# Patient Record
Sex: FEMALE | Race: BLACK OR AFRICAN AMERICAN | NOT HISPANIC OR LATINO | Employment: OTHER | URBAN - METROPOLITAN AREA
[De-identification: names, ages, dates, MRNs, and addresses within clinical notes are randomized per-mention and may not be internally consistent; named-entity substitution may affect disease eponyms.]

---

## 2023-01-07 ENCOUNTER — HOSPITAL ENCOUNTER (INPATIENT)
Facility: HOSPITAL | Age: 63
LOS: 4 days | Discharge: HOME | DRG: 177 | End: 2023-01-11
Attending: EMERGENCY MEDICINE | Admitting: INTERNAL MEDICINE
Payer: MEDICARE

## 2023-01-07 DIAGNOSIS — R06.00 DYSPNEA: ICD-10-CM

## 2023-01-07 DIAGNOSIS — U07.1 COVID-19: ICD-10-CM

## 2023-01-07 DIAGNOSIS — U07.1 PNEUMONIA DUE TO COVID-19 VIRUS: ICD-10-CM

## 2023-01-07 DIAGNOSIS — R07.9 CHEST PAIN: ICD-10-CM

## 2023-01-07 DIAGNOSIS — J12.82 PNEUMONIA DUE TO COVID-19 VIRUS: ICD-10-CM

## 2023-01-07 DIAGNOSIS — R09.02 HYPOXIA: Primary | ICD-10-CM

## 2023-01-07 LAB
ALBUMIN SERPL BCP-MCNC: 3.1 G/DL (ref 3.5–5.2)
ALP SERPL-CCNC: 43 U/L (ref 55–135)
ALT SERPL W/O P-5'-P-CCNC: 13 U/L (ref 10–44)
ANION GAP SERPL CALC-SCNC: 13 MMOL/L (ref 8–16)
AST SERPL-CCNC: 20 U/L (ref 10–40)
BASOPHILS # BLD AUTO: 0.01 K/UL (ref 0–0.2)
BASOPHILS NFR BLD: 0.2 % (ref 0–1.9)
BILIRUB SERPL-MCNC: 0.4 MG/DL (ref 0.1–1)
BNP SERPL-MCNC: 46 PG/ML (ref 0–99)
BUN SERPL-MCNC: 18 MG/DL (ref 8–23)
CALCIUM SERPL-MCNC: 8.1 MG/DL (ref 8.7–10.5)
CHLORIDE SERPL-SCNC: 93 MMOL/L (ref 95–110)
CO2 SERPL-SCNC: 32 MMOL/L (ref 23–29)
CREAT SERPL-MCNC: 6.4 MG/DL (ref 0.5–1.4)
DIFFERENTIAL METHOD: ABNORMAL
EOSINOPHIL # BLD AUTO: 0 K/UL (ref 0–0.5)
EOSINOPHIL NFR BLD: 0.4 % (ref 0–8)
ERYTHROCYTE [DISTWIDTH] IN BLOOD BY AUTOMATED COUNT: 15 % (ref 11.5–14.5)
EST. GFR  (NO RACE VARIABLE): 6.8 ML/MIN/1.73 M^2
GLUCOSE SERPL-MCNC: 83 MG/DL (ref 70–110)
HCT VFR BLD AUTO: 32.9 % (ref 37–48.5)
HGB BLD-MCNC: 10.5 G/DL (ref 12–16)
IMM GRANULOCYTES # BLD AUTO: 0.03 K/UL (ref 0–0.04)
IMM GRANULOCYTES NFR BLD AUTO: 0.7 % (ref 0–0.5)
LACTATE SERPL-SCNC: 1 MMOL/L (ref 0.5–1.9)
LYMPHOCYTES # BLD AUTO: 1 K/UL (ref 1–4.8)
LYMPHOCYTES NFR BLD: 22 % (ref 18–48)
MAGNESIUM SERPL-MCNC: 1.5 MG/DL (ref 1.6–2.6)
MCH RBC QN AUTO: 28.9 PG (ref 27–31)
MCHC RBC AUTO-ENTMCNC: 31.9 G/DL (ref 32–36)
MCV RBC AUTO: 91 FL (ref 82–98)
MONOCYTES # BLD AUTO: 0.3 K/UL (ref 0.3–1)
MONOCYTES NFR BLD: 7.6 % (ref 4–15)
NEUTROPHILS # BLD AUTO: 3.1 K/UL (ref 1.8–7.7)
NEUTROPHILS NFR BLD: 69.1 % (ref 38–73)
NRBC BLD-RTO: 0 /100 WBC
PLATELET # BLD AUTO: 172 K/UL (ref 150–450)
PMV BLD AUTO: 11.8 FL (ref 9.2–12.9)
POTASSIUM SERPL-SCNC: 3.3 MMOL/L (ref 3.5–5.1)
PROCALCITONIN SERPL IA-MCNC: 0.71 NG/ML (ref 0–0.5)
PROT SERPL-MCNC: 7.1 G/DL (ref 6–8.4)
RBC # BLD AUTO: 3.63 M/UL (ref 4–5.4)
SODIUM SERPL-SCNC: 138 MMOL/L (ref 136–145)
TROPONIN I SERPL HS-MCNC: 26.2 PG/ML (ref 0–14.9)
TROPONIN I SERPL HS-MCNC: 26.6 PG/ML (ref 0–14.9)
TROPONIN I SERPL HS-MCNC: 26.9 PG/ML (ref 0–14.9)
WBC # BLD AUTO: 4.49 K/UL (ref 3.9–12.7)

## 2023-01-07 PROCEDURE — 83880 ASSAY OF NATRIURETIC PEPTIDE: CPT | Performed by: NURSE PRACTITIONER

## 2023-01-07 PROCEDURE — 63600175 PHARM REV CODE 636 W HCPCS: Performed by: NURSE PRACTITIONER

## 2023-01-07 PROCEDURE — 87040 BLOOD CULTURE FOR BACTERIA: CPT | Performed by: NURSE PRACTITIONER

## 2023-01-07 PROCEDURE — 84145 PROCALCITONIN (PCT): CPT | Performed by: EMERGENCY MEDICINE

## 2023-01-07 PROCEDURE — 80053 COMPREHEN METABOLIC PANEL: CPT | Performed by: NURSE PRACTITIONER

## 2023-01-07 PROCEDURE — 36415 COLL VENOUS BLD VENIPUNCTURE: CPT | Performed by: NURSE PRACTITIONER

## 2023-01-07 PROCEDURE — 93005 ELECTROCARDIOGRAM TRACING: CPT | Performed by: INTERNAL MEDICINE

## 2023-01-07 PROCEDURE — 83735 ASSAY OF MAGNESIUM: CPT | Performed by: NURSE PRACTITIONER

## 2023-01-07 PROCEDURE — 84484 ASSAY OF TROPONIN QUANT: CPT | Mod: 91 | Performed by: NURSE PRACTITIONER

## 2023-01-07 PROCEDURE — 93010 EKG 12-LEAD: ICD-10-PCS | Mod: ,,, | Performed by: INTERNAL MEDICINE

## 2023-01-07 PROCEDURE — 12000002 HC ACUTE/MED SURGE SEMI-PRIVATE ROOM

## 2023-01-07 PROCEDURE — 25500020 PHARM REV CODE 255: Performed by: EMERGENCY MEDICINE

## 2023-01-07 PROCEDURE — 83605 ASSAY OF LACTIC ACID: CPT | Performed by: NURSE PRACTITIONER

## 2023-01-07 PROCEDURE — 96374 THER/PROPH/DIAG INJ IV PUSH: CPT

## 2023-01-07 PROCEDURE — 93010 ELECTROCARDIOGRAM REPORT: CPT | Mod: ,,, | Performed by: INTERNAL MEDICINE

## 2023-01-07 PROCEDURE — 25000003 PHARM REV CODE 250: Performed by: NURSE PRACTITIONER

## 2023-01-07 PROCEDURE — 99285 EMERGENCY DEPT VISIT HI MDM: CPT | Mod: 25

## 2023-01-07 PROCEDURE — 85025 COMPLETE CBC W/AUTO DIFF WBC: CPT | Performed by: NURSE PRACTITIONER

## 2023-01-07 RX ORDER — CETIRIZINE HYDROCHLORIDE 10 MG/1
10 TABLET ORAL DAILY
Status: ON HOLD | COMMUNITY
End: 2023-10-12

## 2023-01-07 RX ORDER — CARVEDILOL 12.5 MG/1
12.5 TABLET ORAL 2 TIMES DAILY WITH MEALS
COMMUNITY
End: 2024-03-05 | Stop reason: SDUPTHER

## 2023-01-07 RX ORDER — CALCITRIOL 0.25 UG/1
0.25 CAPSULE ORAL DAILY
COMMUNITY

## 2023-01-07 RX ORDER — ONDANSETRON 2 MG/ML
4 INJECTION INTRAMUSCULAR; INTRAVENOUS
Status: COMPLETED | OUTPATIENT
Start: 2023-01-07 | End: 2023-01-07

## 2023-01-07 RX ORDER — AMLODIPINE BESYLATE 5 MG/1
5 TABLET ORAL
Status: COMPLETED | OUTPATIENT
Start: 2023-01-07 | End: 2023-01-07

## 2023-01-07 RX ORDER — ROSUVASTATIN CALCIUM 10 MG/1
10 TABLET, COATED ORAL DAILY
COMMUNITY
End: 2023-07-18 | Stop reason: SDUPTHER

## 2023-01-07 RX ORDER — POTASSIUM CHLORIDE 20 MEQ/1
20 TABLET, EXTENDED RELEASE ORAL ONCE
Status: COMPLETED | OUTPATIENT
Start: 2023-01-07 | End: 2023-01-07

## 2023-01-07 RX ORDER — CARVEDILOL 12.5 MG/1
12.5 TABLET ORAL
Status: COMPLETED | OUTPATIENT
Start: 2023-01-07 | End: 2023-01-07

## 2023-01-07 RX ORDER — SAXAGLIPTIN 2.5 MG/1
2.5 TABLET, FILM COATED ORAL DAILY
COMMUNITY
End: 2023-06-22

## 2023-01-07 RX ORDER — AMLODIPINE BESYLATE 5 MG/1
5 TABLET ORAL DAILY
COMMUNITY
End: 2024-03-05 | Stop reason: SDUPTHER

## 2023-01-07 RX ORDER — OMEPRAZOLE 40 MG/1
40 CAPSULE, DELAYED RELEASE ORAL DAILY
COMMUNITY
End: 2023-08-10 | Stop reason: SDUPTHER

## 2023-01-07 RX ORDER — ASPIRIN 81 MG/1
81 TABLET ORAL DAILY
COMMUNITY

## 2023-01-07 RX ADMIN — AMLODIPINE BESYLATE 5 MG: 5 TABLET ORAL at 09:01

## 2023-01-07 RX ADMIN — POTASSIUM CHLORIDE 20 MEQ: 1500 TABLET, EXTENDED RELEASE ORAL at 09:01

## 2023-01-07 RX ADMIN — IOHEXOL 100 ML: 350 INJECTION, SOLUTION INTRAVENOUS at 07:01

## 2023-01-07 RX ADMIN — ONDANSETRON 4 MG: 2 INJECTION INTRAMUSCULAR; INTRAVENOUS at 05:01

## 2023-01-07 RX ADMIN — CARVEDILOL 12.5 MG: 12.5 TABLET, FILM COATED ORAL at 09:01

## 2023-01-08 ENCOUNTER — CLINICAL SUPPORT (OUTPATIENT)
Dept: CARDIOLOGY | Facility: HOSPITAL | Age: 63
DRG: 177 | End: 2023-01-08
Attending: INTERNAL MEDICINE
Payer: MEDICARE

## 2023-01-08 VITALS — WEIGHT: 293 LBS | BODY MASS INDEX: 47.09 KG/M2 | HEIGHT: 66 IN

## 2023-01-08 PROBLEM — U07.1 PNEUMONIA DUE TO COVID-19 VIRUS: Status: ACTIVE | Noted: 2023-01-08

## 2023-01-08 PROBLEM — N18.6 ESRD (END STAGE RENAL DISEASE): Status: ACTIVE | Noted: 2023-01-08

## 2023-01-08 PROBLEM — J12.82 PNEUMONIA DUE TO COVID-19 VIRUS: Status: ACTIVE | Noted: 2023-01-08

## 2023-01-08 PROBLEM — E11.9 TYPE 2 DIABETES MELLITUS WITHOUT COMPLICATION, WITHOUT LONG-TERM CURRENT USE OF INSULIN: Status: ACTIVE | Noted: 2023-01-08

## 2023-01-08 PROBLEM — I10 PRIMARY HYPERTENSION: Status: ACTIVE | Noted: 2023-01-08

## 2023-01-08 LAB
25(OH)D3+25(OH)D2 SERPL-MCNC: 40 NG/ML (ref 30–96)
ALBUMIN SERPL BCP-MCNC: 3.1 G/DL (ref 3.5–5.2)
ALBUMIN SERPL BCP-MCNC: 3.1 G/DL (ref 3.5–5.2)
ALP SERPL-CCNC: 41 U/L (ref 55–135)
ALT SERPL W/O P-5'-P-CCNC: 11 U/L (ref 10–44)
ANION GAP SERPL CALC-SCNC: 13 MMOL/L (ref 8–16)
ANION GAP SERPL CALC-SCNC: 13 MMOL/L (ref 8–16)
AORTIC ROOT ANNULUS: 2.7 CM
AORTIC VALVE CUSP SEPERATION: 1.87 CM
APTT BLDCRRT: 28.5 SEC (ref 21–32)
AST SERPL-CCNC: 18 U/L (ref 10–40)
AV INDEX (PROSTH): 0.75
AV MEAN GRADIENT: 5 MMHG
AV PEAK GRADIENT: 9 MMHG
AV VALVE AREA: 2.37 CM2
AV VELOCITY RATIO: 0.67
BILIRUB SERPL-MCNC: 0.6 MG/DL (ref 0.1–1)
BSA FOR ECHO PROCEDURE: 2.51 M2
BUN SERPL-MCNC: 21 MG/DL (ref 8–23)
BUN SERPL-MCNC: 21 MG/DL (ref 8–23)
CALCIUM SERPL-MCNC: 7.6 MG/DL (ref 8.7–10.5)
CALCIUM SERPL-MCNC: 7.6 MG/DL (ref 8.7–10.5)
CHLORIDE SERPL-SCNC: 95 MMOL/L (ref 95–110)
CHLORIDE SERPL-SCNC: 95 MMOL/L (ref 95–110)
CK SERPL-CCNC: 209 U/L (ref 20–180)
CO2 SERPL-SCNC: 30 MMOL/L (ref 23–29)
CO2 SERPL-SCNC: 30 MMOL/L (ref 23–29)
CREAT SERPL-MCNC: 7.3 MG/DL (ref 0.5–1.4)
CREAT SERPL-MCNC: 7.3 MG/DL (ref 0.5–1.4)
CRP SERPL-MCNC: 3.8 MG/DL
CV ECHO LV RWT: 0.72 CM
D DIMER PPP IA.FEU-MCNC: 4.05 MG/L FEU
DOP CALC AO PEAK VEL: 1.48 M/S
DOP CALC AO VTI: 29.3 CM
DOP CALC LVOT AREA: 3.2 CM2
DOP CALC LVOT DIAMETER: 2.01 CM
DOP CALC LVOT PEAK VEL: 0.99 M/S
DOP CALC LVOT STROKE VOLUME: 69.46 CM3
DOP CALCLVOT PEAK VEL VTI: 21.9 CM
E WAVE DECELERATION TIME: 207.19 MSEC
E/A RATIO: 0.94
E/E' RATIO: 9.71 M/S
ECHO LV POSTERIOR WALL: 1.53 CM (ref 0.6–1.1)
EJECTION FRACTION: 65 %
ERYTHROCYTE [SEDIMENTATION RATE] IN BLOOD BY WESTERGREN METHOD: 58 MM/HR (ref 0–20)
EST. GFR  (NO RACE VARIABLE): 5.8 ML/MIN/1.73 M^2
EST. GFR  (NO RACE VARIABLE): 5.8 ML/MIN/1.73 M^2
FERRITIN SERPL-MCNC: 1300 NG/ML (ref 20–300)
FRACTIONAL SHORTENING: 31 % (ref 28–44)
GLUCOSE SERPL-MCNC: 122 MG/DL (ref 70–110)
GLUCOSE SERPL-MCNC: 128 MG/DL (ref 70–110)
GLUCOSE SERPL-MCNC: 128 MG/DL (ref 70–110)
GLUCOSE SERPL-MCNC: 142 MG/DL (ref 70–110)
GLUCOSE SERPL-MCNC: 189 MG/DL (ref 70–110)
INR PPP: 1 (ref 0.8–1.2)
INTERVENTRICULAR SEPTUM: 1.54 CM (ref 0.6–1.1)
IVRT: 70.45 MSEC
LDH SERPL L TO P-CCNC: 173 U/L (ref 110–260)
LEFT ATRIUM SIZE: 3.78 CM
LEFT ATRIUM VOLUME INDEX MOD: 22.8 ML/M2
LEFT ATRIUM VOLUME MOD: 54 CM3
LEFT INTERNAL DIMENSION IN SYSTOLE: 2.96 CM (ref 2.1–4)
LEFT VENTRICLE DIASTOLIC VOLUME INDEX: 34.32 ML/M2
LEFT VENTRICLE DIASTOLIC VOLUME: 81.35 ML
LEFT VENTRICLE MASS INDEX: 111 G/M2
LEFT VENTRICLE SYSTOLIC VOLUME INDEX: 14.3 ML/M2
LEFT VENTRICLE SYSTOLIC VOLUME: 33.96 ML
LEFT VENTRICULAR INTERNAL DIMENSION IN DIASTOLE: 4.26 CM (ref 3.5–6)
LEFT VENTRICULAR MASS: 263.95 G
LV LATERAL E/E' RATIO: 11.33 M/S
LV SEPTAL E/E' RATIO: 8.5 M/S
LVOT MG: 1.87 MMHG
LVOT MV: 0.62 CM/S
MAGNESIUM SERPL-MCNC: 1.5 MG/DL (ref 1.6–2.6)
MV PEAK A VEL: 1.08 M/S
MV PEAK E VEL: 1.02 M/S
MV PEAK GRADIENT: 5 MMHG
MV STENOSIS PRESSURE HALF TIME: 60.09 MS
MV VALVE AREA P 1/2 METHOD: 3.66 CM2
PHOSPHATE SERPL-MCNC: 3.6 MG/DL (ref 2.7–4.5)
PHOSPHATE SERPL-MCNC: 3.6 MG/DL (ref 2.7–4.5)
POTASSIUM SERPL-SCNC: 3.5 MMOL/L (ref 3.5–5.1)
POTASSIUM SERPL-SCNC: 3.5 MMOL/L (ref 3.5–5.1)
PROT SERPL-MCNC: 6.5 G/DL (ref 6–8.4)
PROTHROMBIN TIME: 10.8 SEC (ref 9–12.5)
PV MV: 0.77 M/S
PV PEAK VELOCITY: 1.3 CM/S
RA PRESSURE: 3 MMHG
RV TISSUE DOPPLER FREE WALL SYSTOLIC VELOCITY 1 (APICAL 4 CHAMBER VIEW): 0.01 CM/S
SODIUM SERPL-SCNC: 138 MMOL/L (ref 136–145)
SODIUM SERPL-SCNC: 138 MMOL/L (ref 136–145)
TDI LATERAL: 0.09 M/S
TDI SEPTAL: 0.12 M/S
TDI: 0.11 M/S
TRICUSPID ANNULAR PLANE SYSTOLIC EXCURSION: 1.92 CM
TROPONIN I SERPL HS-MCNC: 29.2 PG/ML (ref 0–14.9)

## 2023-01-08 PROCEDURE — 83735 ASSAY OF MAGNESIUM: CPT | Performed by: INTERNAL MEDICINE

## 2023-01-08 PROCEDURE — 83615 LACTATE (LD) (LDH) ENZYME: CPT | Performed by: INTERNAL MEDICINE

## 2023-01-08 PROCEDURE — 82728 ASSAY OF FERRITIN: CPT | Performed by: INTERNAL MEDICINE

## 2023-01-08 PROCEDURE — 93306 TTE W/DOPPLER COMPLETE: CPT

## 2023-01-08 PROCEDURE — 80053 COMPREHEN METABOLIC PANEL: CPT | Performed by: INTERNAL MEDICINE

## 2023-01-08 PROCEDURE — 85730 THROMBOPLASTIN TIME PARTIAL: CPT | Performed by: INTERNAL MEDICINE

## 2023-01-08 PROCEDURE — 25000003 PHARM REV CODE 250: Performed by: INTERNAL MEDICINE

## 2023-01-08 PROCEDURE — 94640 AIRWAY INHALATION TREATMENT: CPT

## 2023-01-08 PROCEDURE — 99900035 HC TECH TIME PER 15 MIN (STAT)

## 2023-01-08 PROCEDURE — 85379 FIBRIN DEGRADATION QUANT: CPT | Performed by: INTERNAL MEDICINE

## 2023-01-08 PROCEDURE — 82306 VITAMIN D 25 HYDROXY: CPT | Performed by: INTERNAL MEDICINE

## 2023-01-08 PROCEDURE — 94799 UNLISTED PULMONARY SVC/PX: CPT

## 2023-01-08 PROCEDURE — 86140 C-REACTIVE PROTEIN: CPT | Performed by: INTERNAL MEDICINE

## 2023-01-08 PROCEDURE — 93306 TTE W/DOPPLER COMPLETE: CPT | Mod: 26,,, | Performed by: GENERAL PRACTICE

## 2023-01-08 PROCEDURE — 36415 COLL VENOUS BLD VENIPUNCTURE: CPT | Performed by: INTERNAL MEDICINE

## 2023-01-08 PROCEDURE — 12000002 HC ACUTE/MED SURGE SEMI-PRIVATE ROOM

## 2023-01-08 PROCEDURE — 85610 PROTHROMBIN TIME: CPT | Performed by: INTERNAL MEDICINE

## 2023-01-08 PROCEDURE — 84484 ASSAY OF TROPONIN QUANT: CPT | Performed by: INTERNAL MEDICINE

## 2023-01-08 PROCEDURE — 93306 ECHO (CUPID ONLY): ICD-10-PCS | Mod: 26,,, | Performed by: GENERAL PRACTICE

## 2023-01-08 PROCEDURE — 85651 RBC SED RATE NONAUTOMATED: CPT | Performed by: INTERNAL MEDICINE

## 2023-01-08 PROCEDURE — 99900031 HC PATIENT EDUCATION (STAT)

## 2023-01-08 PROCEDURE — 94761 N-INVAS EAR/PLS OXIMETRY MLT: CPT

## 2023-01-08 PROCEDURE — 27000221 HC OXYGEN, UP TO 24 HOURS

## 2023-01-08 PROCEDURE — 84100 ASSAY OF PHOSPHORUS: CPT | Performed by: INTERNAL MEDICINE

## 2023-01-08 PROCEDURE — 63600175 PHARM REV CODE 636 W HCPCS: Mod: TB | Performed by: INTERNAL MEDICINE

## 2023-01-08 PROCEDURE — 25000242 PHARM REV CODE 250 ALT 637 W/ HCPCS: Performed by: INTERNAL MEDICINE

## 2023-01-08 PROCEDURE — 82550 ASSAY OF CK (CPK): CPT | Performed by: INTERNAL MEDICINE

## 2023-01-08 RX ORDER — TALC
6 POWDER (GRAM) TOPICAL NIGHTLY PRN
Status: DISCONTINUED | OUTPATIENT
Start: 2023-01-08 | End: 2023-01-11 | Stop reason: HOSPADM

## 2023-01-08 RX ORDER — SODIUM CHLORIDE 9 MG/ML
INJECTION, SOLUTION INTRAVENOUS
Status: CANCELLED | OUTPATIENT
Start: 2023-01-08

## 2023-01-08 RX ORDER — IBUPROFEN 200 MG
24 TABLET ORAL
Status: DISCONTINUED | OUTPATIENT
Start: 2023-01-08 | End: 2023-01-11 | Stop reason: HOSPADM

## 2023-01-08 RX ORDER — ENOXAPARIN SODIUM 100 MG/ML
100 INJECTION SUBCUTANEOUS EVERY 24 HOURS
Status: DISCONTINUED | OUTPATIENT
Start: 2023-01-09 | End: 2023-01-11 | Stop reason: HOSPADM

## 2023-01-08 RX ORDER — HYDROCODONE BITARTRATE AND ACETAMINOPHEN 5; 325 MG/1; MG/1
1 TABLET ORAL EVERY 4 HOURS PRN
Status: DISCONTINUED | OUTPATIENT
Start: 2023-01-08 | End: 2023-01-11 | Stop reason: HOSPADM

## 2023-01-08 RX ORDER — ENOXAPARIN SODIUM 100 MG/ML
40 INJECTION SUBCUTANEOUS
Status: DISCONTINUED | OUTPATIENT
Start: 2023-01-09 | End: 2023-01-11 | Stop reason: HOSPADM

## 2023-01-08 RX ORDER — ACETAMINOPHEN 325 MG/1
650 TABLET ORAL EVERY 8 HOURS PRN
Status: DISCONTINUED | OUTPATIENT
Start: 2023-01-08 | End: 2023-01-11 | Stop reason: HOSPADM

## 2023-01-08 RX ORDER — LANOLIN ALCOHOL/MO/W.PET/CERES
400 CREAM (GRAM) TOPICAL 2 TIMES DAILY
Status: DISPENSED | OUTPATIENT
Start: 2023-01-08 | End: 2023-01-10

## 2023-01-08 RX ORDER — MUPIROCIN 20 MG/G
OINTMENT TOPICAL 2 TIMES DAILY
Status: DISCONTINUED | OUTPATIENT
Start: 2023-01-08 | End: 2023-01-11 | Stop reason: HOSPADM

## 2023-01-08 RX ORDER — ASCORBIC ACID 500 MG
500 TABLET ORAL 2 TIMES DAILY
Status: DISCONTINUED | OUTPATIENT
Start: 2023-01-08 | End: 2023-01-11 | Stop reason: HOSPADM

## 2023-01-08 RX ORDER — ALBUTEROL SULFATE 90 UG/1
2 AEROSOL, METERED RESPIRATORY (INHALATION) EVERY 8 HOURS
Status: DISCONTINUED | OUTPATIENT
Start: 2023-01-08 | End: 2023-01-08

## 2023-01-08 RX ORDER — ENOXAPARIN SODIUM 100 MG/ML
1 INJECTION SUBCUTANEOUS
Status: DISCONTINUED | OUTPATIENT
Start: 2023-01-08 | End: 2023-01-08

## 2023-01-08 RX ORDER — ASPIRIN 81 MG/1
81 TABLET ORAL DAILY
Status: DISCONTINUED | OUTPATIENT
Start: 2023-01-08 | End: 2023-01-11 | Stop reason: HOSPADM

## 2023-01-08 RX ORDER — ONDANSETRON 2 MG/ML
4 INJECTION INTRAMUSCULAR; INTRAVENOUS EVERY 8 HOURS PRN
Status: DISCONTINUED | OUTPATIENT
Start: 2023-01-08 | End: 2023-01-11 | Stop reason: HOSPADM

## 2023-01-08 RX ORDER — HEPARIN SODIUM 5000 [USP'U]/ML
5000 INJECTION, SOLUTION INTRAVENOUS; SUBCUTANEOUS
Status: CANCELLED | OUTPATIENT
Start: 2023-01-08

## 2023-01-08 RX ORDER — GUAIFENESIN/DEXTROMETHORPHAN 100-10MG/5
10 SYRUP ORAL EVERY 4 HOURS PRN
Status: DISCONTINUED | OUTPATIENT
Start: 2023-01-08 | End: 2023-01-11 | Stop reason: HOSPADM

## 2023-01-08 RX ORDER — CALCITRIOL 0.25 UG/1
0.25 CAPSULE ORAL DAILY
Status: DISCONTINUED | OUTPATIENT
Start: 2023-01-08 | End: 2023-01-11 | Stop reason: HOSPADM

## 2023-01-08 RX ORDER — GLUCAGON 1 MG
1 KIT INJECTION
Status: DISCONTINUED | OUTPATIENT
Start: 2023-01-08 | End: 2023-01-11 | Stop reason: HOSPADM

## 2023-01-08 RX ORDER — SODIUM CHLORIDE 0.9 % (FLUSH) 0.9 %
10 SYRINGE (ML) INJECTION
Status: DISCONTINUED | OUTPATIENT
Start: 2023-01-08 | End: 2023-01-11 | Stop reason: HOSPADM

## 2023-01-08 RX ORDER — DEXAMETHASONE 2 MG/1
6 TABLET ORAL DAILY
Status: DISCONTINUED | OUTPATIENT
Start: 2023-01-08 | End: 2023-01-11 | Stop reason: HOSPADM

## 2023-01-08 RX ORDER — SODIUM CHLORIDE 9 MG/ML
INJECTION, SOLUTION INTRAVENOUS ONCE
Status: CANCELLED | OUTPATIENT
Start: 2023-01-08 | End: 2023-01-08

## 2023-01-08 RX ORDER — ALBUTEROL SULFATE 0.83 MG/ML
2.5 SOLUTION RESPIRATORY (INHALATION) EVERY 8 HOURS
Status: DISCONTINUED | OUTPATIENT
Start: 2023-01-08 | End: 2023-01-11 | Stop reason: HOSPADM

## 2023-01-08 RX ORDER — CETIRIZINE HYDROCHLORIDE 10 MG/1
10 TABLET ORAL DAILY
Status: DISCONTINUED | OUTPATIENT
Start: 2023-01-08 | End: 2023-01-11 | Stop reason: HOSPADM

## 2023-01-08 RX ORDER — CARVEDILOL 12.5 MG/1
12.5 TABLET ORAL 2 TIMES DAILY WITH MEALS
Status: DISCONTINUED | OUTPATIENT
Start: 2023-01-08 | End: 2023-01-11 | Stop reason: HOSPADM

## 2023-01-08 RX ORDER — PANTOPRAZOLE SODIUM 40 MG/1
40 TABLET, DELAYED RELEASE ORAL
Status: DISCONTINUED | OUTPATIENT
Start: 2023-01-08 | End: 2023-01-11 | Stop reason: HOSPADM

## 2023-01-08 RX ORDER — ALBUTEROL SULFATE 90 UG/1
2 AEROSOL, METERED RESPIRATORY (INHALATION) EVERY 6 HOURS
Status: DISCONTINUED | OUTPATIENT
Start: 2023-01-08 | End: 2023-01-08

## 2023-01-08 RX ORDER — AMLODIPINE BESYLATE 5 MG/1
5 TABLET ORAL 2 TIMES DAILY
Status: DISCONTINUED | OUTPATIENT
Start: 2023-01-08 | End: 2023-01-11 | Stop reason: HOSPADM

## 2023-01-08 RX ORDER — CINACALCET 30 MG/1
30 TABLET, FILM COATED ORAL DAILY
Status: DISCONTINUED | OUTPATIENT
Start: 2023-01-08 | End: 2023-01-11 | Stop reason: HOSPADM

## 2023-01-08 RX ORDER — IBUPROFEN 200 MG
16 TABLET ORAL
Status: DISCONTINUED | OUTPATIENT
Start: 2023-01-08 | End: 2023-01-11 | Stop reason: HOSPADM

## 2023-01-08 RX ORDER — ATORVASTATIN CALCIUM 40 MG/1
40 TABLET, FILM COATED ORAL DAILY
Status: DISCONTINUED | OUTPATIENT
Start: 2023-01-08 | End: 2023-01-11 | Stop reason: HOSPADM

## 2023-01-08 RX ADMIN — OXYCODONE HYDROCHLORIDE AND ACETAMINOPHEN 500 MG: 500 TABLET ORAL at 09:01

## 2023-01-08 RX ADMIN — THERA TABS 1 TABLET: TAB at 08:01

## 2023-01-08 RX ADMIN — GUAIFENESIN AND DEXTROMETHORPHAN 10 ML: 100; 10 SYRUP ORAL at 01:01

## 2023-01-08 RX ADMIN — ALBUTEROL SULFATE 2.5 MG: 2.5 SOLUTION RESPIRATORY (INHALATION) at 08:01

## 2023-01-08 RX ADMIN — ASPIRIN 81 MG: 81 TABLET, COATED ORAL at 08:01

## 2023-01-08 RX ADMIN — ENOXAPARIN SODIUM 140 MG: 30 INJECTION SUBCUTANEOUS at 08:01

## 2023-01-08 RX ADMIN — ALBUTEROL SULFATE 2.5 MG: 2.5 SOLUTION RESPIRATORY (INHALATION) at 02:01

## 2023-01-08 RX ADMIN — CINACALCET 30 MG: 30 TABLET, FILM COATED ORAL at 08:01

## 2023-01-08 RX ADMIN — MUPIROCIN 1 G: 20 OINTMENT TOPICAL at 09:01

## 2023-01-08 RX ADMIN — CARVEDILOL 12.5 MG: 12.5 TABLET, FILM COATED ORAL at 04:01

## 2023-01-08 RX ADMIN — AMLODIPINE BESYLATE 5 MG: 5 TABLET ORAL at 08:01

## 2023-01-08 RX ADMIN — PANTOPRAZOLE SODIUM 40 MG: 40 TABLET, DELAYED RELEASE ORAL at 05:01

## 2023-01-08 RX ADMIN — CALCITRIOL CAPSULES 0.25 MCG 0.25 MCG: 0.25 CAPSULE ORAL at 08:01

## 2023-01-08 RX ADMIN — Medication 400 MG: at 09:01

## 2023-01-08 RX ADMIN — CEFTRIAXONE 1 G: 1 INJECTION, SOLUTION INTRAVENOUS at 01:01

## 2023-01-08 RX ADMIN — DEXAMETHASONE 6 MG: 2 TABLET ORAL at 08:01

## 2023-01-08 RX ADMIN — ALBUTEROL SULFATE 2.5 MG: 2.5 SOLUTION RESPIRATORY (INHALATION) at 11:01

## 2023-01-08 RX ADMIN — REMDESIVIR 200 MG: 100 INJECTION, POWDER, LYOPHILIZED, FOR SOLUTION INTRAVENOUS at 08:01

## 2023-01-08 RX ADMIN — OXYCODONE HYDROCHLORIDE AND ACETAMINOPHEN 500 MG: 500 TABLET ORAL at 08:01

## 2023-01-08 RX ADMIN — CARVEDILOL 12.5 MG: 12.5 TABLET, FILM COATED ORAL at 08:01

## 2023-01-08 RX ADMIN — CETIRIZINE HYDROCHLORIDE 10 MG: 10 TABLET, FILM COATED ORAL at 08:01

## 2023-01-08 RX ADMIN — ATORVASTATIN CALCIUM 40 MG: 40 TABLET, FILM COATED ORAL at 08:01

## 2023-01-08 NOTE — ASSESSMENT & PLAN NOTE
Patient is identified as Severe COVID-19 based on hypoxemia with O2 saturations <94% on room air or on ambulation   Initiate standard COVID protocols; COVID-19 testing Collection Date: 3/5/2022 Collection Time:  10:38 AM ,Infection Control notification  and isolation- respiratory, contact and droplet per protocol    Diagnostics: (leukopenia, hyponatremia, hyperferritinemia, elevated troponin, elevated d-dimer, age, and comorbidities are significant predictors of poor clinical outcome)  CBC, CMP, Ferritin, CRP and Portable CXR    Management: Inhaled bronchodilators as needed for shortness of breath.     Inpatient admission for COVID Pneumonia, Hypoxic Respiratory Failure; patient is 5 days out from Dx: will initiate COVID protocol; but also include ceftriaxone for possible bacterial pneumonia; continue home regimen for chronic maladies except holding po hypoglycemics with low dose insulin sliding scale; Nephrology consultation for RRT in AM (s/p CTA contrast); COVID isolation; AM labs for review; ECHO; supplemental oxygen per protocol

## 2023-01-08 NOTE — ED PROVIDER NOTES
Encounter Date: 1/7/2023       History     Chief Complaint   Patient presents with    POSITIVE COVID     TESTED POSITIVE MONDAY    Shortness of Breath    Fatigue     Marian Dumont is a 62 year old female with pmh DM, CKD on dialysis, HTN, hyperlipidemia presenting to the ED with c/o shortness of breath. She was diagnosed with COVID five days ago but began having symptoms 8 days ago. She initially began with vomiting and diarrhea but began having shortness of breath and chest heaviness a few days ago. The shortness of breath and chest heaviness has increased and is exacerbated with lying flat. She went to dialysis today and was able to complete full dialysis.     Review of patient's allergies indicates:   Allergen Reactions    Ace inhibitors      Past Medical History:   Diagnosis Date    COVID-19     Diabetes mellitus     Dialysis patient     Hypertension     Mixed hyperlipidemia     Obese body habitus      History reviewed. No pertinent surgical history.  History reviewed. No pertinent family history.     Review of Systems   Constitutional:  Positive for fatigue and fever.   HENT:  Negative for sore throat.    Respiratory:  Positive for cough and shortness of breath.    Cardiovascular:  Positive for chest pain.   Gastrointestinal:  Positive for nausea and vomiting. Negative for diarrhea.   Genitourinary:  Negative for dysuria.   Musculoskeletal:  Negative for back pain.   Skin:  Negative for rash.   Neurological:  Negative for weakness.   Hematological:  Does not bruise/bleed easily.     Physical Exam     Initial Vitals [01/07/23 1619]   BP Pulse Resp Temp SpO2   139/63 85 20 100 °F (37.8 °C) 97 %      MAP       --         Physical Exam    Nursing note and vitals reviewed.  Constitutional: She appears well-developed and well-nourished. She is not diaphoretic. No distress.   HENT:   Head: Normocephalic and atraumatic.   Mouth/Throat: Oropharynx is clear and moist.   Eyes: Conjunctivae are normal.   Neck: Neck  supple.   Cardiovascular:  Normal rate, regular rhythm, normal heart sounds and intact distal pulses.     Exam reveals no gallop and no friction rub.       No murmur heard.  Pulmonary/Chest: No respiratory distress. She has no wheezes. She has rhonchi. She has no rales.   Abdominal: Abdomen is soft. She exhibits no distension. There is no abdominal tenderness.   Musculoskeletal:         General: Normal range of motion.      Cervical back: Neck supple.     Neurological: She is alert and oriented to person, place, and time.   Skin: No rash noted. No erythema.   Dialysis access to left upper extremity   Psychiatric: Her speech is normal.       ED Course   Procedures  Labs Reviewed   CBC W/ AUTO DIFFERENTIAL - Abnormal; Notable for the following components:       Result Value    RBC 3.63 (*)     Hemoglobin 10.5 (*)     Hematocrit 32.9 (*)     MCHC 31.9 (*)     RDW 15.0 (*)     Immature Granulocytes 0.7 (*)     All other components within normal limits   COMPREHENSIVE METABOLIC PANEL - Abnormal; Notable for the following components:    Potassium 3.3 (*)     Chloride 93 (*)     CO2 32 (*)     Creatinine 6.4 (*)     Calcium 8.1 (*)     Albumin 3.1 (*)     Alkaline Phosphatase 43 (*)     eGFR 6.8 (*)     All other components within normal limits   TROPONIN I HIGH SENSITIVITY - Abnormal; Notable for the following components:    Troponin I High Sensitivity 26.6 (*)     All other components within normal limits   TROPONIN I HIGH SENSITIVITY - Abnormal; Notable for the following components:    Troponin I High Sensitivity 26.9 (*)     All other components within normal limits   TROPONIN I HIGH SENSITIVITY - Abnormal; Notable for the following components:    Troponin I High Sensitivity 26.2 (*)     All other components within normal limits   MAGNESIUM - Abnormal; Notable for the following components:    Magnesium 1.5 (*)     All other components within normal limits   CULTURE, BLOOD   CULTURE, BLOOD   B-TYPE NATRIURETIC PEPTIDE    PROCALCITONIN   MAGNESIUM   LACTIC ACID, PLASMA   PROCALCITONIN          Imaging Results              CTA Chest Non-Coronary (PE Studies) (Final result)  Result time 01/07/23 20:05:32      Final result by Lico Yuan Jr., MD (01/07/23 20:05:32)                   Narrative:    CMS MANDATED QUALITY DATA-CT RADIATION-436    HISTORY: Pulmonary embolism suspected. Positive d-dimer..    TECHNIQUE: Thin axial imaging through the chest was performed with 100 cc of Omnipaque 350 IV contrast, with sagittal and coronal images, MIPS, and or 3D reconstructions performed. All CT exams at this facility use dose modulation, iterative reconstruction, and or weight based dosing when appropriate to reduce radiation dose to as low as reasonably achievable.    FINDINGS:    Examination is of diagnostic quality as there is normal opacification of the pulmonary arterial tree out to the tertiary order branch vessels without evidence of pruning, truncation or webbing of the pulmonary arterial system. The main pulmonary trunk appears patent without evidence of saddle embolus manifest the branch from the main pulmonary trunk. No indication of right ventricular strain. Mild concentric thickening about the left ventricular myocardium due to systolic dysfunction. No evidence of backflow contrast into the suprahepatic inferior vena cava. Remainder of the cardiac chambers are of normal size.    The aorta is normal in caliber markedly ectatic becoming midline at diaphragmatic hiatus. No evidence of intimal dissection or aneurysmal expansion.    Parenchymal lung infiltrates settings reveal patchy widespread multilobar areas of ground glass opacity changes likely infectious or inflammatory in nature due to multilobar pneumonia. No evidence of consolidation.    Trace effusions are noted bilaterally.    Abnormal liver with evidence of a fairly diffuse replacement process of the left hepatic lobe, curvilinear masslike focus with calcified outline  occupying the left hepatic lobe measured parameters of 4.6 x 6.5 cm suspect for primary hepatic lesion or metastatic disease to the liver. No adrenal enlargement. The spleen appears normal in size. There is nodular appearance of the left adrenal gland.    IMPRESSION:      1. No evidence of acute pulmonary thromboembolism.  2. Multi focal nodular opacities that are established throughout both lungs likely infectious or postinflammatory be attributed to multilobar pneumonia.  3. Systolic dysfunction with prominent thickening of the ventricular myocardium.  4. Large zone of low density replacement of the left hepatic lobe some areas. Masslike in appearance the focal area measuring 6.4 x 4.7 cm perhaps metastatic or secondary to primary hepatic lesion. Recommend dedicated evaluation of the abdomen with tailored liver mass protocol evaluation..    Electronically signed by:  Lico Yuan MD  1/7/2023 8:05 PM CST Workstation: [id number]                                     X-Ray Chest AP Portable (Final result)  Result time 01/07/23 17:22:22      Final result by Lico Yuan Jr., MD (01/07/23 17:22:22)                   Narrative:    CHEST X-RAY SINGLE VIEW    HISTORY: Shortness of breath. Fatigue.    FINDINGS:   A single view of the chest was performed without the benefit of previous comparison.  The heart size and pulmonary vascularity are within the range of normal.  There is no significant hilar nor mediastinal process.  The aerated lungs are well expanded and clear.  The right and left CP angles are rather sharp.  The osseous structures show nothing unusual.    IMPRESSION:   Negative chest.    Electronically signed by:  Lico Yuan MD  1/7/2023 5:22 PM CST Workstation: [id number]                                  X-Rays:   Independently Interpreted Readings:   Chest X-Ray: Normal heart size.  No infiltrates.  No acute abnormalities.   Medications   ondansetron injection 4 mg (4 mg Intravenous Given 1/7/23  1752)   iohexoL (OMNIPAQUE 350) injection 100 mL (100 mLs Intravenous Given 1/7/23 1949)   amLODIPine tablet 5 mg (5 mg Oral Given 1/7/23 2155)   carvediloL tablet 12.5 mg (12.5 mg Oral Given 1/7/23 2155)   potassium chloride SA CR tablet 20 mEq (20 mEq Oral Given 1/7/23 2155)     Medical Decision Making:   Independently Interpreted Test(s):   I have ordered and independently interpreted X-rays - see prior notes.  I have ordered and independently interpreted EKG Reading(s) - see prior notes  Clinical Tests:   Lab Tests: Ordered and Reviewed       <> Summary of Lab: Troponin #1 26.6  Troponin #2. 26.2  BNP 46   Magnesium 1.5  Potassium 3.3 chloride 93 bicarb 32 creatinine 6.4 calcium 8.1 GFR 6.8  White count 4.49 H&H 10.5 and 32.9    Radiological Study: Ordered and Reviewed  Medical Tests: Ordered and Reviewed  ED Management:  Attending Note:  I provided a face to face evaluation of this patient.  I discussed the patient's care with the Resident.  I reviewed their note and agree with the history, physical, assessment, diagnosis, treatment, all procedures performed, xray and EKG interpretations and  plan provided by the Resident. My overall impression is COVID with bilateral pneumonia with hypoxia.  When patient ambulated sats decreased to 89%.  Patient be admitted to Hospital Medicine for further treatment of COVID pneumonia.  She also will be seen by Nephrology  since she is a hemodialysis patient will need dialyzed on Monday.  She also received a dye load with her CTA of the chest and will be assessed by then for dialysis.  Patient's troponins have been unchanged in her stable at 26.2.  We do not suspect ACS.  BNP was normal.  No significant electrolyte abnormalities.  Anemia is at baseline.  We will speak to Hospital Medicine for admission.  Macey Gardner M.D. 1/7/2023 10:13 PM         APC / Resident Notes:   This is an urgent evaluation of a 62 year old dialysis patient with c/o SOB and chest  heaviness. She is COVID positive per patient 5 days ago with 8 days of symptoms. I spoke with Dr. Gómez prior to CTA and he approved use of IV contrast for PE study. CTA reviewed and shows no evidence of PE but there is concern for multilobar pneumonia. She has a normal lactic acid and no leukocytosis. She was found to be hypoxic with ambulation with an oxygen sat of 89%. Troponins unchanged with no clinically significant change between the two results (26.2). No suspicion for ACS or CHF with a normal BNP and no pulmonary edema. She will be admitted to Hospital medicine for admission due to hypoxia with COVID pneumonia. Dr. Gil evaluated the patient in the ED prior to transfer to the floor.                    Clinical Impression:   Final diagnoses:  [R07.9] Chest pain  [U07.1] COVID-19  [R09.02] Hypoxia (Primary)  [U07.1, J12.82] Pneumonia due to COVID-19 virus        ED Disposition Condition    Admit Stable                Lila Paz NP  01/07/23 5579

## 2023-01-08 NOTE — PLAN OF CARE
01/08/23 1213   ELIZONDO Message   Medicare Outpatient and Observation Notification regarding financial responsibility Explained to patient/caregiver  (Explained ELIZONDO to patient via telephone.  Pt verbalized understanding.)   Date ELIZONDO was signed 01/08/23   Time ELIZONDO was signed 1145

## 2023-01-08 NOTE — PROGRESS NOTES
Automatic Inhaler to Nebulizer Interchange    albuterol (Ventolin, ProAir, or Proventil)  mcg given multiple times per day changed to albuterol 2.5 mg Q8H  per [facility] Automatic Therapeutic Substitutions Protocol.    Please contact pharmacy at extension [phone] with any questions.     Thank you,   Mehran Lim

## 2023-01-08 NOTE — PLAN OF CARE
[facility]  Initial Discharge Assessment       Primary Care Provider: Jonathan Tao MD    Admission Diagnosis: Pneumonia due to COVID-19 virus [U07.1, J12.82]    Admission Date: 1/7/2023  Expected Discharge Date:     Discharge Barriers Identified: (P) None    Payor: MEDICARE / Plan: MEDICARE PART A & B / Product Type: Government /     Extended Emergency Contact Information  Primary Emergency Contact: More Lopes  Mobile Phone: [phone]  Relation: Daughter  Preferred language: English   needed? No  Secondary Emergency Contact: SudhirJack  Mobile Phone: [phone]  Relation: Daughter  Preferred language: English   needed? No    Discharge Plan A: (P) Home with family  Discharge Plan B: (P) Home with family      "[id number] - [place] - [address] [facility] [address]  [address]  [place] [address]  Phone: [phone] Fax: [phone]      Initial Assessment (most recent)       Adult Discharge Assessment - 01/08/23 1203          Discharge Assessment    Assessment Type Discharge Planning Assessment     Confirmed/corrected address, phone number and insurance Yes (P)      Confirmed Demographics Correct on Facesheet (P)      Source of Information patient (P)      When was your last doctors appointment? 11/22/22 (P)      Communicated JULIA with patient/caregiver Date not available/Unable to determine (P)      Reason For Admission COVID Pneumonia (P)      People in Home child(francisco), adult (P)      Do you expect to return to your current living situation? Yes (P)      Do you have help at home or someone to help you manage your care at home? Yes (P)      Who are your caregiver(s) and their phone number(s)? Ama (daughter) (P)      Prior to hospitilization cognitive status: Alert/Oriented (P)      Current cognitive status: Alert/Oriented (P)      Home Layout Able to live on 1st floor (P)      Equipment Currently Used at  Home none (P)      Readmission within 30 days? No (P)      Patient currently being followed by outpatient case management? No (P)      Do you currently have service(s) that help you manage your care at home? No (P)      Do you take prescription medications? Yes (P)      Do you have prescription coverage? Yes (P)      Coverage Medicare (P)      Do you have any problems affording any of your prescribed medications? No (P)      Is the patient taking medications as prescribed? yes (P)      Who is going to help you get home at discharge? Ama (daughter lives with patient) (P)      How do you get to doctors appointments? car, drives self (P)      Are you on dialysis? No (P)      Do you take coumadin? No (P)      Discharge Plan A Home with family (P)      Discharge Plan B Home with family (P)      DME Needed Upon Discharge  none (P)      Discharge Plan discussed with: Patient (P)      Discharge Barriers Identified None (P)

## 2023-01-08 NOTE — H&P
"[facility] - Emergency Dept  Hospital Medicine  History & Physical    Patient Name: Marian Dumont  MRN: [id number]  Patient Class: IP- Inpatient  Admission Date: 1/7/2023  Attending Physician: Zachary Gil MD  Primary Care Provider: Jonathan Tao MD         Patient information was obtained from patient, relative(s), past medical records, ER records and ED MD.     Subjective:     Principal Problem:Pneumonia due to COVID-19 virus    Chief Complaint:   Chief Complaint   Patient presents with    POSITIVE COVID     TESTED POSITIVE MONDAY    Shortness of Breath    Fatigue        HPI: 62 year of female with history of Polycystic Kidney with ESRD (HD M,W,F), HTN, DM 2, HLD, Pericardial window (Pericarditis), COVID positive 5 days ago presented to ED complaining of worsening SOBE, and dry cough. She cannnot walk to bathroom without stopping to catch her breath. In ED her oxygen saturation was low 90's at rest and fell to mid to low 80's with ambulation. She feels "Miserable". Denied any pain. No orthopnea, or PND. Had RRT today (usually M,W,F--but was changed to T,T,S when tested positive for COVID because her RRT center was changed to accept her with COVID infection). She went to [facility] because of COVID Dx and SOB (increased risk of PE). No PE, but mass like lesion was seen in her liver. Discussed with patient and she stated that she had been told that before, and it was "From my cystic kidney disease". Masses are not described as such. Outpatient follow-up of liver lesion was recommended to patient and should be re-emphasized at discharge. The patient has moved from [place] to here, some records are available through [facility], but no detailed information is currently available.    In ED Labs reviewed: no leukocytosis with expected minimal normocytic anemia; mild hypokalemia and hypomagnesemia with end stage renal dysfunction (she will receive RRT in AM and electrolytes will be addressed " "then); BNP is normal with minimal (expected) elevation of troponin (ESRD); procalcitonin is minimally elevated. CXR reviewed: NAPD. EKG reviewed: sinus with no acute segments    [facility] Chest: IMPRESSION:  "1. No evidence of acute pulmonary thromboembolism.  2. Multi focal nodular opacities that are established throughout both lungs likely infectious or postinflammatory be attributed to multilobar pneumonia.  3. Systolic dysfunction with prominent thickening of the ventricular myocardium.  4. Large zone of low density replacement of the left hepatic lobe some areas. Masslike in appearance the focal area measuring 6.4 x 4.7 cm perhaps metastatic or secondary to primary hepatic lesion. Recommend dedicated evaluation of the abdomen with tailored liver mass protocol evaluation."    Discussed with ED MD; Inpatient admission for COVID Pneumonia, Hypoxic Respiratory Failure; patient is 5 days out from Dx: will initiate COVID protocol; but also include ceftriaxone for possible bacterial pneumonia; continue home regimen for chronic maladies except holding po hypoglycemics with low dose insulin sliding scale; Nephrology consultation for RRT in AM (s/p CTA contrast); COVID isolation; AM labs for review; ECHO; supplemental oxygen per protocol.      Past Medical History:   Diagnosis Date    COVID-19     Diabetes mellitus     Dialysis patient     Hypertension     Mixed hyperlipidemia     Obese body habitus        History reviewed. No pertinent surgical history.    Review of patient's allergies indicates:   Allergen Reactions    Ace inhibitors        No current facility-administered medications on file prior to encounter.     Current Outpatient Medications on File Prior to Encounter   Medication Sig    amLODIPine (NORVASC) 5 MG tablet Take 5 mg by mouth 2 (two) times daily.    aspirin (ECOTRIN) 81 MG EC tablet Take 81 mg by mouth once daily.    calcitRIOL (ROCALTROL) 0.25 MCG Cap Take 0.25 mcg by mouth once daily.    " carvediloL (COREG) 12.5 MG tablet Take 12.5 mg by mouth 2 (two) times daily with meals.    cetirizine (ZYRTEC) 10 MG tablet Take 10 mg by mouth once daily.    cinacalcet HCl (CINACALCET ORAL) Take 30 mg by mouth once daily.    omeprazole (PRILOSEC) 40 MG capsule Take 40 mg by mouth once daily.    rosuvastatin (CRESTOR) 10 MG tablet Take 10 mg by mouth once daily.    SAXagliptin (ONGLYZA) 2.5 mg Tab tablet Take 2.5 mg by mouth once daily.     Family History    None       Tobacco Use    Smoking status: Not on file    Smokeless tobacco: Not on file   Substance and Sexual Activity    Alcohol use: Not on file    Drug use: Not on file    Sexual activity: Not on file     Review of Systems   Constitutional:  Positive for fatigue.   HENT: Negative.     Eyes: Negative.    Respiratory:  Positive for cough and shortness of breath.    Cardiovascular: Negative.    Gastrointestinal: Negative.    Endocrine: Negative.    Genitourinary: Negative.    Musculoskeletal: Negative.    Skin: Negative.    Allergic/Immunologic: Negative.    Neurological: Negative.    Hematological: Negative.    All other systems reviewed and are negative.  Objective:     Vital Signs (Most Recent):  Temp: 100 °F (37.8 °C) (01/07/23 1619)  Pulse: 85 (01/07/23 2304)  Resp: 20 (01/07/23 1619)  BP: (!) 157/64 (01/07/23 2259)  SpO2: 95 % (01/07/23 2304)   Vital Signs (24h Range):  Temp:  [100 °F (37.8 °C)] 100 °F (37.8 °C)  Pulse:  [83-92] 85  Resp:  [20] 20  SpO2:  [95 %-98 %] 95 %  BP: (139-183)/(63-76) 157/64     Weight: 136.1 kg (300 lb)  Body mass index is 48.42 kg/m².    Physical Exam  Vitals and nursing note reviewed.   Constitutional:       Appearance: She is well-developed. She is obese.   HENT:      Head: Normocephalic and atraumatic.      Right Ear: External ear normal.      Left Ear: External ear normal.      Nose: Nose normal.   Eyes:      Conjunctiva/sclera: Conjunctivae normal.      Pupils: Pupils are equal, round, and reactive to light.    Cardiovascular:      Rate and Rhythm: Normal rate and regular rhythm.      Heart sounds: Normal heart sounds.   Pulmonary:      Effort: Pulmonary effort is normal.      Breath sounds: Normal breath sounds.      Comments: Very decreased entry bases without adventitious sounds currently  Abdominal:      General: Bowel sounds are normal.      Palpations: Abdomen is soft.   Musculoskeletal:         General: Normal range of motion.      Cervical back: Normal range of motion and neck supple.   Skin:     General: Skin is warm and dry.      Capillary Refill: Capillary refill takes less than 2 seconds.   Neurological:      Mental Status: She is alert and oriented to person, place, and time.   Psychiatric:         Behavior: Behavior normal.         Thought Content: Thought content normal.         Judgment: Judgment normal.         CRANIAL NERVES     CN III, IV, VI   Pupils are equal, round, and reactive to light.     Significant Labs: All pertinent labs within the past 24 hours have been reviewed.  CBC:   Recent Labs   Lab 01/07/23  1701   WBC 4.49   HGB 10.5*   HCT 32.9*        CMP:   Recent Labs   Lab 01/07/23  1701      K 3.3*   CL 93*   CO2 32*   GLU 83   BUN 18   CREATININE 6.4*   CALCIUM 8.1*   PROT 7.1   ALBUMIN 3.1*   BILITOT 0.4   ALKPHOS 43*   AST 20   ALT 13   ANIONGAP 13     Cardiac Markers:   Recent Labs   Lab 01/07/23  1701   BNP 46     Troponin:   Recent Labs   Lab 01/07/23  1701 01/07/23 2006   TROPONINIHS 26.9*  26.6* 26.2*       Significant Imaging: I have reviewed all pertinent imaging results/findings within the past 24 hours.    Assessment/Plan:     * Pneumonia due to COVID-19 virus  Patient is identified as Severe COVID-19 based on hypoxemia with O2 saturations <94% on room air or on ambulation   Initiate standard COVID protocols; COVID-19 testing Collection Date: 3/5/2022 Collection Time:  10:38 AM ,Infection Control notification  and isolation- respiratory, contact and droplet per  protocol    Diagnostics: (leukopenia, hyponatremia, hyperferritinemia, elevated troponin, elevated d-dimer, age, and comorbidities are significant predictors of poor clinical outcome)  CBC, CMP, Ferritin, CRP and Portable CXR    Management: Inhaled bronchodilators as needed for shortness of breath.     Inpatient admission for COVID Pneumonia, Hypoxic Respiratory Failure; patient is 5 days out from Dx: will initiate COVID protocol; but also include ceftriaxone for possible bacterial pneumonia; continue home regimen for chronic maladies except holding po hypoglycemics with low dose insulin sliding scale; Nephrology consultation for RRT in AM (s/p CTA contrast); COVID isolation; AM labs for review; ECHO; supplemental oxygen per protocol    Type 2 diabetes mellitus without complication, without long-term current use of insulin  Inpatient admission for COVID Pneumonia, Hypoxic Respiratory Failure; patient is 5 days out from Dx: will initiate COVID protocol; but also include ceftriaxone for possible bacterial pneumonia; continue home regimen for chronic maladies except holding po hypoglycemics with low dose insulin sliding scale; Nephrology consultation for RRT in AM (s/p CTA contrast); COVID isolation; AM labs for review; ECHO; supplemental oxygen per protocol    Primary hypertension  Inpatient admission for COVID Pneumonia, Hypoxic Respiratory Failure; patient is 5 days out from Dx: will initiate COVID protocol; but also include ceftriaxone for possible bacterial pneumonia; continue home regimen for chronic maladies except holding po hypoglycemics with low dose insulin sliding scale; Nephrology consultation for RRT in AM (s/p CTA contrast); COVID isolation; AM labs for review; ECHO; supplemental oxygen per protocol      ESRD (end stage renal disease)  Inpatient admission for COVID Pneumonia, Hypoxic Respiratory Failure; patient is 5 days out from Dx: will initiate COVID protocol; but also include ceftriaxone for  possible bacterial pneumonia; continue home regimen for chronic maladies except holding po hypoglycemics with low dose insulin sliding scale; Nephrology consultation for RRT in AM (s/p CTA contrast); COVID isolation; AM labs for review; ECHO; supplemental oxygen per protocol        VTE Risk Mitigation (From admission, onward)    None             Zachary Gil MD  Department of Hospital Medicine   [facility] - Emergency Dept

## 2023-01-08 NOTE — ASSESSMENT & PLAN NOTE
Inpatient admission for COVID Pneumonia, Hypoxic Respiratory Failure; patient is 5 days out from Dx: will initiate COVID protocol; but also include ceftriaxone for possible bacterial pneumonia; continue home regimen for chronic maladies except holding po hypoglycemics with low dose insulin sliding scale; Nephrology consultation for RRT in AM (s/p CTA contrast); COVID isolation; AM labs for review; ECHO; supplemental oxygen per protocol

## 2023-01-08 NOTE — HPI
"62 year of female with history of Polycystic Kidney with ESRD (HD M,W,F), HTN, DM 2, HLD, Pericardial window (Pericarditis), COVID positive 5 days ago presented to ED complaining of worsening SOBE, and dry cough. She cannnot walk to bathroom without stopping to catch her breath. In ED her oxygen saturation was low 90's at rest and fell to mid to low 80's with ambulation. She feels "Miserable". Denied any pain. No orthopnea, or PND. Had RRT today (usually M,W,F--but was changed to T,T,S when tested positive for COVID because her RRT center was changed to accept her with COVID infection). She went to [facility] because of COVID Dx and SOB (increased risk of PE). No PE, but mass like lesion was seen in her liver. Discussed with patient and she stated that she had been told that before, and it was "From my cystic kidney disease". Masses are not described as such. Outpatient follow-up of liver lesion was recommended to patient and should be re-emphasized at discharge. The patient has moved from [place] to here, some records are available through [facility], but no detailed information is currently available.    In ED Labs reviewed: no leukocytosis with expected minimal normocytic anemia; mild hypokalemia and hypomagnesemia with end stage renal dysfunction (she will receive RRT in AM and electrolytes will be addressed then); BNP is normal with minimal (expected) elevation of troponin (ESRD); procalcitonin is minimally elevated. CXR reviewed: NAPD. EKG reviewed: sinus with no acute segments    [facility] Chest: IMPRESSION:  "1. No evidence of acute pulmonary thromboembolism.  2. Multi focal nodular opacities that are established throughout both lungs likely infectious or postinflammatory be attributed to multilobar pneumonia.  3. Systolic dysfunction with prominent thickening of the ventricular myocardium.  4. Large zone of low density replacement of the left hepatic lobe some areas. Masslike in appearance the focal area " "measuring 6.4 x 4.7 cm perhaps metastatic or secondary to primary hepatic lesion. Recommend dedicated evaluation of the abdomen with tailored liver mass protocol evaluation."    Discussed with ED MD; Inpatient admission for COVID Pneumonia, Hypoxic Respiratory Failure; patient is 5 days out from Dx: will initiate COVID protocol; but also include ceftriaxone for possible bacterial pneumonia; continue home regimen for chronic maladies except holding po hypoglycemics with low dose insulin sliding scale; Nephrology consultation for RRT in AM (s/p CTA contrast); COVID isolation; AM labs for review; ECHO; supplemental oxygen per protocol.  "

## 2023-01-08 NOTE — PROGRESS NOTES
Automatic Inhaler to Nebulizer Interchange    albuterol (Ventolin, ProAir, or Proventil)  mcg given multiple times per day changed to albuterol 2.5 mg Q6H  per [facility] Automatic Therapeutic Substitutions Protocol.    Please contact pharmacy at extension [phone] with any questions.     Thank you,   Mehran Lim

## 2023-01-08 NOTE — CONSULTS
" INPATIENT NEPHROLOGY CONSULT   [facility] NEPHROLOGY    Marian Dumont  01/08/2023    Reason for consultation:    esrd    Chief Complaint:   Chief Complaint   Patient presents with    POSITIVE COVID     TESTED POSITIVE MONDAY    Shortness of Breath    Fatigue          History of Present Illness:     Per H and P    62 year of female with history of Polycystic Kidney with ESRD (HD M,W,F), HTN, DM 2, HLD, Pericardial window (Pericarditis), COVID positive 5 days ago presented to ED complaining of worsening SOBE, and dry cough. She cannnot walk to bathroom without stopping to catch her breath. In ED her oxygen saturation was low 90's at rest and fell to mid to low 80's with ambulation. She feels "Miserable". Denied any pain. No orthopnea, or PND. Had RRT today (usually M,W,F--but was changed to T,T,S when tested positive for COVID because her RRT center was changed to accept her with COVID infection). She went to [facility] because of COVID Dx and SOB (increased risk of PE). No PE, but mass like lesion was seen in her liver. Discussed with patient and she stated that she had been told that before, and it was "From my cystic kidney disease". Masses are not described as such. Outpatient follow-up of liver lesion was recommended to patient and should be re-emphasized at discharge.     1/8  No nausea, chest pain, sob, fever, urinary or bowel complaint, new neurologic symptoms, new joint pain      Plan of Care:       Assessment:    Esrd--dialysis via AVG  --continue dialysis per routine  --fluid restrict  --renal dose medication per routine  --continue outpt medication  --continue binders with meals    Anemia  --erythropoiesis stimulating agent with renal replacement therapy    H/o Hyperphosphatemia  --renal diet  --continue binders    Hypertension  --uf with hd  --fluid restrict  --low salt diet  --continue home medication     Hypomagnesemia   --replete    Hypertension  --continue amlodipine and coreg  --low salt diet  --uf " as tolerated with hd    Secondary hyperparathyroidism  --continue calcitriol and sensipar  --low phosphorus diet    Thank you for allowing us to participate in this patient's care. We will continue to follow.    Vital Signs:  Temp Readings from Last 3 Encounters:   01/08/23 98.4 °F (36.9 °C) (Oral)       Pulse Readings from Last 3 Encounters:   01/08/23 76       BP Readings from Last 3 Encounters:   01/08/23 (!) 157/65       Weight:  Wt Readings from Last 3 Encounters:   01/08/23 135.4 kg (298 lb 9.6 oz)   01/08/23 135.4 kg (298 lb 8.1 oz)       Past Medical & Surgical History:  Past Medical History:   Diagnosis Date    COVID-19     Diabetes mellitus     Dialysis patient     Hypertension     Mixed hyperlipidemia     Obese body habitus        History reviewed. No pertinent surgical history.    Past Social History:  Social History     Socioeconomic History    Marital status:        Medications:  No current facility-administered medications on file prior to encounter.     Current Outpatient Medications on File Prior to Encounter   Medication Sig Dispense Refill    amLODIPine (NORVASC) 5 MG tablet Take 5 mg by mouth 2 (two) times daily.      aspirin (ECOTRIN) 81 MG EC tablet Take 81 mg by mouth once daily.      calcitRIOL (ROCALTROL) 0.25 MCG Cap Take 0.25 mcg by mouth once daily.      carvediloL (COREG) 12.5 MG tablet Take 12.5 mg by mouth 2 (two) times daily with meals.      cetirizine (ZYRTEC) 10 MG tablet Take 10 mg by mouth once daily.      cinacalcet HCl (CINACALCET ORAL) Take 30 mg by mouth once daily.      omeprazole (PRILOSEC) 40 MG capsule Take 40 mg by mouth once daily.      rosuvastatin (CRESTOR) 10 MG tablet Take 10 mg by mouth once daily.      SAXagliptin (ONGLYZA) 2.5 mg Tab tablet Take 2.5 mg by mouth once daily.       Scheduled Meds:   albuterol sulfate  2.5 mg Nebulization Q8H    amLODIPine  5 mg Oral BID    ascorbic acid (vitamin C)  500 mg Oral BID    aspirin  81 mg Oral Daily    atorvastatin  " 40 mg Oral Daily    calcitRIOL  0.25 mcg Oral Daily    carvediloL  12.5 mg Oral BID WM    cefTRIAXone (ROCEPHIN) IVPB  1 g Intravenous Q24H    cetirizine  10 mg Oral Daily    cinacalcet  30 mg Oral Daily    dexAMETHasone  6 mg Oral Daily    enoxaparin  1 mg/kg Subcutaneous Q24H    multivitamin  1 tablet Oral Daily    pantoprazole  40 mg Oral Before breakfast    [START ON 1/9/2023] remdesivir infusion  100 mg Intravenous Daily     Continuous Infusions:  PRN Meds:.acetaminophen, dextromethorphan-guaiFENesin  mg/5 ml, dextrose 10%, dextrose 10%, glucagon (human recombinant), glucose, glucose, HYDROcodone-acetaminophen, melatonin, ondansetron, sodium chloride 0.9%    Allergies:  Ace inhibitors    Past Family History:  Reviewed; refer to Hospitalist Admission Note    Review of Systems:  Review of Systems - All 14 systems reviewed and negative, except as noted in HPI    Physical Exam:    BP (!) 157/65   Pulse 76   Temp 98.4 °F (36.9 °C) (Oral)   Resp 18   Ht 5' 6" (1.676 m)   Wt 135.4 kg (298 lb 9.6 oz)   SpO2 96%   BMI 48.20 kg/m²     General Appearance:    Alert, cooperative, no distress, appears stated age   Head:    Normocephalic, without obvious abnormality, atraumatic   Eyes:    PER, conjunctiva/corneas clear, EOM's intact in both eyes        Throat:   Lips, mucosa, and tongue normal; teeth and gums normal   Back:     Symmetric, no curvature, ROM normal, no CVA tenderness   Lungs:     Clear to auscultation bilaterally, respirations unlabored   Chest wall:    No tenderness or deformity   Heart:    Regular rate and rhythm, S1 and S2 normal, no murmur, rub   or gallop   Abdomen:     Soft, non-tender, bowel sounds active all four quadrants,     no masses, no organomegaly   Extremities:   Extremities normal, atraumatic, no cyanosis or edema   Pulses:   2+ and symmetric all extremities   MSK:   No joint or muscle swelling, tenderness or deformity   Skin:   Skin color, texture, turgor normal, no rashes or " lesions   Neurologic:   CNII-XII intact, normal strength and sensation       Throughout.  No flap     Results:  Lab Results   Component Value Date     01/08/2023     01/08/2023    K 3.5 01/08/2023    K 3.5 01/08/2023    CL 95 01/08/2023    CL 95 01/08/2023    CO2 30 (H) 01/08/2023    CO2 30 (H) 01/08/2023    BUN 21 01/08/2023    BUN 21 01/08/2023    CREATININE 7.3 (H) 01/08/2023    CREATININE 7.3 (H) 01/08/2023    CALCIUM 7.6 (L) 01/08/2023    CALCIUM 7.6 (L) 01/08/2023    ANIONGAP 13 01/08/2023    ANIONGAP 13 01/08/2023       Lab Results   Component Value Date    CALCIUM 7.6 (L) 01/08/2023    CALCIUM 7.6 (L) 01/08/2023    PHOS 3.6 01/08/2023    PHOS 3.6 01/08/2023       Recent Labs   Lab 01/07/23  1701   WBC 4.49   RBC 3.63*   HGB 10.5*   HCT 32.9*      MCV 91   MCH 28.9   MCHC 31.9*     Patient care was time spent personally by me on the following activities:   Obtaining a history  Examination of patient.  Providing medical care at the patients bedside.  Developing a treatment plan with patient or surrogate and bedside caregivers  Ordering and reviewing laboratory studies, radiographic studies, pulse oximetry.  Ordering and performing treatments and interventions.  Evaluation of patient's response to treatment.  Discussions with consultants while on the unit and immediately available to the patient.  Re-evaluation of the patient's condition.  Documentation in the medical record.        I have personally reviewed pertinent radiological imaging and reports.    Lorne Gómez MD  Nephrology  [facility] [place]  [phone]

## 2023-01-08 NOTE — PROGRESS NOTES
Patient seen and examined today.  She says she is feeling much better since being admitted.  She is currently on 4 L of oxygen and has no current complaints.  Will continue current therapy as ordered including remdesivir, steroids and IV antibiotics.

## 2023-01-08 NOTE — SUBJECTIVE & OBJECTIVE
Past Medical History:   Diagnosis Date    COVID-19     Diabetes mellitus     Dialysis patient     Hypertension     Mixed hyperlipidemia     Obese body habitus        History reviewed. No pertinent surgical history.    Review of patient's allergies indicates:   Allergen Reactions    Ace inhibitors        No current facility-administered medications on file prior to encounter.     Current Outpatient Medications on File Prior to Encounter   Medication Sig    amLODIPine (NORVASC) 5 MG tablet Take 5 mg by mouth 2 (two) times daily.    aspirin (ECOTRIN) 81 MG EC tablet Take 81 mg by mouth once daily.    calcitRIOL (ROCALTROL) 0.25 MCG Cap Take 0.25 mcg by mouth once daily.    carvediloL (COREG) 12.5 MG tablet Take 12.5 mg by mouth 2 (two) times daily with meals.    cetirizine (ZYRTEC) 10 MG tablet Take 10 mg by mouth once daily.    cinacalcet HCl (CINACALCET ORAL) Take 30 mg by mouth once daily.    omeprazole (PRILOSEC) 40 MG capsule Take 40 mg by mouth once daily.    rosuvastatin (CRESTOR) 10 MG tablet Take 10 mg by mouth once daily.    SAXagliptin (ONGLYZA) 2.5 mg Tab tablet Take 2.5 mg by mouth once daily.     Family History    None       Tobacco Use    Smoking status: Not on file    Smokeless tobacco: Not on file   Substance and Sexual Activity    Alcohol use: Not on file    Drug use: Not on file    Sexual activity: Not on file     Review of Systems   Constitutional:  Positive for fatigue.   HENT: Negative.     Eyes: Negative.    Respiratory:  Positive for cough and shortness of breath.    Cardiovascular: Negative.    Gastrointestinal: Negative.    Endocrine: Negative.    Genitourinary: Negative.    Musculoskeletal: Negative.    Skin: Negative.    Allergic/Immunologic: Negative.    Neurological: Negative.    Hematological: Negative.    All other systems reviewed and are negative.  Objective:     Vital Signs (Most Recent):  Temp: 100 °F (37.8 °C) (01/07/23 1619)  Pulse: 85 (01/07/23 2304)  Resp: 20 (01/07/23 1619)  BP:  (!) 157/64 (01/07/23 2259)  SpO2: 95 % (01/07/23 2304)   Vital Signs (24h Range):  Temp:  [100 °F (37.8 °C)] 100 °F (37.8 °C)  Pulse:  [83-92] 85  Resp:  [20] 20  SpO2:  [95 %-98 %] 95 %  BP: (139-183)/(63-76) 157/64     Weight: 136.1 kg (300 lb)  Body mass index is 48.42 kg/m².    Physical Exam  Vitals and nursing note reviewed.   Constitutional:       Appearance: She is well-developed. She is obese.   HENT:      Head: Normocephalic and atraumatic.      Right Ear: External ear normal.      Left Ear: External ear normal.      Nose: Nose normal.   Eyes:      Conjunctiva/sclera: Conjunctivae normal.      Pupils: Pupils are equal, round, and reactive to light.   Cardiovascular:      Rate and Rhythm: Normal rate and regular rhythm.      Heart sounds: Normal heart sounds.   Pulmonary:      Effort: Pulmonary effort is normal.      Breath sounds: Normal breath sounds.      Comments: Very decreased entry bases without adventitious sounds currently  Abdominal:      General: Bowel sounds are normal.      Palpations: Abdomen is soft.   Musculoskeletal:         General: Normal range of motion.      Cervical back: Normal range of motion and neck supple.   Skin:     General: Skin is warm and dry.      Capillary Refill: Capillary refill takes less than 2 seconds.   Neurological:      Mental Status: She is alert and oriented to person, place, and time.   Psychiatric:         Behavior: Behavior normal.         Thought Content: Thought content normal.         Judgment: Judgment normal.         CRANIAL NERVES     CN III, IV, VI   Pupils are equal, round, and reactive to light.     Significant Labs: All pertinent labs within the past 24 hours have been reviewed.  CBC:   Recent Labs   Lab 01/07/23  1701   WBC 4.49   HGB 10.5*   HCT 32.9*        CMP:   Recent Labs   Lab 01/07/23  1701      K 3.3*   CL 93*   CO2 32*   GLU 83   BUN 18   CREATININE 6.4*   CALCIUM 8.1*   PROT 7.1   ALBUMIN 3.1*   BILITOT 0.4   ALKPHOS 43*   AST  20   ALT 13   ANIONGAP 13     Cardiac Markers:   Recent Labs   Lab 01/07/23  1701   BNP 46     Troponin:   Recent Labs   Lab 01/07/23  1701 01/07/23  2006   TROPONINIHS 26.9*  26.6* 26.2*       Significant Imaging: I have reviewed all pertinent imaging results/findings within the past 24 hours.

## 2023-01-08 NOTE — CARE UPDATE
01/08/23 0829   Patient Assessment/Suction   Level of Consciousness (AVPU) alert   Respiratory Effort Normal;Unlabored   Expansion/Accessory Muscles/Retractions expansion symmetric   All Lung Fields Breath Sounds diminished   Rhythm/Pattern, Respiratory unlabored   Cough Frequency frequent   Cough Type dry   PRE-TX-O2   Device (Oxygen Therapy) nasal cannula   $ Is the patient on Low Flow Oxygen? Yes   Flow (L/min) 4  (WEANED TO 2L)   SpO2 98 %   Pulse Oximetry Type Intermittent   $ Pulse Oximetry - Multiple Charge Pulse Oximetry - Multiple   Pulse 79   Resp (!) 22   Aerosol Therapy   $ Aerosol Therapy Charges Aerosol Treatment   Daily Review of Necessity (SVN) completed   Respiratory Treatment Status (SVN) given   Treatment Route (SVN) mouthpiece   Patient Position (SVN) semi-Huertas's   Post Treatment Assessment (SVN) increased aeration   Signs of Intolerance (SVN) none   Breath Sounds Post-Respiratory Treatment   Throughout All Fields Post-Treatment All Fields   Throughout All Fields Post-Treatment aeration increased   Post-treatment Heart Rate (beats/min) 79   Post-treatment Resp Rate (breaths/min) 22   Education   $ Education Bronchodilator;DME Oxygen;15 min   Respiratory Evaluation   $ Care Plan Tech Time 15 min

## 2023-01-09 PROBLEM — K76.9 LIVER LESION: Status: ACTIVE | Noted: 2023-01-09

## 2023-01-09 LAB
ALBUMIN SERPL BCP-MCNC: 3.2 G/DL (ref 3.5–5.2)
ALBUMIN SERPL BCP-MCNC: 3.2 G/DL (ref 3.5–5.2)
ALP SERPL-CCNC: 50 U/L (ref 55–135)
ALT SERPL W/O P-5'-P-CCNC: 12 U/L (ref 10–44)
ANION GAP SERPL CALC-SCNC: 15 MMOL/L (ref 8–16)
ANION GAP SERPL CALC-SCNC: 15 MMOL/L (ref 8–16)
AST SERPL-CCNC: 18 U/L (ref 10–40)
BILIRUB SERPL-MCNC: 0.2 MG/DL (ref 0.1–1)
BUN SERPL-MCNC: 35 MG/DL (ref 8–23)
BUN SERPL-MCNC: 35 MG/DL (ref 8–23)
CALCIUM SERPL-MCNC: 7.8 MG/DL (ref 8.7–10.5)
CALCIUM SERPL-MCNC: 7.8 MG/DL (ref 8.7–10.5)
CHLORIDE SERPL-SCNC: 98 MMOL/L (ref 95–110)
CHLORIDE SERPL-SCNC: 98 MMOL/L (ref 95–110)
CO2 SERPL-SCNC: 27 MMOL/L (ref 23–29)
CO2 SERPL-SCNC: 27 MMOL/L (ref 23–29)
CREAT SERPL-MCNC: 10.3 MG/DL (ref 0.5–1.4)
CREAT SERPL-MCNC: 10.3 MG/DL (ref 0.5–1.4)
EST. GFR  (NO RACE VARIABLE): 3.8 ML/MIN/1.73 M^2
EST. GFR  (NO RACE VARIABLE): 3.8 ML/MIN/1.73 M^2
GLUCOSE SERPL-MCNC: 161 MG/DL (ref 70–110)
GLUCOSE SERPL-MCNC: 161 MG/DL (ref 70–110)
MAGNESIUM SERPL-MCNC: 1.7 MG/DL (ref 1.6–2.6)
PHOSPHATE SERPL-MCNC: 3.3 MG/DL (ref 2.7–4.5)
PHOSPHATE SERPL-MCNC: 3.3 MG/DL (ref 2.7–4.5)
POTASSIUM SERPL-SCNC: 4 MMOL/L (ref 3.5–5.1)
POTASSIUM SERPL-SCNC: 4 MMOL/L (ref 3.5–5.1)
PROT SERPL-MCNC: 7.2 G/DL (ref 6–8.4)
SODIUM SERPL-SCNC: 140 MMOL/L (ref 136–145)
SODIUM SERPL-SCNC: 140 MMOL/L (ref 136–145)

## 2023-01-09 PROCEDURE — 94640 AIRWAY INHALATION TREATMENT: CPT

## 2023-01-09 PROCEDURE — 36415 COLL VENOUS BLD VENIPUNCTURE: CPT | Performed by: INTERNAL MEDICINE

## 2023-01-09 PROCEDURE — 83735 ASSAY OF MAGNESIUM: CPT | Performed by: INTERNAL MEDICINE

## 2023-01-09 PROCEDURE — 12000002 HC ACUTE/MED SURGE SEMI-PRIVATE ROOM

## 2023-01-09 PROCEDURE — 25000003 PHARM REV CODE 250: Performed by: INTERNAL MEDICINE

## 2023-01-09 PROCEDURE — 94799 UNLISTED PULMONARY SVC/PX: CPT

## 2023-01-09 PROCEDURE — 99900035 HC TECH TIME PER 15 MIN (STAT)

## 2023-01-09 PROCEDURE — 99900031 HC PATIENT EDUCATION (STAT)

## 2023-01-09 PROCEDURE — 63600175 PHARM REV CODE 636 W HCPCS: Performed by: INTERNAL MEDICINE

## 2023-01-09 PROCEDURE — 80053 COMPREHEN METABOLIC PANEL: CPT | Performed by: INTERNAL MEDICINE

## 2023-01-09 PROCEDURE — 27000221 HC OXYGEN, UP TO 24 HOURS

## 2023-01-09 PROCEDURE — 25000242 PHARM REV CODE 250 ALT 637 W/ HCPCS: Performed by: INTERNAL MEDICINE

## 2023-01-09 PROCEDURE — 94761 N-INVAS EAR/PLS OXIMETRY MLT: CPT

## 2023-01-09 PROCEDURE — 84100 ASSAY OF PHOSPHORUS: CPT | Performed by: INTERNAL MEDICINE

## 2023-01-09 PROCEDURE — 27100171 HC OXYGEN HIGH FLOW UP TO 24 HOURS

## 2023-01-09 RX ADMIN — ALBUTEROL SULFATE 2.5 MG: 2.5 SOLUTION RESPIRATORY (INHALATION) at 02:01

## 2023-01-09 RX ADMIN — CARVEDILOL 12.5 MG: 12.5 TABLET, FILM COATED ORAL at 08:01

## 2023-01-09 RX ADMIN — ASPIRIN 81 MG: 81 TABLET, COATED ORAL at 08:01

## 2023-01-09 RX ADMIN — CEFTRIAXONE 1 G: 1 INJECTION, SOLUTION INTRAVENOUS at 12:01

## 2023-01-09 RX ADMIN — Medication 400 MG: at 08:01

## 2023-01-09 RX ADMIN — AMLODIPINE BESYLATE 5 MG: 5 TABLET ORAL at 08:01

## 2023-01-09 RX ADMIN — ENOXAPARIN SODIUM 100 MG: 100 INJECTION SUBCUTANEOUS at 08:01

## 2023-01-09 RX ADMIN — ENOXAPARIN SODIUM 40 MG: 40 INJECTION SUBCUTANEOUS at 09:01

## 2023-01-09 RX ADMIN — OXYCODONE HYDROCHLORIDE AND ACETAMINOPHEN 500 MG: 500 TABLET ORAL at 08:01

## 2023-01-09 RX ADMIN — ALBUTEROL SULFATE 2.5 MG: 2.5 SOLUTION RESPIRATORY (INHALATION) at 08:01

## 2023-01-09 RX ADMIN — CALCITRIOL CAPSULES 0.25 MCG 0.25 MCG: 0.25 CAPSULE ORAL at 08:01

## 2023-01-09 RX ADMIN — ATORVASTATIN CALCIUM 40 MG: 40 TABLET, FILM COATED ORAL at 08:01

## 2023-01-09 RX ADMIN — PANTOPRAZOLE SODIUM 40 MG: 40 TABLET, DELAYED RELEASE ORAL at 05:01

## 2023-01-09 RX ADMIN — CARVEDILOL 12.5 MG: 12.5 TABLET, FILM COATED ORAL at 05:01

## 2023-01-09 RX ADMIN — CINACALCET 30 MG: 30 TABLET, FILM COATED ORAL at 08:01

## 2023-01-09 RX ADMIN — THERA TABS 1 TABLET: TAB at 08:01

## 2023-01-09 RX ADMIN — MUPIROCIN 1 G: 20 OINTMENT TOPICAL at 08:01

## 2023-01-09 RX ADMIN — REMDESIVIR 100 MG: 100 INJECTION, POWDER, LYOPHILIZED, FOR SOLUTION INTRAVENOUS at 09:01

## 2023-01-09 RX ADMIN — CETIRIZINE HYDROCHLORIDE 10 MG: 10 TABLET, FILM COATED ORAL at 08:01

## 2023-01-09 RX ADMIN — DEXAMETHASONE 6 MG: 2 TABLET ORAL at 08:01

## 2023-01-09 NOTE — RESPIRATORY THERAPY
01/08/23 5122   Patient Assessment/Suction   Level of Consciousness (AVPU) alert   Respiratory Effort Normal;Unlabored   Expansion/Accessory Muscles/Retractions no use of accessory muscles   All Lung Fields Breath Sounds equal bilaterally;diminished   Rhythm/Pattern, Respiratory unlabored   Cough Frequency frequent   Cough Type good;dry;nonproductive   PRE-TX-O2   Device (Oxygen Therapy) nasal cannula   Flow (L/min) 2   SpO2 96 %   Pulse Oximetry Type Intermittent   $ Pulse Oximetry - Multiple Charge Pulse Oximetry - Multiple   Pulse 81   Resp 18   Aerosol Therapy   $ Aerosol Therapy Charges Aerosol Treatment   Daily Review of Necessity (SVN) completed   Respiratory Treatment Status (SVN) given   Treatment Route (SVN) mouthpiece   Patient Position (SVN) semi-Huertas's   Post Treatment Assessment (SVN) breath sounds unchanged   Signs of Intolerance (SVN) none   Breath Sounds Post-Respiratory Treatment   Post-treatment Heart Rate (beats/min) 80   Post-treatment Resp Rate (breaths/min) 18   Peak Flow   PEFR PREtreatment (L/Min) 0   Education   $ Education Bronchodilator;15 min   Respiratory Evaluation   $ Care Plan Tech Time 15 min   $ Eval/Re-eval Charges Evaluation   Evaluation For New Orders

## 2023-01-09 NOTE — PROGRESS NOTES
[facility]  Progress Note    Patient Name: Marian Dumont  MRN: [id number]  Patient Class: IP- Inpatient   Admission Date: 1/7/2023  Length of Stay: 2 days  Attending Physician: Brennon Donohue MD  Primary Care Provider: Jonathan Tao MD        Subjective:     Principal Problem:Pneumonia due to COVID-19 virus    Interval History:     Patient for seen and examined and assumed care today  Respiratory status stable.  Discussed in detail with patient about liver lesions and will do the workup    Review of Systems  Objective:     Vital Signs (Most Recent):  Temp: 98.5 °F (36.9 °C) (01/09/23 1200)  Pulse: 80 (01/09/23 1451)  Resp: 16 (01/09/23 1451)  BP: (!) 148/80 (01/09/23 1200)  SpO2: 95 % (01/09/23 1451)   Vital Signs (24h Range):  Temp:  [97.9 °F (36.6 °C)-98.5 °F (36.9 °C)] 98.5 °F (36.9 °C)  Pulse:  [78-84] 80  Resp:  [16-18] 16  SpO2:  [93 %-100 %] 95 %  BP: (139-174)/(51-83) 148/80     Weight: 135.4 kg (298 lb 9.6 oz)  Body mass index is 48.2 kg/m².    Intake/Output Summary (Last 24 hours) at 1/9/2023 1517  Last data filed at 1/9/2023 1251  Gross per 24 hour   Intake 1080 ml   Output --   Net 1080 ml      Physical Exam    General: Patient resting comfortably in no acute distress. Appears as stated age. Calm  Eyes: EOM intact. No conjunctivae injection. No scleral icterus.  ENT: Hearing grossly intact. No discharge from ears. No nasal discharge.   CVS: RRR. No LE edema BL.  Lungs: CTA BL, no wheezing or crackles. Good breath sounds. No accessory muscle use. No acute respiratory distress  Neuro: non focal , Follows commands. Responds appropriately       Significant Labs: All pertinent labs within the past 24 hours have been reviewed.  CBC:   Recent Labs   Lab 01/07/23  1701   WBC 4.49   HGB 10.5*   HCT 32.9*        CMP:   Recent Labs   Lab 01/07/23  1701 01/08/23  0123 01/09/23  0805    138  138 140  140   K 3.3* 3.5  3.5 4.0  4.0   CL 93* 95  95 98  98   CO2  32* 30*  30* 27  27   GLU 83 128*  128* 161*  161*   BUN 18 21  21 35*  35*   CREATININE 6.4* 7.3*  7.3* 10.3*  10.3*   CALCIUM 8.1* 7.6*  7.6* 7.8*  7.8*   PROT 7.1 6.5 7.2   ALBUMIN 3.1* 3.1*  3.1* 3.2*  3.2*   BILITOT 0.4 0.6 0.2   ALKPHOS 43* 41* 50*   AST 20 18 18   ALT 13 11 12   ANIONGAP 13 13  13 15  15     Cardiac Markers:   Recent Labs   Lab 01/07/23  1701   BNP 46       Significant Imaging: I have reviewed all pertinent imaging results/findings within the past 24 hours.    Assessment/Plan:      Active Diagnoses:    Diagnosis Date Noted POA    PRINCIPAL PROBLEM:  Pneumonia due to COVID-19 virus [U07.1, J12.82] 01/08/2023 Yes    Liver lesion [K76.9] 01/09/2023 Unknown    ESRD (end stage renal disease) [N18.6] 01/08/2023 Yes    Primary hypertension [I10] 01/08/2023 Yes    Type 2 diabetes mellitus without complication, without long-term current use of insulin [E11.9] 01/08/2023 Yes      Problems Resolved During this Admission:       PLAN   Antibiotics and DEXA started since admission and continue  Continue remdesivir  Liver protocol CT scan abdomen.  Discussed with Nephrology and will do hemodialysis tomorrow  Continue the rest symptomatic treatments and oxygen support if needed  Follow  Nephrology for hemodialysis  Monitor inflammatory markers  Lovenox since D-dimer is elevated. ECHO normal     VTE Risk Mitigation (From admission, onward)           Ordered     enoxaparin injection 100 mg  Daily        See Hyperspace for full Linked Orders Report.    01/08/23 1642     enoxaparin injection 40 mg  Every 24 hours (non-standard times)        See Hyperspace for full Linked Orders Report.    01/08/23 1642     IP VTE HIGH RISK PATIENT  Once         01/08/23 0103     Place sequential compression device  Until discontinued         01/08/23 0103                       Brennon Donohue MD  Department of Hospital Medicine   [facility]

## 2023-01-09 NOTE — NURSING
01/09/2023    Report received from CRISTINO Ta.   Assumed care of patient. AOX4.   Assessment and vital signs assessed.   Labs and meds reviewed.  Last documentation =  Temp: 97.9 °F (36.6 °C) (01/09/23 0800)  Pulse: 82 (01/09/23 0828)  Resp: 16 (01/09/23 0828)  BP: (!) 174/83 (01/09/23 0800)  SpO2: 100 % (01/09/23 0828)   Sitting up at bedside eating breakfast.  Discussed all medications in length with patient.   No O2 in use, lungs sounds diminished, left clear, right sounds more diminished and difficult to auscultate.    1300  Patient still awaiting CT scan, needed a specific IV gauze and placement for testing. Awaiting arrival of PICC team for placement due to poor venous access.    1600  IV was successfully placed in RAC. CT scan competed. Patient back in room awaiting arrival of dialysis RN to perform treatment in room due to COVID isolation. Possible discharge following dialysis per Dr. Donohue. Will monitor for further plan of care.    1755  Patient receiving dialysis at this time. No needs voiced. WCTM.

## 2023-01-09 NOTE — PROGRESS NOTES
"INPATIENT NEPHROLOGY Progress Note  [facility] NEPHROLOGY    Marian Dumont  01/09/2023    Reason for consultation:    esrd    Chief Complaint:   Chief Complaint   Patient presents with    POSITIVE COVID     TESTED POSITIVE MONDAY    Shortness of Breath    Fatigue          History of Present Illness:     Per H and P    62 year of female with history of Polycystic Kidney with ESRD (HD M,W,F), HTN, DM 2, HLD, Pericardial window (Pericarditis), COVID positive 5 days ago presented to ED complaining of worsening SOBE, and dry cough. She cannnot walk to bathroom without stopping to catch her breath. In ED her oxygen saturation was low 90's at rest and fell to mid to low 80's with ambulation. She feels "Miserable". Denied any pain. No orthopnea, or PND. Had RRT today (usually M,W,F--but was changed to T,T,S when tested positive for COVID because her RRT center was changed to accept her with COVID infection). She went to CTA Chest because of COVID Dx and SOB (increased risk of PE). No PE, but mass like lesion was seen in her liver. Discussed with patient and she stated that she had been told that before, and it was "From my cystic kidney disease". Masses are not described as such. Outpatient follow-up of liver lesion was recommended to patient and should be re-emphasized at discharge.     1/8  No nausea, chest pain, sob, fever, urinary or bowel complaint, new neurologic symptoms, new joint pain  1/9 CTA chest reviewed    CTA chest  1. No evidence of acute pulmonary thromboembolism.  2. Multi focal nodular opacities that are established throughout both lungs likely infectious or postinflammatory be attributed to multilobar pneumonia.  3. Systolic dysfunction with prominent thickening of the ventricular myocardium.  4. Large zone of low density replacement of the left hepatic lobe some areas. Masslike in appearance the focal area measuring 6.4 x 4.7 cm perhaps metastatic or secondary to primary hepatic lesion. Recommend " dedicated evaluation of the abdomen with tailored liver mass protocol evaluation..     Echo:  The left ventricle is normal in size with moderate concentric hypertrophy and normal systolic function.  Moderate left atrial enlargement.  The estimated ejection fraction is 65%.  Normal left ventricular diastolic function.  Mild mitral regurgitation.  Mild right atrial enlargement.  Normal right ventricular size with normal right ventricular systolic function.  Normal central venous pressure (3 mmHg).    Plan of Care:     COVID PNA/HCAP  --dose meds for CrCl < 10/HD    Esrd--dialysis via AVG  --continue dialysis per routine  --fluid restrict  --renal dose medication per routine  --continue outpt medication  --continue binders with meals    Hypertension  --continue amlodipine and coreg  --low salt diet  --uf as tolerated with hd    Anemia  --erythropoiesis stimulating agent with renal replacement therapy  --SPEP/IF, SFLC, B2M for anemia and thickened myocardium      Secondary hyperparathyroidism  --continue calcitriol and sensipar  --low phosphorus diet    Liver lesions  --workup underway    Thank you for allowing us to participate in this patient's care. We will continue to follow.    Vital Signs:  Temp Readings from Last 3 Encounters:   01/09/23 98.5 °F (36.9 °C) (Oral)       Pulse Readings from Last 3 Encounters:   01/09/23 84       BP Readings from Last 3 Encounters:   01/09/23 (!) 148/80       Weight:  Wt Readings from Last 3 Encounters:   01/08/23 135.4 kg (298 lb 9.6 oz)   01/08/23 135.4 kg (298 lb 8.1 oz)     Medications:  No current facility-administered medications on file prior to encounter.     Current Outpatient Medications on File Prior to Encounter   Medication Sig Dispense Refill    amLODIPine (NORVASC) 5 MG tablet Take 5 mg by mouth 2 (two) times daily.      aspirin (ECOTRIN) 81 MG EC tablet Take 81 mg by mouth once daily.      calcitRIOL (ROCALTROL) 0.25 MCG Cap Take 0.25 mcg by mouth once daily.       "carvediloL (COREG) 12.5 MG tablet Take 12.5 mg by mouth 2 (two) times daily with meals.      cetirizine (ZYRTEC) 10 MG tablet Take 10 mg by mouth once daily.      cinacalcet HCl (CINACALCET ORAL) Take 30 mg by mouth once daily.      omeprazole (PRILOSEC) 40 MG capsule Take 40 mg by mouth once daily.      rosuvastatin (CRESTOR) 10 MG tablet Take 10 mg by mouth once daily.      SAXagliptin (ONGLYZA) 2.5 mg Tab tablet Take 2.5 mg by mouth once daily.       Scheduled Meds:   albuterol sulfate  2.5 mg Nebulization Q8H    amLODIPine  5 mg Oral BID    ascorbic acid (vitamin C)  500 mg Oral BID    aspirin  81 mg Oral Daily    atorvastatin  40 mg Oral Daily    calcitRIOL  0.25 mcg Oral Daily    carvediloL  12.5 mg Oral BID WM    cefTRIAXone (ROCEPHIN) IVPB  1 g Intravenous Q24H    cetirizine  10 mg Oral Daily    cinacalcet  30 mg Oral Daily    dexAMETHasone  6 mg Oral Daily    enoxparin  100 mg Subcutaneous Daily    And    enoxparin  40 mg Subcutaneous Q24H    [START ON 1/10/2023] epoetin jose-epbx  50 Units/kg Intravenous Every Tues, Thurs, Sat    magnesium oxide  400 mg Oral BID    multivitamin  1 tablet Oral Daily    mupirocin   Nasal BID    pantoprazole  40 mg Oral Before breakfast    remdesivir infusion  100 mg Intravenous Daily     Continuous Infusions:  PRN Meds:.acetaminophen, dextromethorphan-guaiFENesin  mg/5 ml, dextrose 10%, dextrose 10%, glucagon (human recombinant), glucose, glucose, HYDROcodone-acetaminophen, melatonin, ondansetron, sodium chloride 0.9%      Review of Systems:  Review of Systems - All 14 systems reviewed and negative, except as noted in HPI    Physical Exam:    BP (!) 148/80   Pulse 84   Temp 98.5 °F (36.9 °C) (Oral)   Resp 18   Ht 5' 6" (1.676 m)   Wt 135.4 kg (298 lb 9.6 oz)   SpO2 (!) 93%   BMI 48.20 kg/m²     General Appearance:    Alert, cooperative, no distress, appears stated age   Head:    Normocephalic, without obvious abnormality, atraumatic   Eyes:    PER, " conjunctiva/corneas clear, EOM's intact in both eyes        Throat:   Lips, mucosa, and tongue normal; teeth and gums normal   Back:     Symmetric, no curvature, ROM normal, no CVA tenderness   Lungs:     Clear to auscultation bilaterally, respirations unlabored   Chest wall:    No tenderness or deformity   Heart:    Regular rate and rhythm, S1 and S2 normal, no murmur, rub   or gallop   Abdomen:     Soft, non-tender, bowel sounds active all four quadrants,     no masses, no organomegaly   Extremities:   Extremities normal, atraumatic, no cyanosis or edema   Pulses:   2+ and symmetric all extremities   MSK:   No joint or muscle swelling, tenderness or deformity   Skin:   Skin color, texture, turgor normal, no rashes or lesions   Neurologic:   CNII-XII intact, normal strength and sensation       Throughout.  No flap     Results:  Lab Results   Component Value Date     01/09/2023     01/09/2023    K 4.0 01/09/2023    K 4.0 01/09/2023    CL 98 01/09/2023    CL 98 01/09/2023    CO2 27 01/09/2023    CO2 27 01/09/2023    BUN 35 (H) 01/09/2023    BUN 35 (H) 01/09/2023    CREATININE 10.3 (H) 01/09/2023    CREATININE 10.3 (H) 01/09/2023    CALCIUM 7.8 (L) 01/09/2023    CALCIUM 7.8 (L) 01/09/2023    ANIONGAP 15 01/09/2023    ANIONGAP 15 01/09/2023       Lab Results   Component Value Date    CALCIUM 7.8 (L) 01/09/2023    CALCIUM 7.8 (L) 01/09/2023    PHOS 3.3 01/09/2023    PHOS 3.3 01/09/2023       No results for input(s): WBC, RBC, HGB, HCT, PLT, MCV, MCH, MCHC in the last 24 hours.    Patient care was time spent personally by me on the following activities: > 35 min  Obtaining a history  Examination of patient.  Providing medical care at the patients bedside.  Developing a treatment plan with patient or surrogate and bedside caregivers  Ordering and reviewing laboratory studies, radiographic studies, pulse oximetry.  Ordering and performing treatments and interventions.  Evaluation of patient's response to  treatment.  Discussions with consultants while on the unit and immediately available to the patient.  Re-evaluation of the patient's condition.  Documentation in the medical record.        I have personally reviewed pertinent radiological imaging and reports.    Katherine Thornton MD MPH  [facility] [address] [address]  [phone] (p)  [phone] (f)

## 2023-01-09 NOTE — CARE UPDATE
01/09/23 0828   Patient Assessment/Suction   Level of Consciousness (AVPU) alert   Respiratory Effort Unlabored   Expansion/Accessory Muscles/Retractions no use of accessory muscles   All Lung Fields Breath Sounds Anterior:;diminished   Rhythm/Pattern, Respiratory unlabored   PRE-TX-O2   Device (Oxygen Therapy) nasal cannula   $ Is the patient on Low Flow Oxygen? Yes   $ Is the patient on High Flow Oxygen? Yes   Flow (L/min) 2   SpO2 100 %   Pulse Oximetry Type Intermittent   $ Pulse Oximetry - Multiple Charge Pulse Oximetry - Multiple   Pulse 82   Resp 16   Aerosol Therapy   $ Aerosol Therapy Charges Aerosol Treatment   Daily Review of Necessity (SVN) completed   Respiratory Treatment Status (SVN) given   Treatment Route (SVN) mouthpiece   Patient Position (SVN) semi-Huertas's   Post Treatment Assessment (SVN) breath sounds unchanged   Signs of Intolerance (SVN) none   Breath Sounds Post-Respiratory Treatment   Throughout All Fields Post-Treatment All Fields   Throughout All Fields Post-Treatment aeration increased   Post-treatment Heart Rate (beats/min) 83   Post-treatment Resp Rate (breaths/min) 16   Education   $ Education Bronchodilator;15 min   Respiratory Evaluation   $ Care Plan Tech Time 15 min   $ Eval/Re-eval Charges Evaluation

## 2023-01-10 LAB
ALBUMIN SERPL BCP-MCNC: 3 G/DL (ref 3.5–5.2)
ALBUMIN SERPL BCP-MCNC: 3 G/DL (ref 3.5–5.2)
ALP SERPL-CCNC: 56 U/L (ref 55–135)
ALT SERPL W/O P-5'-P-CCNC: 11 U/L (ref 10–44)
ANION GAP SERPL CALC-SCNC: 14 MMOL/L (ref 8–16)
ANION GAP SERPL CALC-SCNC: 14 MMOL/L (ref 8–16)
AST SERPL-CCNC: 17 U/L (ref 10–40)
BILIRUB SERPL-MCNC: 0.5 MG/DL (ref 0.1–1)
BUN SERPL-MCNC: 51 MG/DL (ref 8–23)
BUN SERPL-MCNC: 51 MG/DL (ref 8–23)
CALCIUM SERPL-MCNC: 7.5 MG/DL (ref 8.7–10.5)
CALCIUM SERPL-MCNC: 7.5 MG/DL (ref 8.7–10.5)
CHLORIDE SERPL-SCNC: 98 MMOL/L (ref 95–110)
CHLORIDE SERPL-SCNC: 98 MMOL/L (ref 95–110)
CO2 SERPL-SCNC: 27 MMOL/L (ref 23–29)
CO2 SERPL-SCNC: 27 MMOL/L (ref 23–29)
CREAT SERPL-MCNC: 11.5 MG/DL (ref 0.5–1.4)
CREAT SERPL-MCNC: 11.5 MG/DL (ref 0.5–1.4)
D DIMER PPP IA.FEU-MCNC: 3.48 MG/L FEU
EST. GFR  (NO RACE VARIABLE): 3.4 ML/MIN/1.73 M^2
EST. GFR  (NO RACE VARIABLE): 3.4 ML/MIN/1.73 M^2
FERRITIN SERPL-MCNC: 1300 NG/ML (ref 20–300)
GLUCOSE SERPL-MCNC: 224 MG/DL (ref 70–110)
GLUCOSE SERPL-MCNC: 224 MG/DL (ref 70–110)
LDH SERPL L TO P-CCNC: 193 U/L (ref 110–260)
MAGNESIUM SERPL-MCNC: 1.7 MG/DL (ref 1.6–2.6)
PHOSPHATE SERPL-MCNC: 3.4 MG/DL (ref 2.7–4.5)
PHOSPHATE SERPL-MCNC: 3.4 MG/DL (ref 2.7–4.5)
POTASSIUM SERPL-SCNC: 3.9 MMOL/L (ref 3.5–5.1)
POTASSIUM SERPL-SCNC: 3.9 MMOL/L (ref 3.5–5.1)
PROT SERPL-MCNC: 6.6 G/DL (ref 6–8.4)
SODIUM SERPL-SCNC: 139 MMOL/L (ref 136–145)
SODIUM SERPL-SCNC: 139 MMOL/L (ref 136–145)

## 2023-01-10 PROCEDURE — 83615 LACTATE (LD) (LDH) ENZYME: CPT | Performed by: INTERNAL MEDICINE

## 2023-01-10 PROCEDURE — 25000003 PHARM REV CODE 250: Performed by: INTERNAL MEDICINE

## 2023-01-10 PROCEDURE — 82728 ASSAY OF FERRITIN: CPT | Performed by: INTERNAL MEDICINE

## 2023-01-10 PROCEDURE — 83735 ASSAY OF MAGNESIUM: CPT | Performed by: INTERNAL MEDICINE

## 2023-01-10 PROCEDURE — 63600175 PHARM REV CODE 636 W HCPCS: Performed by: INTERNAL MEDICINE

## 2023-01-10 PROCEDURE — 90935 HEMODIALYSIS ONE EVALUATION: CPT

## 2023-01-10 PROCEDURE — 25500020 PHARM REV CODE 255: Performed by: INTERNAL MEDICINE

## 2023-01-10 PROCEDURE — 84100 ASSAY OF PHOSPHORUS: CPT | Performed by: INTERNAL MEDICINE

## 2023-01-10 PROCEDURE — 80053 COMPREHEN METABOLIC PANEL: CPT | Performed by: INTERNAL MEDICINE

## 2023-01-10 PROCEDURE — 36415 COLL VENOUS BLD VENIPUNCTURE: CPT | Performed by: INTERNAL MEDICINE

## 2023-01-10 PROCEDURE — 12000002 HC ACUTE/MED SURGE SEMI-PRIVATE ROOM

## 2023-01-10 PROCEDURE — 85379 FIBRIN DEGRADATION QUANT: CPT | Performed by: INTERNAL MEDICINE

## 2023-01-10 RX ORDER — IODIXANOL 320 MG/ML
100 INJECTION, SOLUTION INTRAVASCULAR
Status: COMPLETED | OUTPATIENT
Start: 2023-01-10 | End: 2023-01-10

## 2023-01-10 RX ADMIN — IODIXANOL 100 ML: 320 INJECTION, SOLUTION INTRAVASCULAR at 02:01

## 2023-01-10 RX ADMIN — CEFTRIAXONE 1 G: 1 INJECTION, SOLUTION INTRAVENOUS at 01:01

## 2023-01-10 RX ADMIN — MUPIROCIN 1 G: 20 OINTMENT TOPICAL at 09:01

## 2023-01-10 RX ADMIN — OXYCODONE HYDROCHLORIDE AND ACETAMINOPHEN 500 MG: 500 TABLET ORAL at 09:01

## 2023-01-10 RX ADMIN — PANTOPRAZOLE SODIUM 40 MG: 40 TABLET, DELAYED RELEASE ORAL at 05:01

## 2023-01-10 NOTE — PROGRESS NOTES
[facility]  Progress Note    Patient Name: Marian Dumont  MRN: [id number]  Patient Class: IP- Inpatient   Admission Date: 1/7/2023  Length of Stay: 3 days  Attending Physician: Brennon Donohue MD  Primary Care Provider: Jonathan Tao MD        Subjective:     Principal Problem:Pneumonia due to COVID-19 virus    Interval History:     Patient for seen and examined and assumed care today  Respiratory status stable.  Discussed in detail with patient about liver lesions and will do the workup    1/10  Not on oxygen . For CT liver protocol and HD today afterwards      Review of Systems  Objective:     Vital Signs (Most Recent):  Temp: 98.3 °F (36.8 °C) (01/10/23 1200)  Pulse: 86 (01/10/23 1200)  Resp: 18 (01/10/23 1200)  BP: (!) 164/81 (01/10/23 1200)  SpO2: 98 % (01/10/23 1200)   Vital Signs (24h Range):  Temp:  [97.6 °F (36.4 °C)-98.7 °F (37.1 °C)] 98.3 °F (36.8 °C)  Pulse:  [80-88] 86  Resp:  [18-19] 18  SpO2:  [93 %-98 %] 98 %  BP: (112-195)/(54-82) 164/81     Weight: 135.4 kg (298 lb 9.6 oz)  Body mass index is 48.2 kg/m².    Intake/Output Summary (Last 24 hours) at 1/10/2023 1554  Last data filed at 1/10/2023 1224  Gross per 24 hour   Intake 1190 ml   Output 600 ml   Net 590 ml        Physical Exam    General: Patient resting comfortably in no acute distress. Appears as stated age. Calm  Eyes: EOM intact. No conjunctivae injection. No scleral icterus.  ENT: Hearing grossly intact. No discharge from ears. No nasal discharge.   CVS: RRR. No LE edema BL.  Lungs: CTA BL, no wheezing or crackles. Good breath sounds. No accessory muscle use. No acute respiratory distress  Neuro: non focal , Follows commands. Responds appropriately       Significant Labs: All pertinent labs within the past 24 hours have been reviewed.  CBC:   No results for input(s): WBC, HGB, HCT, PLT in the last 48 hours.    CMP:   Recent Labs   Lab 01/09/23  0805 01/10/23  0110     140 139  139   K 4.0   4.0 3.9  3.9   CL 98  98 98  98   CO2 27  27 27  27   *  161* 224*  224*   BUN 35*  35* 51*  51*   CREATININE 10.3*  10.3* 11.5*  11.5*   CALCIUM 7.8*  7.8* 7.5*  7.5*   PROT 7.2 6.6   ALBUMIN 3.2*  3.2* 3.0*  3.0*   BILITOT 0.2 0.5   ALKPHOS 50* 56   AST 18 17   ALT 12 11   ANIONGAP 15  15 14  14       Cardiac Markers:   No results for input(s): CKMB, MYOGLOBIN, BNP, TROPISTAT in the last 48 hours.      Significant Imaging: I have reviewed all pertinent imaging results/findings within the past 24 hours.    Assessment/Plan:      Active Diagnoses:    Diagnosis Date Noted POA    PRINCIPAL PROBLEM:  Pneumonia due to COVID-19 virus [U07.1, J12.82] 01/08/2023 Yes    Liver lesion [K76.9] 01/09/2023 Unknown    ESRD (end stage renal disease) [N18.6] 01/08/2023 Yes    Primary hypertension [I10] 01/08/2023 Yes    Type 2 diabetes mellitus without complication, without long-term current use of insulin [E11.9] 01/08/2023 Yes      Problems Resolved During this Admission:       PLAN   Antibiotics and DEXA started since admission and continue  Continue remdesivir will give total 3 days   Liver protocol CT scan abdomen.  Discussed with Nephrology and will do hemodialysis afterwards   Continue the rest symptomatic treatments and oxygen support if needed  Follow  Nephrology for hemodialysis  Monitor inflammatory markers  Lovenox since D-dimer is elevated. ECHO normal     VTE Risk Mitigation (From admission, onward)           Ordered     enoxaparin injection 100 mg  Daily        See Hyperspace for full Linked Orders Report.    01/08/23 1642     enoxaparin injection 40 mg  Every 24 hours (non-standard times)        See Hyperspace for full Linked Orders Report.    01/08/23 1642     IP VTE HIGH RISK PATIENT  Once         01/08/23 0103     Place sequential compression device  Until discontinued         01/08/23 0103                       Brennon oDnohue MD  Department of Hospital Medicine   [facility]  [facility]

## 2023-01-10 NOTE — PROGRESS NOTES
"INPATIENT NEPHROLOGY Progress Note  [facility] NEPHROLOGY    Marian Dumont  01/10/2023    Reason for consultation:    esrd    Chief Complaint:   Chief Complaint   Patient presents with    POSITIVE COVID     TESTED POSITIVE MONDAY    Shortness of Breath    Fatigue          History of Present Illness:     Per H and P    62 year of female with history of Polycystic Kidney with ESRD (HD M,W,F), HTN, DM 2, HLD, Pericardial window (Pericarditis), COVID positive 5 days ago presented to ED complaining of worsening SOBE, and dry cough. She cannnot walk to bathroom without stopping to catch her breath. In ED her oxygen saturation was low 90's at rest and fell to mid to low 80's with ambulation. She feels "Miserable". Denied any pain. No orthopnea, or PND. Had RRT today (usually M,W,F--but was changed to T,T,S when tested positive for COVID because her RRT center was changed to accept her with COVID infection). She went to [facility] Chest because of COVID Dx and SOB (increased risk of PE). No PE, but mass like lesion was seen in her liver. Discussed with patient and she stated that she had been told that before, and it was "From my cystic kidney disease". Masses are not described as such. Outpatient follow-up of liver lesion was recommended to patient and should be re-emphasized at discharge.     1/8  No nausea, chest pain, sob, fever, urinary or bowel complaint, new neurologic symptoms, new joint pain  1/9 CTA chest reviewed    CTA chest  1. No evidence of acute pulmonary thromboembolism.  2. Multi focal nodular opacities that are established throughout both lungs likely infectious or postinflammatory be attributed to multilobar pneumonia.  3. Systolic dysfunction with prominent thickening of the ventricular myocardium.  4. Large zone of low density replacement of the left hepatic lobe some areas. Masslike in appearance the focal area measuring 6.4 x 4.7 cm perhaps metastatic or secondary to primary hepatic lesion. Recommend " dedicated evaluation of the abdomen with tailored liver mass protocol evaluation..     Echo:  The left ventricle is normal in size with moderate concentric hypertrophy and normal systolic function.  Moderate left atrial enlargement.  The estimated ejection fraction is 65%.  Normal left ventricular diastolic function.  Mild mitral regurgitation.  Mild right atrial enlargement.  Normal right ventricular size with normal right ventricular systolic function.  Normal central venous pressure (3 mmHg).    1/10- CT with IV contrast to evaluate liver lesions and then HD    Plan of Care:     COVID PNA/HCAP  --dose meds for CrCl < 10/HD    Esrd--dialysis via AVG  --continue dialysis per routine  --fluid restrict  --renal dose medication per routine  --continue outpt medication  --continue binders with meals    Hypertension  --continue amlodipine and coreg  --low salt diet  --uf as tolerated with hd    Anemia  --erythropoiesis stimulating agent with renal replacement therapy  --SPEP/IF, SFLC, B2M for anemia and thickened myocardium    Secondary hyperparathyroidism  --continue calcitriol and sensipar  --low phosphorus diet    Liver lesions  --workup underway    Thank you for allowing us to participate in this patient's care. We will continue to follow.    Vital Signs:  Temp Readings from Last 3 Encounters:   01/10/23 98.3 °F (36.8 °C) (Oral)       Pulse Readings from Last 3 Encounters:   01/10/23 86       BP Readings from Last 3 Encounters:   01/10/23 (!) 164/81       Weight:  Wt Readings from Last 3 Encounters:   01/08/23 135.4 kg (298 lb 9.6 oz)   01/08/23 135.4 kg (298 lb 8.1 oz)     Medications:  No current facility-administered medications on file prior to encounter.     Current Outpatient Medications on File Prior to Encounter   Medication Sig Dispense Refill    amLODIPine (NORVASC) 5 MG tablet Take 5 mg by mouth 2 (two) times daily.      aspirin (ECOTRIN) 81 MG EC tablet Take 81 mg by mouth once daily.      calcitRIOL  "(ROCALTROL) 0.25 MCG Cap Take 0.25 mcg by mouth once daily.      carvediloL (COREG) 12.5 MG tablet Take 12.5 mg by mouth 2 (two) times daily with meals.      cetirizine (ZYRTEC) 10 MG tablet Take 10 mg by mouth once daily.      cinacalcet HCl (CINACALCET ORAL) Take 30 mg by mouth once daily.      omeprazole (PRILOSEC) 40 MG capsule Take 40 mg by mouth once daily.      rosuvastatin (CRESTOR) 10 MG tablet Take 10 mg by mouth once daily.      SAXagliptin (ONGLYZA) 2.5 mg Tab tablet Take 2.5 mg by mouth once daily.       Scheduled Meds:   albuterol sulfate  2.5 mg Nebulization Q8H    amLODIPine  5 mg Oral BID    ascorbic acid (vitamin C)  500 mg Oral BID    aspirin  81 mg Oral Daily    atorvastatin  40 mg Oral Daily    calcitRIOL  0.25 mcg Oral Daily    carvediloL  12.5 mg Oral BID WM    cefTRIAXone (ROCEPHIN) IVPB  1 g Intravenous Q24H    cetirizine  10 mg Oral Daily    cinacalcet  30 mg Oral Daily    dexAMETHasone  6 mg Oral Daily    enoxparin  100 mg Subcutaneous Daily    And    enoxparin  40 mg Subcutaneous Q24H    epoetin jose-epbx  50 Units/kg Intravenous Every Tues, Thurs, Sat    magnesium oxide  400 mg Oral BID    multivitamin  1 tablet Oral Daily    mupirocin   Nasal BID    pantoprazole  40 mg Oral Before breakfast    remdesivir infusion  100 mg Intravenous Daily     Continuous Infusions:  PRN Meds:.acetaminophen, dextromethorphan-guaiFENesin  mg/5 ml, dextrose 10%, dextrose 10%, glucagon (human recombinant), glucose, glucose, HYDROcodone-acetaminophen, melatonin, ondansetron, sodium chloride 0.9%      Review of Systems:  Review of Systems - All 14 systems reviewed and negative, except as noted in HPI    Physical Exam:    BP (!) 164/81   Pulse 86   Temp 98.3 °F (36.8 °C) (Oral)   Resp 18   Ht 5' 6" (1.676 m)   Wt 135.4 kg (298 lb 9.6 oz)   SpO2 98%   BMI 48.20 kg/m²     Constitutional: nad, aao x 3  Heart: rrr, no m/r/g, wwp, no edema  Lungs: coarse, no w/r/r/c, no lb  Abdomen: s/nt/nd, " +BS    Results:  Lab Results   Component Value Date     01/10/2023     01/10/2023    K 3.9 01/10/2023    K 3.9 01/10/2023    CL 98 01/10/2023    CL 98 01/10/2023    CO2 27 01/10/2023    CO2 27 01/10/2023    BUN 51 (H) 01/10/2023    BUN 51 (H) 01/10/2023    CREATININE 11.5 (H) 01/10/2023    CREATININE 11.5 (H) 01/10/2023    CALCIUM 7.5 (L) 01/10/2023    CALCIUM 7.5 (L) 01/10/2023    ANIONGAP 14 01/10/2023    ANIONGAP 14 01/10/2023       Lab Results   Component Value Date    CALCIUM 7.5 (L) 01/10/2023    CALCIUM 7.5 (L) 01/10/2023    PHOS 3.4 01/10/2023    PHOS 3.4 01/10/2023       No results for input(s): WBC, RBC, HGB, HCT, PLT, MCV, MCH, MCHC in the last 24 hours.    Patient care was time spent personally by me on the following activities: > 35 min  Obtaining a history  Examination of patient.  Providing medical care at the patients bedside.  Developing a treatment plan with patient or surrogate and bedside caregivers  Ordering and reviewing laboratory studies, radiographic studies, pulse oximetry.  Ordering and performing treatments and interventions.  Evaluation of patient's response to treatment.  Discussions with consultants while on the unit and immediately available to the patient.  Re-evaluation of the patient's condition.  Documentation in the medical record.        I have personally reviewed pertinent radiological imaging and reports.    Katherine Thornton MD MPH  [facility] [place]  [address] [place] [address]  [place] [address]  [phone] (p)  [phone] (f)

## 2023-01-11 VITALS
WEIGHT: 293 LBS | BODY MASS INDEX: 47.09 KG/M2 | HEART RATE: 88 BPM | SYSTOLIC BLOOD PRESSURE: 133 MMHG | DIASTOLIC BLOOD PRESSURE: 82 MMHG | TEMPERATURE: 98 F | RESPIRATION RATE: 20 BRPM | HEIGHT: 66 IN | OXYGEN SATURATION: 98 %

## 2023-01-11 PROCEDURE — 25000242 PHARM REV CODE 250 ALT 637 W/ HCPCS: Performed by: INTERNAL MEDICINE

## 2023-01-11 PROCEDURE — 94799 UNLISTED PULMONARY SVC/PX: CPT

## 2023-01-11 PROCEDURE — 94640 AIRWAY INHALATION TREATMENT: CPT

## 2023-01-11 PROCEDURE — 25000003 PHARM REV CODE 250: Performed by: INTERNAL MEDICINE

## 2023-01-11 PROCEDURE — 99900031 HC PATIENT EDUCATION (STAT)

## 2023-01-11 PROCEDURE — 94761 N-INVAS EAR/PLS OXIMETRY MLT: CPT

## 2023-01-11 PROCEDURE — 99900035 HC TECH TIME PER 15 MIN (STAT)

## 2023-01-11 PROCEDURE — 63600175 PHARM REV CODE 636 W HCPCS: Performed by: INTERNAL MEDICINE

## 2023-01-11 RX ORDER — DEXAMETHASONE 6 MG/1
6 TABLET ORAL DAILY
Qty: 7 TABLET | Refills: 0 | Status: SHIPPED | OUTPATIENT
Start: 2023-01-11 | End: 2023-01-18

## 2023-01-11 RX ORDER — CEFUROXIME AXETIL 500 MG/1
500 TABLET ORAL EVERY 12 HOURS
Qty: 10 TABLET | Refills: 0 | Status: SHIPPED | OUTPATIENT
Start: 2023-01-11 | End: 2023-03-10

## 2023-01-11 RX ORDER — ONDANSETRON 4 MG/1
4 TABLET, FILM COATED ORAL 2 TIMES DAILY
Qty: 34 TABLET | Refills: 0 | Status: ON HOLD | OUTPATIENT
Start: 2023-01-11 | End: 2023-03-12 | Stop reason: SDUPTHER

## 2023-01-11 RX ADMIN — CEFTRIAXONE 1 G: 1 INJECTION, SOLUTION INTRAVENOUS at 01:01

## 2023-01-11 RX ADMIN — MUPIROCIN 1 G: 20 OINTMENT TOPICAL at 08:01

## 2023-01-11 RX ADMIN — OXYCODONE HYDROCHLORIDE AND ACETAMINOPHEN 500 MG: 500 TABLET ORAL at 08:01

## 2023-01-11 RX ADMIN — ALBUTEROL SULFATE 2.5 MG: 2.5 SOLUTION RESPIRATORY (INHALATION) at 07:01

## 2023-01-11 RX ADMIN — CETIRIZINE HYDROCHLORIDE 10 MG: 10 TABLET, FILM COATED ORAL at 08:01

## 2023-01-11 RX ADMIN — ATORVASTATIN CALCIUM 40 MG: 40 TABLET, FILM COATED ORAL at 08:01

## 2023-01-11 RX ADMIN — ONDANSETRON 4 MG: 2 INJECTION INTRAMUSCULAR; INTRAVENOUS at 04:01

## 2023-01-11 RX ADMIN — PANTOPRAZOLE SODIUM 40 MG: 40 TABLET, DELAYED RELEASE ORAL at 05:01

## 2023-01-11 RX ADMIN — CARVEDILOL 12.5 MG: 12.5 TABLET, FILM COATED ORAL at 08:01

## 2023-01-11 RX ADMIN — CALCITRIOL CAPSULES 0.25 MCG 0.25 MCG: 0.25 CAPSULE ORAL at 08:01

## 2023-01-11 RX ADMIN — AMLODIPINE BESYLATE 5 MG: 5 TABLET ORAL at 08:01

## 2023-01-11 RX ADMIN — ASPIRIN 81 MG: 81 TABLET, COATED ORAL at 08:01

## 2023-01-11 RX ADMIN — DEXAMETHASONE 6 MG: 2 TABLET ORAL at 08:01

## 2023-01-11 RX ADMIN — THERA TABS 1 TABLET: TAB at 08:01

## 2023-01-11 RX ADMIN — CINACALCET 30 MG: 30 TABLET, FILM COATED ORAL at 08:01

## 2023-01-11 NOTE — PLAN OF CARE
Discharge orders and chart reviewed with no further post-acute discharge needs identified at this time.  At this time, patient is cleared for discharge from Case Management standpoint.        01/11/23 1122   Final Note   Assessment Type Final Discharge Note   Anticipated Discharge Disposition Home   What phone number can be called within the next 1-3 days to see how you are doing after discharge? [phone]   Hospital Resources/Appts/Education Provided Appointments scheduled and added to AVS   Post-Acute Status   Discharge Delays None known at this time

## 2023-01-11 NOTE — PROGRESS NOTES
"INPATIENT NEPHROLOGY Progress Note  [facility] NEPHROLOGY    Marian Dumont  01/11/2023    Reason for consultation:    esrd    Chief Complaint:   Chief Complaint   Patient presents with    POSITIVE COVID     TESTED POSITIVE MONDAY    Shortness of Breath    Fatigue          History of Present Illness:     Per H and P    62 year of female with history of Polycystic Kidney with ESRD (HD M,W,F), HTN, DM 2, HLD, Pericardial window (Pericarditis), COVID positive 5 days ago presented to ED complaining of worsening SOBE, and dry cough. She cannnot walk to bathroom without stopping to catch her breath. In ED her oxygen saturation was low 90's at rest and fell to mid to low 80's with ambulation. She feels "Miserable". Denied any pain. No orthopnea, or PND. Had RRT today (usually M,W,F--but was changed to T,T,S when tested positive for COVID because her RRT center was changed to accept her with COVID infection). She went to [facility] Chest because of COVID Dx and SOB (increased risk of PE). No PE, but mass like lesion was seen in her liver. Discussed with patient and she stated that she had been told that before, and it was "From my cystic kidney disease". Masses are not described as such. Outpatient follow-up of liver lesion was recommended to patient and should be re-emphasized at discharge.     1/8  No nausea, chest pain, sob, fever, urinary or bowel complaint, new neurologic symptoms, new joint pain  1/9 CTA chest reviewed    CTA chest  1. No evidence of acute pulmonary thromboembolism.  2. Multi focal nodular opacities that are established throughout both lungs likely infectious or postinflammatory be attributed to multilobar pneumonia.  3. Systolic dysfunction with prominent thickening of the ventricular myocardium.  4. Large zone of low density replacement of the left hepatic lobe some areas. Masslike in appearance the focal area measuring 6.4 x 4.7 cm perhaps metastatic or secondary to primary hepatic lesion. Recommend " dedicated evaluation of the abdomen with tailored liver mass protocol evaluation..     Echo:  The left ventricle is normal in size with moderate concentric hypertrophy and normal systolic function.  Moderate left atrial enlargement.  The estimated ejection fraction is 65%.  Normal left ventricular diastolic function.  Mild mitral regurgitation.  Mild right atrial enlargement.  Normal right ventricular size with normal right ventricular systolic function.  Normal central venous pressure (3 mmHg).    1/10- CT with IV contrast to evaluate liver lesions and then HD  1/11- CT imaging neg for malignancy    Plan of Care:     COVID PNA/HCAP  --dose meds for CrCl < 10/HD    Esrd--dialysis via AVG  --continue dialysis per routine  --fluid restrict  --renal dose medication per routine  --continue outpt medication  --continue binders with meals    Hypertension  --continue amlodipine and coreg  --low salt diet  --uf as tolerated with hd    Anemia  --erythropoiesis stimulating agent with renal replacement therapy  --SPEP/IF, SFLC, B2M for anemia and thickened myocardium    Secondary hyperparathyroidism  --continue calcitriol and sensipar  --low phosphorus diet      Thank you for allowing us to participate in this patient's care. We will continue to follow.    Vital Signs:  Temp Readings from Last 3 Encounters:   01/11/23 98.1 °F (36.7 °C) (Oral)       Pulse Readings from Last 3 Encounters:   01/11/23 88       BP Readings from Last 3 Encounters:   01/11/23 133/82       Weight:  Wt Readings from Last 3 Encounters:   01/08/23 135.4 kg (298 lb 9.6 oz)   01/08/23 135.4 kg (298 lb 8.1 oz)     Medications:  No current facility-administered medications on file prior to encounter.     Current Outpatient Medications on File Prior to Encounter   Medication Sig Dispense Refill    amLODIPine (NORVASC) 5 MG tablet Take 5 mg by mouth 2 (two) times daily.      aspirin (ECOTRIN) 81 MG EC tablet Take 81 mg by mouth once daily.      calcitRIOL  "(ROCALTROL) 0.25 MCG Cap Take 0.25 mcg by mouth once daily.      carvediloL (COREG) 12.5 MG tablet Take 12.5 mg by mouth 2 (two) times daily with meals.      cetirizine (ZYRTEC) 10 MG tablet Take 10 mg by mouth once daily.      cinacalcet HCl (CINACALCET ORAL) Take 30 mg by mouth once daily.      omeprazole (PRILOSEC) 40 MG capsule Take 40 mg by mouth once daily.      rosuvastatin (CRESTOR) 10 MG tablet Take 10 mg by mouth once daily.      SAXagliptin (ONGLYZA) 2.5 mg Tab tablet Take 2.5 mg by mouth once daily.       Scheduled Meds:   albuterol sulfate  2.5 mg Nebulization Q8H    amLODIPine  5 mg Oral BID    ascorbic acid (vitamin C)  500 mg Oral BID    aspirin  81 mg Oral Daily    atorvastatin  40 mg Oral Daily    calcitRIOL  0.25 mcg Oral Daily    carvediloL  12.5 mg Oral BID WM    cefTRIAXone (ROCEPHIN) IVPB  1 g Intravenous Q24H    cetirizine  10 mg Oral Daily    cinacalcet  30 mg Oral Daily    dexAMETHasone  6 mg Oral Daily    enoxparin  100 mg Subcutaneous Daily    And    enoxparin  40 mg Subcutaneous Q24H    epoetin jose-epbx  50 Units/kg Intravenous Every Tues, Thurs, Sat    multivitamin  1 tablet Oral Daily    mupirocin   Nasal BID    pantoprazole  40 mg Oral Before breakfast    remdesivir infusion  100 mg Intravenous Daily     Continuous Infusions:  PRN Meds:.acetaminophen, dextromethorphan-guaiFENesin  mg/5 ml, dextrose 10%, dextrose 10%, glucagon (human recombinant), glucose, glucose, HYDROcodone-acetaminophen, iohexoL, melatonin, ondansetron, sodium chloride 0.9%      Review of Systems:  Review of Systems - All 14 systems reviewed and negative, except as noted in HPI    Physical Exam:    /82 (Patient Position: Lying)   Pulse 88   Temp 98.1 °F (36.7 °C) (Oral)   Resp 20   Ht 5' 6" (1.676 m)   Wt 135.4 kg (298 lb 9.6 oz)   SpO2 98%   BMI 48.20 kg/m²     Constitutional: nad, aao x 3  Heart: rrr, no m/r/g, wwp, no edema  Lungs: coarse, no w/r/r/c, no lb  Abdomen: s/nt/nd, " +BS    Results:  Lab Results   Component Value Date     01/10/2023     01/10/2023    K 3.9 01/10/2023    K 3.9 01/10/2023    CL 98 01/10/2023    CL 98 01/10/2023    CO2 27 01/10/2023    CO2 27 01/10/2023    BUN 51 (H) 01/10/2023    BUN 51 (H) 01/10/2023    CREATININE 11.5 (H) 01/10/2023    CREATININE 11.5 (H) 01/10/2023    CALCIUM 7.5 (L) 01/10/2023    CALCIUM 7.5 (L) 01/10/2023    ANIONGAP 14 01/10/2023    ANIONGAP 14 01/10/2023       Lab Results   Component Value Date    CALCIUM 7.5 (L) 01/10/2023    CALCIUM 7.5 (L) 01/10/2023    PHOS 3.4 01/10/2023    PHOS 3.4 01/10/2023       No results for input(s): WBC, RBC, HGB, HCT, PLT, MCV, MCH, MCHC in the last 24 hours.    Patient care was time spent personally by me on the following activities: > 35 min  Obtaining a history  Examination of patient.  Providing medical care at the patients bedside.  Developing a treatment plan with patient or surrogate and bedside caregivers  Ordering and reviewing laboratory studies, radiographic studies, pulse oximetry.  Ordering and performing treatments and interventions.  Evaluation of patient's response to treatment.  Discussions with consultants while on the unit and immediately available to the patient.  Re-evaluation of the patient's condition.  Documentation in the medical record.        I have personally reviewed pertinent radiological imaging and reports.    Katherine Thornton MD MPH  [facility]  [address] [place] [address]  [place] [address]  [phone] (p)  [phone] (f)

## 2023-01-11 NOTE — PROGRESS NOTES
Discharge instructions given to pt, pt verbalized understanding. PIV removed. Tele monitor removed. Pt leaving with personal belongings. Pt leaving in wheelchair with RN and pt's daughter.

## 2023-01-11 NOTE — PLAN OF CARE
01/11/23 0715   Patient Assessment/Suction   Level of Consciousness (AVPU) alert   Respiratory Effort Unlabored   Expansion/Accessory Muscles/Retractions no use of accessory muscles   All Lung Fields Breath Sounds clear;coarse;diminished   Rhythm/Pattern, Respiratory pattern regular;depth regular   Cough Frequency infrequent   Cough Type good;loose;nonproductive   PRE-TX-O2   Device (Oxygen Therapy) room air   SpO2 98 %   Pulse Oximetry Type Intermittent   $ Pulse Oximetry - Multiple Charge Pulse Oximetry - Multiple   Pulse 75   Resp 16   Aerosol Therapy   $ Aerosol Therapy Charges Aerosol Treatment   Daily Review of Necessity (SVN) completed   Respiratory Treatment Status (SVN) given   Treatment Route (SVN) mouthpiece   Patient Position (SVN) semi-Huertas's   Post Treatment Assessment (SVN) breath sounds unchanged   Signs of Intolerance (SVN) none   Breath Sounds Post-Respiratory Treatment   Throughout All Fields Post-Treatment All Fields   Throughout All Fields Post-Treatment aeration increased   Post-treatment Heart Rate (beats/min) 80   Post-treatment Resp Rate (breaths/min) 16   Education   $ Education Bronchodilator;15 min   Respiratory Evaluation   $ Care Plan Tech Time 15 min   $ Eval/Re-eval Charges Re-evaluation   Evaluation For Re-Eval 3 day

## 2023-01-12 LAB
BACTERIA BLD CULT: NORMAL
BACTERIA BLD CULT: NORMAL

## 2023-01-12 NOTE — HOSPITAL COURSE
62 year of female with history of Polycystic Kidney with ESRD (HD M,W,F), HTN, DM 2, HLD, Pericardial window (Pericarditis), COVID positive 5 days ago presented to ED complaining of worsening SOBE, and dry cough..  Patient was found to have COVID-19 infection started remdesivir and steroid because of the oxygenation.  Patient completed the treatment 3 days of remdesivir and subsequently better and on room air.  CTA negative for PE but steadily found to have liver lesion but patient aware of that in the past.  The size of the lesion is very large and proceeded liver protocol CT scan and appeared non malignant and discharged in stable condition.  Copy of the CT report was given to the patient and discharged stable condition.  Continued steroid at discharge to complete 10 days

## 2023-01-12 NOTE — DISCHARGE SUMMARY
"[facility] Medicine  Discharge Summary      Patient Name: Marian Dumont  MRN: [id number]  FORREST: [id number]  Patient Class: IP- Inpatient  Admission Date: 1/7/2023  Hospital Length of Stay: 4 days  Discharge Date and Time:  01/11/2023 6:07 PM  Attending Physician: No att. providers found   Discharging Provider: Brennon Donohue MD  Primary Care Provider: Jonathan Tao MD    Primary Care Team: Networked reference to record PCT     HPI:   62 year of female with history of Polycystic Kidney with ESRD (HD M,W,F), HTN, DM 2, HLD, Pericardial window (Pericarditis), COVID positive 5 days ago presented to ED complaining of worsening SOBE, and dry cough. She cannnot walk to bathroom without stopping to catch her breath. In ED her oxygen saturation was low 90's at rest and fell to mid to low 80's with ambulation. She feels "Miserable". Denied any pain. No orthopnea, or PND. Had RRT today (usually M,W,F--but was changed to T,T,S when tested positive for COVID because her RRT center was changed to accept her with COVID infection). She went to CTA Chest because of COVID Dx and SOB (increased risk of PE). No PE, but mass like lesion was seen in her liver. Discussed with patient and she stated that she had been told that before, and it was "From my cystic kidney disease". Masses are not described as such. Outpatient follow-up of liver lesion was recommended to patient and should be re-emphasized at discharge. The patient has moved from [place] to here, some records are available through [facility], but no detailed information is currently available.    In ED Labs reviewed: no leukocytosis with expected minimal normocytic anemia; mild hypokalemia and hypomagnesemia with end stage renal dysfunction (she will receive RRT in AM and electrolytes will be addressed then); BNP is normal with minimal (expected) elevation of troponin (ESRD); procalcitonin is minimally elevated. CXR reviewed: NAPD. EKG reviewed: sinus " "with no acute segments    CTA Chest: IMPRESSION:  "1. No evidence of acute pulmonary thromboembolism.  2. Multi focal nodular opacities that are established throughout both lungs likely infectious or postinflammatory be attributed to multilobar pneumonia.  3. Systolic dysfunction with prominent thickening of the ventricular myocardium.  4. Large zone of low density replacement of the left hepatic lobe some areas. Masslike in appearance the focal area measuring 6.4 x 4.7 cm perhaps metastatic or secondary to primary hepatic lesion. Recommend dedicated evaluation of the abdomen with tailored liver mass protocol evaluation."    Discussed with ED MD; Inpatient admission for COVID Pneumonia, Hypoxic Respiratory Failure; patient is 5 days out from Dx: will initiate COVID protocol; but also include ceftriaxone for possible bacterial pneumonia; continue home regimen for chronic maladies except holding po hypoglycemics with low dose insulin sliding scale; Nephrology consultation for RRT in AM (s/p CTA contrast); COVID isolation; AM labs for review; ECHO; supplemental oxygen per protocol.      * No surgery found *      Hospital Course:   62 year of female with history of Polycystic Kidney with ESRD (HD M,W,F), HTN, DM 2, HLD, Pericardial window (Pericarditis), COVID positive 5 days ago presented to ED complaining of worsening SOBE, and dry cough..  Patient was found to have COVID-19 infection started remdesivir and steroid because of the oxygenation.  Patient completed the treatment 3 days of remdesivir and subsequently better and on room air.  CTA negative for PE but steadily found to have liver lesion but patient aware of that in the past.  The size of the lesion is very large and proceeded liver protocol CT scan and appeared non malignant and discharged in stable condition.  Copy of the CT report was given to the patient and discharged stable condition.  Continued steroid at discharge to complete 10 days       Goals of " Care Treatment Preferences:  Code Status: Full Code      Consults:   Consults (From admission, onward)        Status Ordering Provider     Inpatient consult to Nephrology  Once        Provider:  Lorne Gómez MD    Completed BEN SHER     Inpatient consult to Nephrology  Once        Provider:  Katherine Thornton MD    Completed BEN SHER          No new Assessment & Plan notes have been filed under this hospital service since the last note was generated.  Service: Hospital Medicine    Final Active Diagnoses:    Diagnosis Date Noted POA    PRINCIPAL PROBLEM:  Pneumonia due to COVID-19 virus [U07.1, J12.82] 01/08/2023 Yes    Liver lesion [K76.9] 01/09/2023 Unknown    ESRD (end stage renal disease) [N18.6] 01/08/2023 Yes    Primary hypertension [I10] 01/08/2023 Yes    Type 2 diabetes mellitus without complication, without long-term current use of insulin [E11.9] 01/08/2023 Yes      Problems Resolved During this Admission:       Discharged Condition: good    Disposition: Home or Self Care    Follow Up:   Follow-up Information     Jonathan Tao MD Follow up in 1 month(s).    Specialty: Internal Medicine  Contact information:  [address] [place] [address]  [address] [place] [id number]  [phone]                       Patient Instructions:      Diet Cardiac     Activity as tolerated       Significant Diagnostic Studies: Labs:   BMP:   Recent Labs   Lab 01/10/23  0110   *  224*     139   K 3.9  3.9   CL 98  98   CO2 27  27   BUN 51*  51*   CREATININE 11.5*  11.5*   CALCIUM 7.5*  7.5*   MG 1.7    and CMP   Recent Labs   Lab 01/10/23  0110     139   K 3.9  3.9   CL 98  98   CO2 27  27   *  224*   BUN 51*  51*   CREATININE 11.5*  11.5*   CALCIUM 7.5*  7.5*   PROT 6.6   ALBUMIN 3.0*  3.0*   BILITOT 0.5   ALKPHOS 56   AST 17   ALT 11   ANIONGAP 14  14       Pending Diagnostic Studies:     None         Medications:  Reconciled Home Medications:      Medication List      START  taking these medications    cefUROXime 500 MG tablet  Commonly known as: CEFTIN  Take 1 tablet (500 mg total) by mouth every 12 (twelve) hours.     dexAMETHasone 6 MG tablet  Commonly known as: DECADRON  Take 1 tablet (6 mg total) by mouth once daily. for 7 days     ondansetron 4 MG tablet  Commonly known as: ZOFRAN  Take 1 tablet (4 mg total) by mouth 2 (two) times daily.        CONTINUE taking these medications    amLODIPine 5 MG tablet  Commonly known as: NORVASC  Take 5 mg by mouth 2 (two) times daily.     aspirin 81 MG EC tablet  Commonly known as: ECOTRIN  Take 81 mg by mouth once daily.     calcitRIOL 0.25 MCG Cap  Commonly known as: ROCALTROL  Take 0.25 mcg by mouth once daily.     carvediloL 12.5 MG tablet  Commonly known as: COREG  Take 12.5 mg by mouth 2 (two) times daily with meals.     cetirizine 10 MG tablet  Commonly known as: ZYRTEC  Take 10 mg by mouth once daily.     CINACALCET ORAL  Take 30 mg by mouth once daily.     omeprazole 40 MG capsule  Commonly known as: PRILOSEC  Take 40 mg by mouth once daily.     ONGLYZA 2.5 mg Tab tablet  Generic drug: SAXagliptin  Take 2.5 mg by mouth once daily.     rosuvastatin 10 MG tablet  Commonly known as: CRESTOR  Take 10 mg by mouth once daily.            Indwelling Lines/Drains at time of discharge:   Lines/Drains/Airways     Drain  Duration                Hemodialysis AV Fistula Left upper arm -- days              General: Patient resting comfortably in no acute distress. Appears as stated age. Calm  Eyes: EOM intact. No conjunctivae injection. No scleral icterus.  ENT: Hearing grossly intact. No discharge from ears. No nasal discharge.   CVS: RRR. No LE edema BL.  Lungs: CTA BL, no wheezing or crackles. Good breath sounds. No accessory muscle use. No acute respiratory distress  Neuro: non focal , Follows commands. Responds appropriatelyTime spent on the discharge of patient: 22 minutes         Brennon Donohue MD  Department of Hospital Medicine  [place]  [facility]

## 2023-03-10 ENCOUNTER — HOSPITAL ENCOUNTER (OUTPATIENT)
Facility: HOSPITAL | Age: 63
Discharge: HOME | End: 2023-03-12
Attending: EMERGENCY MEDICINE | Admitting: FAMILY MEDICINE
Payer: MEDICARE

## 2023-03-10 DIAGNOSIS — R07.9 CHEST PAIN: ICD-10-CM

## 2023-03-10 DIAGNOSIS — R06.02 SOB (SHORTNESS OF BREATH): Primary | ICD-10-CM

## 2023-03-10 DIAGNOSIS — E87.70 VOLUME OVERLOAD: ICD-10-CM

## 2023-03-10 DIAGNOSIS — R06.09 EXERTIONAL DYSPNEA: ICD-10-CM

## 2023-03-10 LAB
ALBUMIN SERPL BCP-MCNC: 3.4 G/DL (ref 3.5–5.2)
ALP SERPL-CCNC: 52 U/L (ref 55–135)
ALT SERPL W/O P-5'-P-CCNC: 10 U/L (ref 10–44)
ANION GAP SERPL CALC-SCNC: 13 MMOL/L (ref 8–16)
AST SERPL-CCNC: 13 U/L (ref 10–40)
BASOPHILS # BLD AUTO: 0.03 K/UL (ref 0–0.2)
BASOPHILS NFR BLD: 0.3 % (ref 0–1.9)
BILIRUB SERPL-MCNC: 0.5 MG/DL (ref 0.1–1)
BNP SERPL-MCNC: 42 PG/ML (ref 0–99)
BUN SERPL-MCNC: 30 MG/DL (ref 8–23)
CALCIUM SERPL-MCNC: 9.5 MG/DL (ref 8.7–10.5)
CHLORIDE SERPL-SCNC: 94 MMOL/L (ref 95–110)
CO2 SERPL-SCNC: 36 MMOL/L (ref 23–29)
CREAT SERPL-MCNC: 7.1 MG/DL (ref 0.5–1.4)
DIFFERENTIAL METHOD: ABNORMAL
EOSINOPHIL # BLD AUTO: 0.3 K/UL (ref 0–0.5)
EOSINOPHIL NFR BLD: 3.9 % (ref 0–8)
ERYTHROCYTE [DISTWIDTH] IN BLOOD BY AUTOMATED COUNT: 15.1 % (ref 11.5–14.5)
EST. GFR  (NO RACE VARIABLE): 5.9 ML/MIN/1.73 M^2
GLUCOSE SERPL-MCNC: 142 MG/DL (ref 70–110)
HCT VFR BLD AUTO: 31.9 % (ref 37–48.5)
HGB BLD-MCNC: 10.1 G/DL (ref 12–16)
IMM GRANULOCYTES # BLD AUTO: 0.03 K/UL (ref 0–0.04)
IMM GRANULOCYTES NFR BLD AUTO: 0.3 % (ref 0–0.5)
INR PPP: 1 (ref 0.8–1.2)
LACTATE SERPL-SCNC: 1.6 MMOL/L (ref 0.5–1.9)
LYMPHOCYTES # BLD AUTO: 1.5 K/UL (ref 1–4.8)
LYMPHOCYTES NFR BLD: 17.7 % (ref 18–48)
MAGNESIUM SERPL-MCNC: 1.6 MG/DL (ref 1.6–2.6)
MCH RBC QN AUTO: 30 PG (ref 27–31)
MCHC RBC AUTO-ENTMCNC: 31.7 G/DL (ref 32–36)
MCV RBC AUTO: 95 FL (ref 82–98)
MONOCYTES # BLD AUTO: 0.7 K/UL (ref 0.3–1)
MONOCYTES NFR BLD: 8.4 % (ref 4–15)
NEUTROPHILS # BLD AUTO: 6 K/UL (ref 1.8–7.7)
NEUTROPHILS NFR BLD: 69.4 % (ref 38–73)
NRBC BLD-RTO: 0 /100 WBC
PLATELET # BLD AUTO: 253 K/UL (ref 150–450)
PMV BLD AUTO: 10.9 FL (ref 9.2–12.9)
POTASSIUM SERPL-SCNC: 4.1 MMOL/L (ref 3.5–5.1)
PROT SERPL-MCNC: 7.2 G/DL (ref 6–8.4)
PROTHROMBIN TIME: 10.6 SEC (ref 9–12.5)
RBC # BLD AUTO: 3.37 M/UL (ref 4–5.4)
SODIUM SERPL-SCNC: 143 MMOL/L (ref 136–145)
TROPONIN I SERPL HS-MCNC: 12.6 PG/ML (ref 0–14.9)
TSH SERPL DL<=0.005 MIU/L-ACNC: 3.9 UIU/ML (ref 0.34–5.6)
WBC # BLD AUTO: 8.64 K/UL (ref 3.9–12.7)

## 2023-03-10 PROCEDURE — 83605 ASSAY OF LACTIC ACID: CPT | Performed by: EMERGENCY MEDICINE

## 2023-03-10 PROCEDURE — 85025 COMPLETE CBC W/AUTO DIFF WBC: CPT | Performed by: EMERGENCY MEDICINE

## 2023-03-10 PROCEDURE — 94761 N-INVAS EAR/PLS OXIMETRY MLT: CPT

## 2023-03-10 PROCEDURE — G0378 HOSPITAL OBSERVATION PER HR: HCPCS

## 2023-03-10 PROCEDURE — 93010 ELECTROCARDIOGRAM REPORT: CPT | Mod: ,,, | Performed by: INTERNAL MEDICINE

## 2023-03-10 PROCEDURE — 84443 ASSAY THYROID STIM HORMONE: CPT | Performed by: EMERGENCY MEDICINE

## 2023-03-10 PROCEDURE — 85610 PROTHROMBIN TIME: CPT | Performed by: EMERGENCY MEDICINE

## 2023-03-10 PROCEDURE — 80053 COMPREHEN METABOLIC PANEL: CPT | Performed by: EMERGENCY MEDICINE

## 2023-03-10 PROCEDURE — 83880 ASSAY OF NATRIURETIC PEPTIDE: CPT | Performed by: EMERGENCY MEDICINE

## 2023-03-10 PROCEDURE — 94660 CPAP INITIATION&MGMT: CPT

## 2023-03-10 PROCEDURE — 90935 HEMODIALYSIS ONE EVALUATION: CPT

## 2023-03-10 PROCEDURE — 25000003 PHARM REV CODE 250: Performed by: FAMILY MEDICINE

## 2023-03-10 PROCEDURE — 25500020 PHARM REV CODE 255: Performed by: EMERGENCY MEDICINE

## 2023-03-10 PROCEDURE — 99900035 HC TECH TIME PER 15 MIN (STAT)

## 2023-03-10 PROCEDURE — 93005 ELECTROCARDIOGRAM TRACING: CPT | Performed by: INTERNAL MEDICINE

## 2023-03-10 PROCEDURE — 83735 ASSAY OF MAGNESIUM: CPT | Performed by: EMERGENCY MEDICINE

## 2023-03-10 PROCEDURE — 84484 ASSAY OF TROPONIN QUANT: CPT | Performed by: EMERGENCY MEDICINE

## 2023-03-10 PROCEDURE — 87040 BLOOD CULTURE FOR BACTERIA: CPT | Performed by: EMERGENCY MEDICINE

## 2023-03-10 PROCEDURE — 93010 EKG 12-LEAD: ICD-10-PCS | Mod: ,,, | Performed by: INTERNAL MEDICINE

## 2023-03-10 PROCEDURE — 27000221 HC OXYGEN, UP TO 24 HOURS

## 2023-03-10 PROCEDURE — 99285 EMERGENCY DEPT VISIT HI MDM: CPT | Mod: 25

## 2023-03-10 RX ORDER — NALOXONE HCL 0.4 MG/ML
0.02 VIAL (ML) INJECTION
Status: DISCONTINUED | OUTPATIENT
Start: 2023-03-10 | End: 2023-03-12 | Stop reason: HOSPADM

## 2023-03-10 RX ORDER — CINACALCET 30 MG/1
30 TABLET, FILM COATED ORAL DAILY
COMMUNITY
Start: 2023-01-18

## 2023-03-10 RX ORDER — IODIXANOL 320 MG/ML
100 INJECTION, SOLUTION INTRAVASCULAR
Status: COMPLETED | OUTPATIENT
Start: 2023-03-10 | End: 2023-03-10

## 2023-03-10 RX ORDER — MUPIROCIN 20 MG/G
OINTMENT TOPICAL 2 TIMES DAILY
Status: DISCONTINUED | OUTPATIENT
Start: 2023-03-10 | End: 2023-03-12 | Stop reason: HOSPADM

## 2023-03-10 RX ORDER — CINACALCET 30 MG/1
30 TABLET, FILM COATED ORAL DAILY
Status: DISCONTINUED | OUTPATIENT
Start: 2023-03-11 | End: 2023-03-12 | Stop reason: HOSPADM

## 2023-03-10 RX ORDER — ACETAMINOPHEN 325 MG/1
650 TABLET ORAL EVERY 8 HOURS PRN
Status: DISCONTINUED | OUTPATIENT
Start: 2023-03-10 | End: 2023-03-12 | Stop reason: HOSPADM

## 2023-03-10 RX ORDER — CARVEDILOL 12.5 MG/1
12.5 TABLET ORAL
COMMUNITY
Start: 2022-10-01 | End: 2023-03-10

## 2023-03-10 RX ORDER — TALC
6 POWDER (GRAM) TOPICAL NIGHTLY PRN
Status: DISCONTINUED | OUTPATIENT
Start: 2023-03-10 | End: 2023-03-12 | Stop reason: HOSPADM

## 2023-03-10 RX ORDER — CALCITRIOL 0.25 UG/1
0.25 CAPSULE ORAL DAILY
Status: DISCONTINUED | OUTPATIENT
Start: 2023-03-11 | End: 2023-03-12 | Stop reason: HOSPADM

## 2023-03-10 RX ORDER — POLYETHYLENE GLYCOL 3350 17 G/17G
17 POWDER, FOR SOLUTION ORAL 2 TIMES DAILY PRN
Status: DISCONTINUED | OUTPATIENT
Start: 2023-03-10 | End: 2023-03-12 | Stop reason: HOSPADM

## 2023-03-10 RX ORDER — LIDOCAINE AND PRILOCAINE 25; 25 MG/G; MG/G
1 CREAM TOPICAL
COMMUNITY
Start: 2023-01-26

## 2023-03-10 RX ORDER — ATORVASTATIN CALCIUM 40 MG/1
40 TABLET, FILM COATED ORAL DAILY
Status: DISCONTINUED | OUTPATIENT
Start: 2023-03-11 | End: 2023-03-12 | Stop reason: HOSPADM

## 2023-03-10 RX ORDER — AMOXICILLIN AND CLAVULANATE POTASSIUM 500; 125 MG/1; MG/1
1 TABLET, FILM COATED ORAL 2 TIMES DAILY
Status: DISCONTINUED | OUTPATIENT
Start: 2023-03-10 | End: 2023-03-11

## 2023-03-10 RX ORDER — HEPARIN SODIUM 5000 [USP'U]/ML
5000 INJECTION, SOLUTION INTRAVENOUS; SUBCUTANEOUS
Status: CANCELLED | OUTPATIENT
Start: 2023-03-10

## 2023-03-10 RX ORDER — AMOXICILLIN AND CLAVULANATE POTASSIUM 500; 125 MG/1; MG/1
1 TABLET, FILM COATED ORAL 2 TIMES DAILY
COMMUNITY
Start: 2023-03-06 | End: 2023-06-01

## 2023-03-10 RX ORDER — IBUPROFEN 200 MG
16 TABLET ORAL
Status: DISCONTINUED | OUTPATIENT
Start: 2023-03-10 | End: 2023-03-12 | Stop reason: HOSPADM

## 2023-03-10 RX ORDER — HYDROCODONE BITARTRATE AND ACETAMINOPHEN 5; 325 MG/1; MG/1
1 TABLET ORAL EVERY 4 HOURS PRN
Status: DISCONTINUED | OUTPATIENT
Start: 2023-03-10 | End: 2023-03-12 | Stop reason: HOSPADM

## 2023-03-10 RX ORDER — MORPHINE SULFATE 4 MG/ML
4 INJECTION, SOLUTION INTRAMUSCULAR; INTRAVENOUS EVERY 4 HOURS PRN
Status: DISCONTINUED | OUTPATIENT
Start: 2023-03-10 | End: 2023-03-12 | Stop reason: HOSPADM

## 2023-03-10 RX ORDER — PANTOPRAZOLE SODIUM 40 MG/1
40 TABLET, DELAYED RELEASE ORAL DAILY
Status: DISCONTINUED | OUTPATIENT
Start: 2023-03-11 | End: 2023-03-12 | Stop reason: HOSPADM

## 2023-03-10 RX ORDER — IPRATROPIUM BROMIDE 42 UG/1
2 SPRAY, METERED NASAL 2 TIMES DAILY
COMMUNITY
Start: 2023-01-16 | End: 2023-08-31

## 2023-03-10 RX ORDER — ASPIRIN 81 MG/1
81 TABLET ORAL DAILY
Status: DISCONTINUED | OUTPATIENT
Start: 2023-03-11 | End: 2023-03-12 | Stop reason: HOSPADM

## 2023-03-10 RX ORDER — FLUTICASONE PROPIONATE 50 MCG
1 SPRAY, SUSPENSION (ML) NASAL 2 TIMES DAILY
Status: DISCONTINUED | OUTPATIENT
Start: 2023-03-10 | End: 2023-03-12 | Stop reason: HOSPADM

## 2023-03-10 RX ORDER — SODIUM CHLORIDE 0.9 % (FLUSH) 0.9 %
10 SYRINGE (ML) INJECTION
Status: DISCONTINUED | OUTPATIENT
Start: 2023-03-10 | End: 2023-03-12 | Stop reason: HOSPADM

## 2023-03-10 RX ORDER — ONDANSETRON 2 MG/ML
4 INJECTION INTRAMUSCULAR; INTRAVENOUS EVERY 6 HOURS PRN
Status: DISCONTINUED | OUTPATIENT
Start: 2023-03-10 | End: 2023-03-12 | Stop reason: HOSPADM

## 2023-03-10 RX ORDER — ASPIRIN 81 MG/1
1 TABLET ORAL EVERY MORNING
COMMUNITY
End: 2023-03-10

## 2023-03-10 RX ORDER — AMLODIPINE BESYLATE 5 MG/1
5 TABLET ORAL 2 TIMES DAILY
Status: DISCONTINUED | OUTPATIENT
Start: 2023-03-10 | End: 2023-03-10

## 2023-03-10 RX ORDER — SIMETHICONE 80 MG
1 TABLET,CHEWABLE ORAL 4 TIMES DAILY PRN
Status: DISCONTINUED | OUTPATIENT
Start: 2023-03-10 | End: 2023-03-12 | Stop reason: HOSPADM

## 2023-03-10 RX ORDER — NEOMYCIN SULFATE, POLYMYXIN B SULFATE, HYDROCORTISONE 3.5; 10000; 1 MG/ML; [USP'U]/ML; MG/ML
3 SOLUTION/ DROPS AURICULAR (OTIC) SEE ADMIN INSTRUCTIONS
COMMUNITY
Start: 2023-02-13 | End: 2023-03-10

## 2023-03-10 RX ORDER — AMOXICILLIN AND CLAVULANATE POTASSIUM 500; 125 MG/1; MG/1
TABLET, FILM COATED ORAL
COMMUNITY
Start: 2023-03-06 | End: 2023-03-10

## 2023-03-10 RX ORDER — IPRATROPIUM BROMIDE 42 UG/1
2 SPRAY, METERED NASAL 2 TIMES DAILY
Status: DISCONTINUED | OUTPATIENT
Start: 2023-03-10 | End: 2023-03-12 | Stop reason: HOSPADM

## 2023-03-10 RX ORDER — CARVEDILOL 12.5 MG/1
12.5 TABLET ORAL 2 TIMES DAILY WITH MEALS
Status: DISCONTINUED | OUTPATIENT
Start: 2023-03-10 | End: 2023-03-12 | Stop reason: HOSPADM

## 2023-03-10 RX ORDER — IBUPROFEN 200 MG
24 TABLET ORAL
Status: DISCONTINUED | OUTPATIENT
Start: 2023-03-10 | End: 2023-03-12 | Stop reason: HOSPADM

## 2023-03-10 RX ORDER — SODIUM CHLORIDE 9 MG/ML
INJECTION, SOLUTION INTRAVENOUS ONCE
Status: CANCELLED | OUTPATIENT
Start: 2023-03-10 | End: 2023-03-10

## 2023-03-10 RX ORDER — SODIUM CHLORIDE 9 MG/ML
INJECTION, SOLUTION INTRAVENOUS
Status: CANCELLED | OUTPATIENT
Start: 2023-03-10

## 2023-03-10 RX ORDER — IPRATROPIUM BROMIDE AND ALBUTEROL SULFATE 2.5; .5 MG/3ML; MG/3ML
3 SOLUTION RESPIRATORY (INHALATION) EVERY 4 HOURS PRN
Status: DISCONTINUED | OUTPATIENT
Start: 2023-03-10 | End: 2023-03-12 | Stop reason: HOSPADM

## 2023-03-10 RX ORDER — GLUCAGON 1 MG
1 KIT INJECTION
Status: DISCONTINUED | OUTPATIENT
Start: 2023-03-10 | End: 2023-03-12 | Stop reason: HOSPADM

## 2023-03-10 RX ORDER — FLUTICASONE PROPIONATE 50 MCG
1 SPRAY, SUSPENSION (ML) NASAL 2 TIMES DAILY
COMMUNITY
Start: 2022-11-21 | End: 2023-08-31

## 2023-03-10 RX ORDER — AMLODIPINE BESYLATE 5 MG/1
5 TABLET ORAL DAILY
Status: DISCONTINUED | OUTPATIENT
Start: 2023-03-11 | End: 2023-03-12 | Stop reason: HOSPADM

## 2023-03-10 RX ORDER — HEPARIN SODIUM 5000 [USP'U]/ML
5000 INJECTION, SOLUTION INTRAVENOUS; SUBCUTANEOUS EVERY 8 HOURS
Status: DISCONTINUED | OUTPATIENT
Start: 2023-03-10 | End: 2023-03-12 | Stop reason: HOSPADM

## 2023-03-10 RX ORDER — INSULIN ASPART 100 [IU]/ML
1-10 INJECTION, SOLUTION INTRAVENOUS; SUBCUTANEOUS
Status: DISCONTINUED | OUTPATIENT
Start: 2023-03-10 | End: 2023-03-12 | Stop reason: HOSPADM

## 2023-03-10 RX ORDER — AMOXICILLIN 250 MG
1 CAPSULE ORAL 2 TIMES DAILY PRN
Status: DISCONTINUED | OUTPATIENT
Start: 2023-03-10 | End: 2023-03-12 | Stop reason: HOSPADM

## 2023-03-10 RX ORDER — IPRATROPIUM BROMIDE 42 UG/1
SPRAY, METERED NASAL
COMMUNITY
Start: 2023-01-16 | End: 2023-03-10

## 2023-03-10 RX ORDER — CETIRIZINE HYDROCHLORIDE 10 MG/1
10 TABLET ORAL DAILY
Status: DISCONTINUED | OUTPATIENT
Start: 2023-03-11 | End: 2023-03-12 | Stop reason: HOSPADM

## 2023-03-10 RX ADMIN — MUPIROCIN 1 G: 20 OINTMENT TOPICAL at 09:03

## 2023-03-10 RX ADMIN — IODIXANOL 100 ML: 320 INJECTION, SOLUTION INTRAVASCULAR at 05:03

## 2023-03-10 RX ADMIN — CARVEDILOL 12.5 MG: 12.5 TABLET, FILM COATED ORAL at 09:03

## 2023-03-10 RX ADMIN — AMOXICILLIN AND CLAVULANATE POTASSIUM 500 MG: 500; 125 TABLET, FILM COATED ORAL at 09:03

## 2023-03-10 NOTE — ED PROVIDER NOTES
Encounter Date: 3/10/2023       History     Chief Complaint   Patient presents with    Shortness of Breath     Pt is here for c/o sob for the last three days, she said the last time she felt like this was when she had pna. Pt is hd pt that went today she had 2 liters removed.      63-year-old female presents complaining of shortness of breath patient reports shortness of breath worsening for the past 3 days patient reports that she has a history of fluid overload in the past, patient reports that she went to dialysis today and had approximately 2 L removed patient reports that she is still unable to lie flat and that she has not been able to sleep in several days given that she can not lie down without becoming short of breath.  Currently patient is sitting up in the bed and is unable to lie recumbent.    Review of patient's allergies indicates:   Allergen Reactions    Ace inhibitors      Past Medical History:   Diagnosis Date    COVID-19     Diabetes mellitus     Dialysis patient     Hypertension     Mixed hyperlipidemia     Obese body habitus      No past surgical history on file.  No family history on file.     Review of Systems   Constitutional:  Negative for fever.   HENT:  Negative for congestion, rhinorrhea, sore throat and trouble swallowing.    Eyes:  Negative for visual disturbance.   Respiratory:  Positive for shortness of breath. Negative for cough, chest tightness and wheezing.    Cardiovascular:  Negative for chest pain, palpitations and leg swelling.   Gastrointestinal:  Negative for abdominal distention, abdominal pain, constipation, diarrhea, nausea and vomiting.   Genitourinary:  Negative for difficulty urinating, dysuria, flank pain and frequency.   Musculoskeletal:  Negative for arthralgias, back pain, joint swelling and neck pain.   Skin:  Negative for color change and rash.   Neurological:  Negative for dizziness, syncope, speech difficulty, weakness, numbness and headaches.   All other systems  reviewed and are negative.    Physical Exam     Initial Vitals [03/10/23 1352]   BP Pulse Resp Temp SpO2   (!) 193/73 92 18 98.4 °F (36.9 °C) 98 %      MAP       --         Physical Exam    Nursing note and vitals reviewed.  Constitutional: She appears well-developed and well-nourished. She is not diaphoretic. No distress.   HENT:   Head: Normocephalic and atraumatic.   Right Ear: External ear normal.   Left Ear: External ear normal.   Nose: Nose normal.   Mouth/Throat: Oropharynx is clear and moist. No oropharyngeal exudate.   Eyes: Conjunctivae and EOM are normal. Pupils are equal, round, and reactive to light. Right eye exhibits no discharge. Left eye exhibits no discharge. No scleral icterus.   Neck: Neck supple. No thyromegaly present. No tracheal deviation present. No JVD present.   Normal range of motion.  Cardiovascular:  Normal rate, regular rhythm, normal heart sounds and intact distal pulses.     Exam reveals no gallop and no friction rub.       No murmur heard.  Pulmonary/Chest: Breath sounds normal. No stridor. No respiratory distress. She has no wheezes. She has no rhonchi. She has no rales. She exhibits no tenderness.   Abdominal: Abdomen is soft. Bowel sounds are normal. She exhibits no distension and no mass. There is no abdominal tenderness. There is no rebound and no guarding.   Musculoskeletal:         General: No tenderness or edema. Normal range of motion.      Cervical back: Normal range of motion and neck supple.     Lymphadenopathy:     She has no cervical adenopathy.   Neurological: She is alert and oriented to person, place, and time. She has normal strength. No cranial nerve deficit or sensory deficit.   Skin: Skin is warm and dry. No rash and no abscess noted. No erythema. No pallor.       ED Course   Procedures  Labs Reviewed   CBC W/ AUTO DIFFERENTIAL - Abnormal; Notable for the following components:       Result Value    RBC 3.37 (*)     Hemoglobin 10.1 (*)     Hematocrit 31.9 (*)      MCHC 31.7 (*)     RDW 15.1 (*)     Lymph % 17.7 (*)     All other components within normal limits   COMPREHENSIVE METABOLIC PANEL - Abnormal; Notable for the following components:    Chloride 94 (*)     CO2 36 (*)     Glucose 142 (*)     BUN 30 (*)     Creatinine 7.1 (*)     Albumin 3.4 (*)     Alkaline Phosphatase 52 (*)     eGFR 5.9 (*)     All other components within normal limits   CULTURE, BLOOD   CULTURE, BLOOD   B-TYPE NATRIURETIC PEPTIDE   MAGNESIUM   PROTIME-INR   TSH   TROPONIN I HIGH SENSITIVITY   LACTIC ACID, PLASMA   HEPATITIS B SURFACE ANTIGEN   HEPATITIS B SURFACE ANTIBODY   HEPATITIS B CORE ANTIBODY, TOTAL   HEMOGLOBIN A1C        ECG Results              EKG 12-lead (In process)  Result time 03/10/23 14:29:10      In process by Interface, Lab In [facility] (03/10/23 14:29:10)                   Narrative:    Test Reason : R06.02,    Vent. Rate : 089 BPM     Atrial Rate : 089 BPM     P-R Int : 144 ms          QRS Dur : 080 ms      QT Int : 410 ms       P-R-T Axes : 029 -24 011 degrees     QTc Int : 498 ms    Normal sinus rhythm  Minimal voltage criteria for LVH, may be normal variant ( R in aVL )  Anterolateral infarct ,age undetermined  Abnormal ECG  When compared with ECG of 07-JAN-2023 19:16,  Anterolateral infarct is now Present  Nonspecific T wave abnormality now evident in Inferior leads    Referred By:             Confirmed By:                                   Imaging Results              CTA Chest Non-Coronary (PE Studies) (Final result)  Result time 03/10/23 18:30:30      Final result by Edmund Shanks MD (03/10/23 18:30:30)                   Narrative:      EXAM: CTA CHEST NON CORONARY (PE STUDIES)    HISTORY: Pulmonary embolism (PE) suspected, high prob    COMPARISON: CTA of the chest dated 1/7/2023    TECHNIQUE: Spiral CT images were obtained through the chest during rapid IV injection of contrast and were reconstructed in 1 mm thick axial slices. Multiple coronal and sagittal MIP  reconstructions were obtained.    This exam was performed according to our departmental dose-optimization program, which includes automated exposure control, adjustment of the mA and/or kV according to patient size and/or use of iterative reconstruction technique.    FINDINGS: The main pulmonary arteries and their lobar and segmental branches are fairly well opacified, and demonstrate no filling defects. There is no CT evidence of acute pulmonary thromboembolism. Note that small distal emboli cannot be excluded by this study. The thoracic aorta was also well-opacified and appears within normal limits. The heart is moderately enlarged. There is no mediastinal or hilar or axillary lymphadenopathy. Lung window images demonstrate no parenchymal infiltrates or discrete lung masses. There are no pleural effusions. Images through the upper abdomen demonstrate innumerable cysts distributed throughout the liver that were much better evaluated on the recent previous CT scan of the abdomen dated 1/10/2023.    IMPRESSION:   Negative CTA of the chest with PE protocol. There is no CT evidence of pulmonary embolism or other acute process. There is moderate cardiomegaly.    Electronically signed by:  Nakul Shanks MD  3/10/2023 6:30 PM CST Workstation: [id number]                                     X-Ray Chest AP Portable (Final result)  Result time 03/10/23 14:43:15      Final result by Varun Marinelli MD (03/10/23 14:43:15)                   Narrative:    Reason: Shortness of breath COVID, SOB    FINDINGS:  Portable chest at 1421 compared with 1/7/2023 shows enlarged cardiac silhouette size with normal mediastinal contours. Left axillary vascular stent unchanged.    Lungs are clear. Pulmonary vasculature is normal. No acute osseous abnormality.    IMPRESSION:  Unchanged cardiomegaly without acute cardiopulmonary abnormality.    Electronically signed by:  Varun Marinelli MD  3/10/2023 2:43 PM CST Workstation: [id number]                                      Medications   mupirocin 2 % ointment (has no administration in time range)   atorvastatin tablet 40 mg (has no administration in time range)   pantoprazole EC tablet 40 mg (has no administration in time range)   ipratropium 42 mcg (0.06 %) nasal spray 2 spray (has no administration in time range)   fluticasone propionate 50 mcg/actuation nasal spray 50 mcg (has no administration in time range)   cinacalcet tablet 30 mg (has no administration in time range)   cetirizine tablet 10 mg (has no administration in time range)   carvediloL tablet 12.5 mg (has no administration in time range)   aspirin EC tablet 81 mg (has no administration in time range)   calcitRIOL capsule 0.25 mcg (has no administration in time range)   amLODIPine tablet 5 mg (has no administration in time range)   sodium chloride 0.9% flush 10 mL (has no administration in time range)   albuterol-ipratropium 2.5 mg-0.5 mg/3 mL nebulizer solution 3 mL (has no administration in time range)   melatonin tablet 6 mg (has no administration in time range)   ondansetron injection 4 mg (has no administration in time range)   polyethylene glycol packet 17 g (has no administration in time range)   senna-docusate 8.6-50 mg per tablet 1 tablet (has no administration in time range)   acetaminophen tablet 650 mg (has no administration in time range)   simethicone chewable tablet 80 mg (has no administration in time range)   HYDROcodone-acetaminophen 5-325 mg per tablet 1 tablet (has no administration in time range)   morphine injection 4 mg (has no administration in time range)   naloxone 0.4 mg/mL injection 0.02 mg (has no administration in time range)   glucose chewable tablet 16 g (has no administration in time range)   glucose chewable tablet 24 g (has no administration in time range)   glucagon (human recombinant) injection 1 mg (has no administration in time range)   dextrose 10% bolus 125 mL 125 mL (has no administration in time range)    dextrose 10% bolus 250 mL 250 mL (has no administration in time range)   heparin (porcine) injection 5,000 Units (has no administration in time range)   insulin aspart U-100 pen 1-10 Units (has no administration in time range)   iodixanoL (VISIPAQUE 320) injection 100 mL (100 mLs Intravenous Given 3/10/23 1726)     Medical Decision Making:   History:   Old Medical Records: I decided to obtain old medical records.  Initial Assessment:   Emergent evaluation of a 63-year-old female presenting with shortness of breath differential diagnosis includes fluid overload, infection, electrolyte abnormality, dialysis complication          Attending Attestation:             Attending ED Notes:   Patient consulted to Internal Medicine and Nephrology, patient has no sign of PE    MDM    MDM    Patient presents for emergent evaluation of acute complaint that poses a possible threat to life and/or bodily function.    I may have ordered labs and personally reviewed them. If applicable, Labs significant for abnormalities noted above.    I may have ordered X-rays and personally reviewed them and reviewed the radiologist interpretation.  If applicable, Xray significant for findings noted above.    I may have ordered EKG and personally reviewed it.  If applicable, EKG significant for findings noted above.    I may have ordered CT scan and personally reviewed it and reviewed the radiologist interpretation.  If applicable, CT significant for findings noted above.      Admission MDM  I discussed the patient presentation labs, ekg, X-rays, CT findings (if applicable) with the consultant(s)   Patient was managed in the ED with IV labs, meds, fluids (if applicable).    The response to treatment was noted with improved stabilization.    Patient required emergent consultation to Hospitalist for admission.    A dictation software program was used for this note.  Please expect some simple typographical  errors in this note.                       Clinical Impression:   Final diagnoses:  [R06.02] SOB (shortness of breath) (Primary)  [E87.70] Volume overload        ED Disposition Condition    Observation                 Stanislav Reynolds MD  03/10/23 1935

## 2023-03-10 NOTE — CONSULTS
INPATIENT NEPHROLOGY CONSULT   [facility] NEPHROLOGY    Marian Dumont  03/10/2023    Reason for consultation:    esrd    Chief Complaint:   Chief Complaint   Patient presents with    Shortness of Breath     Pt is here for c/o sob for the last three days, she said the last time she felt like this was when she had pna. Pt is hd pt that went today she had 2 liters removed.           History of Present Illness:    Per ER  63-year-old female presents complaining of shortness of breath patient reports shortness of breath worsening for the past 3 days patient reports that she has a history of fluid overload in the past, patient reports that she went to dialysis today and had approximately 2 L removed patient reports that she is still unable to lie flat and that she has not been able to sleep in several days given that she can not lie down without becoming short of breath.  Currently patient is sitting up in the bed and is unable to lie recumbent.    3/10  c/o orthopnea.  No nausea, chest pain,  fever, urinary or bowel complaint, new neurologic symptoms, new joint pain      Plan of Care:       Assessment:    esrd  --continue dialysis per routine  --fluid restrict  --renal dose medication per routine  --continue outpt medication  --continue binders with meals    Anemia  --erythropoiesis stimulating agent with renal replacement therapy    Hyperphosphatemia  --renal diet  --continue binders    Hypertension  --uf with hd  --fluid restrict  --low salt diet  --continue home medication    SOB  --cxr unrevealing  --bnp normal  --will do isolated uf tonight   --consider cta chest         Thank you for allowing us to participate in this patient's care. We will continue to follow.    Vital Signs:  Temp Readings from Last 3 Encounters:   03/10/23 98.4 °F (36.9 °C) (Oral)   01/11/23 98.1 °F (36.7 °C) (Oral)       Pulse Readings from Last 3 Encounters:   03/10/23 86   01/11/23 88       BP Readings from Last 3 Encounters:   03/10/23  138/63   01/11/23 133/82       Weight:  Wt Readings from Last 3 Encounters:   03/10/23 136.1 kg (300 lb)   01/08/23 135.4 kg (298 lb 9.6 oz)   01/08/23 135.4 kg (298 lb 8.1 oz)       Past Medical & Surgical History:  Past Medical History:   Diagnosis Date    COVID-19     Diabetes mellitus     Dialysis patient     Hypertension     Mixed hyperlipidemia     Obese body habitus        No past surgical history on file.    Past Social History:  Social History     Socioeconomic History    Marital status:        Medications:  No current facility-administered medications on file prior to encounter.     Current Outpatient Medications on File Prior to Encounter   Medication Sig Dispense Refill    amLODIPine (NORVASC) 5 MG tablet Take 5 mg by mouth 2 (two) times daily.      amoxicillin-clavulanate 500-125mg (AUGMENTIN) 500-125 mg Tab Take 1 tablet by mouth 2 (two) times daily.      aspirin (ECOTRIN) 81 MG EC tablet Take 81 mg by mouth once daily.      carvediloL (COREG) 12.5 MG tablet Take 12.5 mg by mouth 2 (two) times daily with meals.      cetirizine (ZYRTEC) 10 MG tablet Take 10 mg by mouth once daily.      cinacalcet (SENSIPAR) 30 MG Tab Take 30 mg by mouth Daily.      fluticasone propionate (FLONASE) 50 mcg/actuation nasal spray 1 spray by Each Nostril route 2 (two) times a day.      ipratropium (ATROVENT) 42 mcg (0.06 %) nasal spray 2 sprays by Each Nostril route 2 (two) times daily.      omeprazole (PRILOSEC) 40 MG capsule Take 40 mg by mouth once daily.      rosuvastatin (CRESTOR) 10 MG tablet Take 10 mg by mouth once daily.      SAXagliptin (ONGLYZA) 2.5 mg Tab tablet Take 2.5 mg by mouth once daily.      amoxicillin-clavulanate 500-125mg (AUGMENTIN) 500-125 mg Tab Take 1 tablet twice daily for the next 10 days.  If you are taking it on a dialysis day, take the dose after dialysis.      aspirin (ECOTRIN) 81 MG EC tablet Take 1 tablet by mouth every morning.      calcitRIOL (ROCALTROL) 0.25 MCG Cap Take 0.25 mcg  "by mouth once daily.      carvediloL (COREG) 12.5 MG tablet Take 12.5 mg by mouth.      cefUROXime (CEFTIN) 500 MG tablet Take 1 tablet (500 mg total) by mouth every 12 (twelve) hours. 10 tablet 0    cinacalcet HCl (CINACALCET ORAL) Take 30 mg by mouth once daily.      ipratropium (ATROVENT) 42 mcg (0.06 %) nasal spray USE 2 SPRAYS IN EACH NOSTRIL IN THE MORNING AND 2 SPRAYS BEFORE BEDTIME      LIDOcaine-prilocaine (EMLA) cream Apply 1 each topically as needed.      neomycin-polymyxin-hydrocortisone (CORTISPORIN) otic solution Place 3 drops into both ears As instructed.      ondansetron (ZOFRAN) 4 MG tablet Take 1 tablet (4 mg total) by mouth 2 (two) times daily. 34 tablet 0     Scheduled Meds:   mupirocin   Nasal BID     Continuous Infusions:  PRN Meds:.    Allergies:  Ace inhibitors    Past Family History:  Reviewed; refer to Hospitalist Admission Note    Review of Systems:  Review of Systems - All 14 systems reviewed and negative, except as noted in HPI    Physical Exam:    /63   Pulse 86   Temp 98.4 °F (36.9 °C) (Oral)   Resp (!) 24   Ht 5' 6" (1.676 m)   Wt 136.1 kg (300 lb)   SpO2 96%   Breastfeeding No   BMI 48.42 kg/m²     General Appearance:    Alert, cooperative, no distress, appears stated age   Head:    Normocephalic, without obvious abnormality, atraumatic   Eyes:    PER, conjunctiva/corneas clear, EOM's intact in both eyes        Throat:   Lips, mucosa, and tongue normal; teeth and gums normal   Back:     Symmetric, no curvature, ROM normal, no CVA tenderness   Lungs:     Clear to auscultation bilaterally, respirations unlabored   Chest wall:    No tenderness or deformity   Heart:    Regular rate and rhythm, S1 and S2 normal, no murmur, rub   or gallop   Abdomen:     Soft, non-tender, bowel sounds active all four quadrants,     no masses, no organomegaly   Extremities:   Extremities normal, atraumatic, no cyanosis or edema   Pulses:   2+ and symmetric all extremities   MSK:   No joint or " muscle swelling, tenderness or deformity   Skin:   Skin color, texture, turgor normal, no rashes or lesions   Neurologic:   CNII-XII intact, normal strength and sensation       Throughout.  No flap     Results:  Lab Results   Component Value Date     03/10/2023    K 4.1 03/10/2023    CL 94 (L) 03/10/2023    CO2 36 (H) 03/10/2023    BUN 30 (H) 03/10/2023    CREATININE 7.1 (H) 03/10/2023    CALCIUM 9.5 03/10/2023    ANIONGAP 13 03/10/2023       Lab Results   Component Value Date    CALCIUM 9.5 03/10/2023    PHOS 3.4 01/10/2023    PHOS 3.4 01/10/2023       Recent Labs   Lab 03/10/23  1509   WBC 8.64   RBC 3.37*   HGB 10.1*   HCT 31.9*      MCV 95   MCH 30.0   MCHC 31.7*          I have personally reviewed pertinent radiological imaging and reports.     Patient care was time spent personally by me on the following activities:   Obtaining a history  Examination of patient.  Providing medical care at the patients bedside.  Developing a treatment plan with patient or surrogate and bedside caregivers  Ordering and reviewing laboratory studies, radiographic studies, pulse oximetry.  Ordering and performing treatments and interventions.  Evaluation of patient's response to treatment.  Discussions with consultants while on the unit and immediately available to the patient.  Re-evaluation of the patient's condition.  Documentation in the medical record.        Lorne Gómez MD  Nephrology  [facility] [place]  [phone]

## 2023-03-11 DIAGNOSIS — R06.00 DYSPNEA, UNSPECIFIED TYPE: ICD-10-CM

## 2023-03-11 DIAGNOSIS — G47.33 OSA (OBSTRUCTIVE SLEEP APNEA): Primary | ICD-10-CM

## 2023-03-11 PROBLEM — Q61.2 ADPKD (AUTOSOMAL DOMINANT POLYCYSTIC KIDNEY): Status: ACTIVE | Noted: 2019-02-14

## 2023-03-11 PROBLEM — J12.82 PNEUMONIA DUE TO COVID-19 VIRUS: Status: RESOLVED | Noted: 2023-01-08 | Resolved: 2023-03-11

## 2023-03-11 PROBLEM — I21.4 NSTEMI (NON-ST ELEVATED MYOCARDIAL INFARCTION): Status: RESOLVED | Noted: 2021-08-30 | Resolved: 2023-03-11

## 2023-03-11 PROBLEM — I21.4 NSTEMI (NON-ST ELEVATED MYOCARDIAL INFARCTION): Status: ACTIVE | Noted: 2021-08-30

## 2023-03-11 PROBLEM — U07.1 PNEUMONIA DUE TO COVID-19 VIRUS: Status: RESOLVED | Noted: 2023-01-08 | Resolved: 2023-03-11

## 2023-03-11 PROBLEM — Z86.79 HISTORY OF SUBDURAL HEMATOMA: Status: ACTIVE | Noted: 2021-08-30

## 2023-03-11 PROBLEM — E66.01 MORBID OBESITY: Status: ACTIVE | Noted: 2021-08-30

## 2023-03-11 PROBLEM — D64.9 ANEMIA: Status: ACTIVE | Noted: 2023-03-11

## 2023-03-11 PROBLEM — R06.02 SHORTNESS OF BREATH: Status: ACTIVE | Noted: 2023-03-10

## 2023-03-11 PROBLEM — K21.9 GERD (GASTROESOPHAGEAL REFLUX DISEASE): Status: ACTIVE | Noted: 2022-10-24

## 2023-03-11 PROBLEM — E78.5 DYSLIPIDEMIA: Status: ACTIVE | Noted: 2021-12-21

## 2023-03-11 LAB
ANION GAP SERPL CALC-SCNC: 16 MMOL/L (ref 8–16)
BASOPHILS # BLD AUTO: 0.04 K/UL (ref 0–0.2)
BASOPHILS NFR BLD: 0.5 % (ref 0–1.9)
BUN SERPL-MCNC: 42 MG/DL (ref 8–23)
CALCIUM SERPL-MCNC: 9.1 MG/DL (ref 8.7–10.5)
CHLORIDE SERPL-SCNC: 95 MMOL/L (ref 95–110)
CO2 SERPL-SCNC: 28 MMOL/L (ref 23–29)
CREAT SERPL-MCNC: 8.9 MG/DL (ref 0.5–1.4)
DIFFERENTIAL METHOD: ABNORMAL
EOSINOPHIL # BLD AUTO: 0.3 K/UL (ref 0–0.5)
EOSINOPHIL NFR BLD: 3.4 % (ref 0–8)
ERYTHROCYTE [DISTWIDTH] IN BLOOD BY AUTOMATED COUNT: 14.9 % (ref 11.5–14.5)
EST. GFR  (NO RACE VARIABLE): 4.5 ML/MIN/1.73 M^2
ESTIMATED AVG GLUCOSE: 166 MG/DL (ref 68–131)
GLUCOSE SERPL-MCNC: 117 MG/DL (ref 70–110)
GLUCOSE SERPL-MCNC: 137 MG/DL (ref 70–110)
GLUCOSE SERPL-MCNC: 154 MG/DL (ref 70–110)
GLUCOSE SERPL-MCNC: 160 MG/DL (ref 70–110)
GLUCOSE SERPL-MCNC: 217 MG/DL (ref 70–110)
HBA1C MFR BLD: 7.4 % (ref 4.5–6.2)
HCT VFR BLD AUTO: 33 % (ref 37–48.5)
HGB BLD-MCNC: 10.4 G/DL (ref 12–16)
IMM GRANULOCYTES # BLD AUTO: 0.04 K/UL (ref 0–0.04)
IMM GRANULOCYTES NFR BLD AUTO: 0.5 % (ref 0–0.5)
LYMPHOCYTES # BLD AUTO: 1.9 K/UL (ref 1–4.8)
LYMPHOCYTES NFR BLD: 21.9 % (ref 18–48)
MAGNESIUM SERPL-MCNC: 1.7 MG/DL (ref 1.6–2.6)
MCH RBC QN AUTO: 29.9 PG (ref 27–31)
MCHC RBC AUTO-ENTMCNC: 31.5 G/DL (ref 32–36)
MCV RBC AUTO: 95 FL (ref 82–98)
MONOCYTES # BLD AUTO: 0.9 K/UL (ref 0.3–1)
MONOCYTES NFR BLD: 10.1 % (ref 4–15)
NEUTROPHILS # BLD AUTO: 5.6 K/UL (ref 1.8–7.7)
NEUTROPHILS NFR BLD: 63.6 % (ref 38–73)
NRBC BLD-RTO: 0 /100 WBC
PLATELET # BLD AUTO: 275 K/UL (ref 150–450)
PMV BLD AUTO: 10.9 FL (ref 9.2–12.9)
POTASSIUM SERPL-SCNC: 4.6 MMOL/L (ref 3.5–5.1)
RBC # BLD AUTO: 3.48 M/UL (ref 4–5.4)
SODIUM SERPL-SCNC: 139 MMOL/L (ref 136–145)
TROPONIN I SERPL HS-MCNC: 13.6 PG/ML (ref 0–14.9)
TROPONIN I SERPL HS-MCNC: 8.7 PG/ML (ref 0–14.9)
WBC # BLD AUTO: 8.74 K/UL (ref 3.9–12.7)

## 2023-03-11 PROCEDURE — 80048 BASIC METABOLIC PNL TOTAL CA: CPT | Performed by: FAMILY MEDICINE

## 2023-03-11 PROCEDURE — 99900035 HC TECH TIME PER 15 MIN (STAT)

## 2023-03-11 PROCEDURE — G0378 HOSPITAL OBSERVATION PER HR: HCPCS

## 2023-03-11 PROCEDURE — 84484 ASSAY OF TROPONIN QUANT: CPT | Performed by: FAMILY MEDICINE

## 2023-03-11 PROCEDURE — 83036 HEMOGLOBIN GLYCOSYLATED A1C: CPT | Performed by: FAMILY MEDICINE

## 2023-03-11 PROCEDURE — 25000242 PHARM REV CODE 250 ALT 637 W/ HCPCS: Performed by: FAMILY MEDICINE

## 2023-03-11 PROCEDURE — 86706 HEP B SURFACE ANTIBODY: CPT | Performed by: FAMILY MEDICINE

## 2023-03-11 PROCEDURE — 99223 1ST HOSP IP/OBS HIGH 75: CPT | Mod: ,,, | Performed by: INTERNAL MEDICINE

## 2023-03-11 PROCEDURE — 94660 CPAP INITIATION&MGMT: CPT

## 2023-03-11 PROCEDURE — 96372 THER/PROPH/DIAG INJ SC/IM: CPT | Mod: 59 | Performed by: FAMILY MEDICINE

## 2023-03-11 PROCEDURE — 25000003 PHARM REV CODE 250: Performed by: FAMILY MEDICINE

## 2023-03-11 PROCEDURE — 99900031 HC PATIENT EDUCATION (STAT)

## 2023-03-11 PROCEDURE — 85025 COMPLETE CBC W/AUTO DIFF WBC: CPT | Performed by: FAMILY MEDICINE

## 2023-03-11 PROCEDURE — 94761 N-INVAS EAR/PLS OXIMETRY MLT: CPT

## 2023-03-11 PROCEDURE — 87340 HEPATITIS B SURFACE AG IA: CPT | Performed by: FAMILY MEDICINE

## 2023-03-11 PROCEDURE — 84484 ASSAY OF TROPONIN QUANT: CPT | Mod: 91 | Performed by: FAMILY MEDICINE

## 2023-03-11 PROCEDURE — 86704 HEP B CORE ANTIBODY TOTAL: CPT | Performed by: FAMILY MEDICINE

## 2023-03-11 PROCEDURE — 36415 COLL VENOUS BLD VENIPUNCTURE: CPT | Performed by: FAMILY MEDICINE

## 2023-03-11 PROCEDURE — 63600175 PHARM REV CODE 636 W HCPCS: Performed by: FAMILY MEDICINE

## 2023-03-11 PROCEDURE — 99223 PR INITIAL HOSPITAL CARE,LEVL III: ICD-10-PCS | Mod: ,,, | Performed by: INTERNAL MEDICINE

## 2023-03-11 PROCEDURE — 83735 ASSAY OF MAGNESIUM: CPT | Performed by: FAMILY MEDICINE

## 2023-03-11 PROCEDURE — 27000221 HC OXYGEN, UP TO 24 HOURS

## 2023-03-11 RX ORDER — AMOXICILLIN AND CLAVULANATE POTASSIUM 500; 125 MG/1; MG/1
1 TABLET, FILM COATED ORAL DAILY
Status: DISCONTINUED | OUTPATIENT
Start: 2023-03-12 | End: 2023-03-12

## 2023-03-11 RX ADMIN — FLUTICASONE PROPIONATE 50 MCG: 50 SPRAY, METERED NASAL at 09:03

## 2023-03-11 RX ADMIN — CETIRIZINE HYDROCHLORIDE 10 MG: 10 TABLET, FILM COATED ORAL at 09:03

## 2023-03-11 RX ADMIN — PANTOPRAZOLE SODIUM 40 MG: 40 TABLET, DELAYED RELEASE ORAL at 05:03

## 2023-03-11 RX ADMIN — ATORVASTATIN CALCIUM 40 MG: 40 TABLET, FILM COATED ORAL at 09:03

## 2023-03-11 RX ADMIN — CALCITRIOL CAPSULES 0.25 MCG 0.25 MCG: 0.25 CAPSULE ORAL at 09:03

## 2023-03-11 RX ADMIN — MUPIROCIN 1 G: 20 OINTMENT TOPICAL at 09:03

## 2023-03-11 RX ADMIN — CARVEDILOL 12.5 MG: 12.5 TABLET, FILM COATED ORAL at 09:03

## 2023-03-11 RX ADMIN — AMLODIPINE BESYLATE 5 MG: 5 TABLET ORAL at 09:03

## 2023-03-11 RX ADMIN — INSULIN ASPART 1 UNITS: 100 INJECTION, SOLUTION INTRAVENOUS; SUBCUTANEOUS at 10:03

## 2023-03-11 RX ADMIN — HEPARIN SODIUM 5000 UNITS: 5000 INJECTION, SOLUTION INTRAVENOUS; SUBCUTANEOUS at 09:03

## 2023-03-11 RX ADMIN — CARVEDILOL 12.5 MG: 12.5 TABLET, FILM COATED ORAL at 05:03

## 2023-03-11 RX ADMIN — IPRATROPIUM BROMIDE 2 SPRAY: 42 SPRAY, METERED NASAL at 09:03

## 2023-03-11 RX ADMIN — CINACALCET 30 MG: 30 TABLET, FILM COATED ORAL at 05:03

## 2023-03-11 RX ADMIN — HEPARIN SODIUM 5000 UNITS: 5000 INJECTION, SOLUTION INTRAVENOUS; SUBCUTANEOUS at 02:03

## 2023-03-11 RX ADMIN — AMOXICILLIN AND CLAVULANATE POTASSIUM 500 MG: 500; 125 TABLET, FILM COATED ORAL at 09:03

## 2023-03-11 RX ADMIN — ASPIRIN 81 MG: 81 TABLET, COATED ORAL at 09:03

## 2023-03-11 NOTE — CARE UPDATE
03/11/23 0758   Patient Assessment/Suction   Level of Consciousness (AVPU) alert   Respiratory Effort Normal;Unlabored   Expansion/Accessory Muscles/Retractions no use of accessory muscles;no retractions;expansion symmetric   All Lung Fields Breath Sounds clear   Rhythm/Pattern, Respiratory unlabored;pattern regular;depth regular;chest wiggle adequate;no shortness of breath reported   Cough Frequency no cough   Skin Integrity   $ Wound Care Tech Time 15 min   Area Observed Bridge of nose   Skin Appearance without discoloration   PRE-TX-O2   Device (Oxygen Therapy) room air   $ Is the patient on Low Flow Oxygen? Yes   SpO2 95 %   Pulse Oximetry Type Intermittent   $ Pulse Oximetry - Multiple Charge Pulse Oximetry - Multiple   Pulse 85   Resp 18   Aerosol Therapy   $ Aerosol Therapy Charges PRN treatment not required   Preset CPAP/BiPAP Settings   Mode Of Delivery CPAP;Standby   $ CPAP/BiPAP Daily Charge BiPAP/CPAP Daily   Equipment Type V60   Education   $ Education Bronchodilator;15 min   Respiratory Evaluation   $ Care Plan Tech Time 15 min

## 2023-03-11 NOTE — H&P
[facility] Medicine   History & Physical     Patient Name: Marian Dumont  MRN: [id number]  Admission Date: 3/10/2023  1:48 PM  Attending Physician: Sima Nelson MD  Face-to-Face encounter date: 03/10/2023 7:34 PM    Patient information was obtained from patient, past medical records, ER physician, and ER records.     HISTORY OF PRESENT ILLNESS:     Marian Dumont is a 63 y.o. Black or  female   With PMH of ESRD on HD, DM2, HTN,  who presents with SOB.    Onset 3 days ago  Progressively worsening  Worse with exertion   +orthopnea  Pt hasn't been able to sleep in 3 days  She is compliant with HD  Today had 2L off  However she continues SOB  No CP--she reports chest pressure but only when she lays down  No JADE as of 2018, however she wasn't ESRD then, and now has PND and worsening snoring  No fevers    Pt taken directly for repeat HD from the ER    REVIEW OF SYSTEMS:     All systems reviewed and are negative except as noted per above.    PAST MEDICAL HISTORY:     Past Medical History:   Diagnosis Date    COVID-19     Diabetes mellitus     Dialysis patient     Hypertension     Mixed hyperlipidemia     Obese body habitus        PAST SURGICAL HISTORY:     Pericardial window 2/2 pericarditis    ALLERGIES:   Ace inhibitors    FAMILY HISTORY:   HTN    SOCIAL HISTORY:     Non-smoker    HOME MEDICATIONS:     Prior to Admission medications    Medication Sig Start Date End Date Taking? Authorizing Provider   amLODIPine (NORVASC) 5 MG tablet Take 5 mg by mouth 2 (two) times daily.   Yes Historical Provider   amoxicillin-clavulanate 500-125mg (AUGMENTIN) 500-125 mg Tab Take 1 tablet by mouth 2 (two) times daily. 3/6/23  Yes Historical Provider   aspirin (ECOTRIN) 81 MG EC tablet Take 81 mg by mouth once daily.   Yes Historical Provider   carvediloL (COREG) 12.5 MG tablet Take 12.5 mg by mouth 2 (two) times daily with meals.   Yes Historical Provider   cetirizine (ZYRTEC) 10 MG tablet  Take 10 mg by mouth once daily.   Yes Historical Provider   cinacalcet (SENSIPAR) 30 MG Tab Take 30 mg by mouth Daily. 1/18/23  Yes Historical Provider   fluticasone propionate (FLONASE) 50 mcg/actuation nasal spray 1 spray by Each Nostril route 2 (two) times a day. 11/21/22  Yes Historical Provider   ipratropium (ATROVENT) 42 mcg (0.06 %) nasal spray 2 sprays by Each Nostril route 2 (two) times daily. 1/16/23  Yes Historical Provider   omeprazole (PRILOSEC) 40 MG capsule Take 40 mg by mouth once daily.   Yes Historical Provider   rosuvastatin (CRESTOR) 10 MG tablet Take 10 mg by mouth once daily.   Yes Historical Provider   SAXagliptin (ONGLYZA) 2.5 mg Tab tablet Take 2.5 mg by mouth once daily.   Yes Historical Provider   calcitRIOL (ROCALTROL) 0.25 MCG Cap Take 0.25 mcg by mouth once daily.    Historical Provider   LIDOcaine-prilocaine (EMLA) cream Apply 1 each topically as needed. 1/26/23   Historical Provider   ondansetron (ZOFRAN) 4 MG tablet Take 1 tablet (4 mg total) by mouth 2 (two) times daily. 1/11/23   Brennon Donohue MD   amoxicillin-clavulanate 500-125mg (AUGMENTIN) 500-125 mg Tab Take 1 tablet twice daily for the next 10 days.  If you are taking it on a dialysis day, take the dose after dialysis. 3/6/23 3/10/23  Historical Provider   aspirin (ECOTRIN) 81 MG EC tablet Take 1 tablet by mouth every morning.  3/10/23  Historical Provider   carvediloL (COREG) 12.5 MG tablet Take 12.5 mg by mouth. 10/1/22 3/10/23  Historical Provider   cefUROXime (CEFTIN) 500 MG tablet Take 1 tablet (500 mg total) by mouth every 12 (twelve) hours. 1/11/23 3/10/23  Brennon Donohue MD   cinacalcet HCl (CINACALCET ORAL) Take 30 mg by mouth once daily.  3/10/23  Historical Provider   ipratropium (ATROVENT) 42 mcg (0.06 %) nasal spray USE 2 SPRAYS IN EACH NOSTRIL IN THE MORNING AND 2 SPRAYS BEFORE BEDTIME 1/16/23 3/10/23  Historical Provider   neomycin-polymyxin-hydrocortisone (CORTISPORIN) otic solution Place 3 drops into both ears  "As instructed. 2/13/23 3/10/23  Historical Provider       PHYSICAL EXAM:     /77   Pulse 90   Temp 98.1 °F (36.7 °C) (Oral)   Resp 20   Ht 5' 6" (1.676 m)   Wt 136.1 kg (300 lb)   SpO2 95%   Breastfeeding No   BMI 48.42 kg/m²     Gen: alert, responsive  HEENT:  Eyes - no pallor  External ears with no lesions  Nares patent  Mouth/Throat:  trachea midline  CV: RRR  Lungs: CTA B/L  (pt seen after HD, pt sent directly from ER)  Abd: +BS, soft, NT, ND  Ext: no atrophy or edema  Skin: warm, dry  Neuro: grossly intact  Psych: anxious    LABS AND IMAGING:     Labs Reviewed   CBC W/ AUTO DIFFERENTIAL - Abnormal; Notable for the following components:       Result Value    RBC 3.37 (*)     Hemoglobin 10.1 (*)     Hematocrit 31.9 (*)     MCHC 31.7 (*)     RDW 15.1 (*)     Lymph % 17.7 (*)     All other components within normal limits   COMPREHENSIVE METABOLIC PANEL - Abnormal; Notable for the following components:    Chloride 94 (*)     CO2 36 (*)     Glucose 142 (*)     BUN 30 (*)     Creatinine 7.1 (*)     Albumin 3.4 (*)     Alkaline Phosphatase 52 (*)     eGFR 5.9 (*)     All other components within normal limits   CULTURE, BLOOD   CULTURE, BLOOD   B-TYPE NATRIURETIC PEPTIDE   MAGNESIUM   PROTIME-INR   TSH   TROPONIN I HIGH SENSITIVITY   LACTIC ACID, PLASMA   HEPATITIS B SURFACE ANTIGEN   HEPATITIS B SURFACE ANTIBODY   HEPATITIS B CORE ANTIBODY, TOTAL   HEMOGLOBIN A1C       Imaging Results              CTA Chest Non-Coronary (PE Studies) (Final result)  Result time 03/10/23 18:30:30      Final result by Edmund Shanks MD (03/10/23 18:30:30)                   Narrative:      EXAM: CTA CHEST NON CORONARY (PE STUDIES)    HISTORY: Pulmonary embolism (PE) suspected, high prob    COMPARISON: CTA of the chest dated 1/7/2023    TECHNIQUE: Spiral CT images were obtained through the chest during rapid IV injection of contrast and were reconstructed in 1 mm thick axial slices. Multiple coronal and sagittal MIP " reconstructions were obtained.    This exam was performed according to our departmental dose-optimization program, which includes automated exposure control, adjustment of the mA and/or kV according to patient size and/or use of iterative reconstruction technique.    FINDINGS: The main pulmonary arteries and their lobar and segmental branches are fairly well opacified, and demonstrate no filling defects. There is no CT evidence of acute pulmonary thromboembolism. Note that small distal emboli cannot be excluded by this study. The thoracic aorta was also well-opacified and appears within normal limits. The heart is moderately enlarged. There is no mediastinal or hilar or axillary lymphadenopathy. Lung window images demonstrate no parenchymal infiltrates or discrete lung masses. There are no pleural effusions. Images through the upper abdomen demonstrate innumerable cysts distributed throughout the liver that were much better evaluated on the recent previous CT scan of the abdomen dated 1/10/2023.    IMPRESSION:   Negative CTA of the chest with PE protocol. There is no CT evidence of pulmonary embolism or other acute process. There is moderate cardiomegaly.    Electronically signed by:  Nakul Shanks MD  3/10/2023 6:30 PM CST Workstation: [id number]                                     X-Ray Chest AP Portable (Final result)  Result time 03/10/23 14:43:15      Final result by Varun Marinelli MD (03/10/23 14:43:15)                   Narrative:    Reason: Shortness of breath COVID, SOB    FINDINGS:  Portable chest at 1421 compared with 1/7/2023 shows enlarged cardiac silhouette size with normal mediastinal contours. Left axillary vascular stent unchanged.    Lungs are clear. Pulmonary vasculature is normal. No acute osseous abnormality.    IMPRESSION:  Unchanged cardiomegaly without acute cardiopulmonary abnormality.    Electronically signed by:  Varun Marinelli MD  3/10/2023 2:43 PM CST Workstation: [id number]                                     ASSESSMENT & PLAN:   Marian Dumont is a 63 y.o. female admitted for    Active Hospital Problems    Diagnosis  POA    *Volume overload [E87.70]  Yes    ESRD (end stage renal disease) [N18.6]  Yes    Primary hypertension [I10]  Yes    Type 2 diabetes mellitus without complication, without long-term current use of insulin [E11.9]  Yes      Resolved Hospital Problems   No resolved problems to display.        Plan    Acute hypoxemic respiratory failure  Suspected volume overload  ESRD on HD  Suspected JADE/OHS  - supplemental oxygen, wean as tolerated  - pt taken directly for UF from ER  - echo  - EKG, trending troponin  - telemetry monitoring  - BIPAP QHS  - nephrology following, thank you    Chronic conditions as noted above/below; home medications reviewed personally by me and restarted as appropriate  Electrolyte derangement:  Trending BMP; Mg; replacement prn  DVT ppx: heparin  FULL CODE      - The above conditions include an acute and/or chronic illness that poses a threat to life (or bodily function).  - Previous notes/encounters/external records reviewed personally by me.  - Pt's case personally discussed with another physician:  ER physician     Sima Nelson MD  [facility] Hospitalist  03/10/2023

## 2023-03-11 NOTE — PROGRESS NOTES
Pharmacist Renal Dose Adjustment Note    Marian Dumont is a 63 y.o. female being treated with the medication Amoxicillin/Clavulanate.    Patient Data:    Vital Signs (Most Recent):  Temp: 97.5 °F (36.4 °C) (03/11/23 0700)  Pulse: 85 (03/11/23 0758)  Resp: 18 (03/11/23 0758)  BP: (!) 142/45 (03/11/23 0700)  SpO2: 95 % (03/11/23 0758)   Vital Signs (72h Range):  Temp:  [97.5 °F (36.4 °C)-98.5 °F (36.9 °C)]   Pulse:  [78-97]   Resp:  [18-24]   BP: ()/(45-84)   SpO2:  [94 %-98 %]      Recent Labs   Lab 03/10/23  1509 03/11/23  0747   CREATININE 7.1* 8.9*     Serum creatinine: 8.9 mg/dL (H) 03/11/23 0747  Estimated creatinine clearance: 8.9 mL/min (A)    Medication:Amoxicillin/Clavulanate dose: 500 mg frequency BID will be changed to medication:Amoxicillin/Clavulanate dose:500 mg frequency:Daily    Pharmacist's Name: Aislinn Pham  Pharmacist's Extension: [phone]

## 2023-03-11 NOTE — PROGRESS NOTES
03/10/23 1850   Handoff Report   Received From Adiel Ho, RN   Given To Eugenie Hall RN   Vital Signs   Temp 98.1 °F (36.7 °C)   Temp Source Oral   Pulse 90   Resp 20   SpO2 95 %   /77   Post-Hemodialysis Assessment   Post-Hemodialysis Comments Pt stable

## 2023-03-11 NOTE — CONSULTS
"Pulmonary/Critical Care Consult      PATIENT NAME: Marian Dumont  MRN: [id number]  TODAY'S DATE: 2023  ADMIT DATE: 3/10/2023  AGE: 63 y.o. : 1960    CONSULT REQUESTED BY: No att. providers found    REASON FOR CONSULT:   Possible JADE/OHS    HISTORY OF PRESENT ILLNESS   Marian Dumont is a 63 y.o. female with a PMH of ESRD, HTN, and DM2 on whom we have been consulted for shortness of breath and concern for JADE/OHS.    As per Dr. Nelson's H&P:  "...presents with SOB.    Onset 3 days ago  Progressively worsening  Worse with exertion   +orthopnea  Pt hasn't been able to sleep in 3 days  She is compliant with HD  Today had 2L off  However she continues SOB  No CP--she reports chest pressure but only when she lays down  No JADE as of 2018, however she wasn't ESRD then, and now has PND and worsening snoring  No fevers     Pt taken directly for repeat HD from the ER"    She is net -1500 mL since admission. BP dropped when more ultrafiltration was attempted, so it was aborted. BNP was low at 42; although the patient is very severely obese. She tolerated BiPAP for part of the night.      SMOKING HISTORY  Never smoker.    EXPOSURE HISTORY  None.    REVIEW OF SYSTEMS  GENERAL: Feeling well. No fevers, chills, or night sweats.  EYES: Vision is good.  ENT: No sinusitis or pharyngitis.   HEART: No chest pain or palpitations.  LUNGS: No cough, sputum, or wheezing. SOB when lying flat.  GI: No abdominal pain, nausea, vomiting, or diarrhea.  : No dysuria, urgency, or frequency.  SKIN: No lesions or rashes.  MUSCULOSKELETAL: No joint pain or myalgias.  NEURO: No headaches or neuropathy.  LYMPH: No edema or adenopathy.  PSYCH: No anxiety or depression.  ENDO: No unexpected weight change.    ALLERGIES  Review of patient's allergies indicates:   Allergen Reactions    Ace inhibitors        INPATIENT SCHEDULED MEDICATIONS   amLODIPine  5 mg Oral Daily    amoxicillin-clavulanate 500-125mg  1 tablet Oral BID    aspirin  81 " mg Oral Daily    atorvastatin  40 mg Oral Daily    calcitRIOL  0.25 mcg Oral Daily    carvediloL  12.5 mg Oral BID WM    cetirizine  10 mg Oral Daily    cinacalcet  30 mg Oral Daily    fluticasone propionate  1 spray Each Nostril BID    heparin (porcine)  5,000 Units Subcutaneous Q8H    ipratropium  2 spray Each Nostril BID    mupirocin   Nasal BID    pantoprazole  40 mg Oral Daily         MEDICAL AND SURGICAL HISTORY  Past Medical History:   Diagnosis Date    COVID-19     Diabetes mellitus     Dialysis patient     Hypertension     Mixed hyperlipidemia     Obese body habitus      No past surgical history on file.    ALCOHOL, TOBACCO AND DRUG USE  Social History     Tobacco Use   Smoking Status Not on file   Smokeless Tobacco Not on file     Social History     Substance and Sexual Activity   Alcohol Use None     Social History     Substance and Sexual Activity   Drug Use Not on file       FAMILY HISTORY  No family history on file.    VITAL SIGNS (MOST RECENT)  Temp: 97.5 °F (36.4 °C) (03/11/23 0700)  Pulse: 85 (03/11/23 0758)  Resp: 18 (03/11/23 0758)  BP: (!) 142/45 (03/11/23 0700)  SpO2: 95 % (03/11/23 0758)    INTAKE AND OUTPUT (LAST 24 HOURS):  Intake/Output Summary (Last 24 hours) at 3/11/2023 1322  Last data filed at 3/10/2023 1845  Gross per 24 hour   Intake 500 ml   Output 2000 ml   Net -1500 ml       WEIGHT  Wt Readings from Last 1 Encounters:   03/10/23 135.9 kg (299 lb 9.6 oz)       PHYSICAL EXAM  GENERAL: A&O. NAD.  HEENT: Extraocular movements intact. Pharynx moist.  NECK: Supple. No JVD or hepatojugular reflux.  HEART: Regular rate and normal rhythm. No murmur or gallop auscultated.  LUNGS: Clear to auscultation and percussion. Lung excursion symmetrical.   ABDOMEN: Soft, non-tender, non-distended, no masses palpated.  EXTREMITIES: Normal muscle tone and joint movement, no cyanosis or clubbing.   LYMPHATICS: No adenopathy palpated, no edema.  SKIN: Dry, intact, no lesions.   NEURO: No gross  deficit.  PSYCH: Appropriate affect    ACUTE PHASE REACTANT (LAST 24 HOURS)  No results for input(s): FERRITIN, CRP, LDH, DDIMER in the last 24 hours.    CBC LAST (LAST 24 HOURS)  Recent Labs   Lab 03/11/23  0747   WBC 8.74   RBC 3.48*   HGB 10.4*   HCT 33.0*   MCV 95   MCH 29.9   MCHC 31.5*   RDW 14.9*      MPV 10.9   GRAN 63.6  5.6   LYMPH 21.9  1.9   MONO 10.1  0.9   BASO 0.04   NRBC 0       CHEMISTRY LAST (LAST 24 HOURS)  Recent Labs   Lab 03/10/23  1509 03/11/23  0747    139   K 4.1 4.6   CL 94* 95   CO2 36* 28   ANIONGAP 13 16   BUN 30* 42*   CREATININE 7.1* 8.9*   * 117*   CALCIUM 9.5 9.1   MG 1.6 1.7   ALBUMIN 3.4*  --    PROT 7.2  --    ALKPHOS 52*  --    ALT 10  --    AST 13  --    BILITOT 0.5  --        COAGULATION LAST (LAST 24 HOURS)  Recent Labs   Lab 03/10/23  1509   INR 1.0       CARDIAC PROFILE (LAST 24 HOURS)  Recent Labs   Lab 03/10/23  1509   BNP 42       LAST 7 DAYS MICROBIOLOGY   Microbiology Results (last 7 days)       Procedure Component Value Units Date/Time    Blood culture #2 **CANNOT BE ORDERED STAT** [[id number]] Collected: 03/10/23 1630    Order Status: Completed Specimen: Blood from Peripheral, Hand, Right Updated: 03/10/23 2317     Blood Culture, Routine No Growth to date    Blood culture #1 **CANNOT BE ORDERED STAT** [[id number]] Collected: 03/10/23 1509    Order Status: Completed Specimen: Blood from Peripheral, Forearm, Right Updated: 03/10/23 2158     Blood Culture, Routine No Growth to date            MOST RECENT IMAGING  CTA Chest Non-Coronary (PE Studies)  EXAM: CTA CHEST NON CORONARY (PE STUDIES)    HISTORY: Pulmonary embolism (PE) suspected, high prob    COMPARISON: CTA of the chest dated 1/7/2023    TECHNIQUE: Spiral CT images were obtained through the chest during rapid IV injection of contrast and were reconstructed in 1 mm thick axial slices. Multiple coronal and sagittal MIP reconstructions were obtained.    This exam was performed according to our  departmental dose-optimization program, which includes automated exposure control, adjustment of the mA and/or kV according to patient size and/or use of iterative reconstruction technique.    FINDINGS: The main pulmonary arteries and their lobar and segmental branches are fairly well opacified, and demonstrate no filling defects. There is no CT evidence of acute pulmonary thromboembolism. Note that small distal emboli cannot be excluded by this study. The thoracic aorta was also well-opacified and appears within normal limits. The heart is moderately enlarged. There is no mediastinal or hilar or axillary lymphadenopathy. Lung window images demonstrate no parenchymal infiltrates or discrete lung masses. There are no pleural effusions. Images through the upper abdomen demonstrate innumerable cysts distributed throughout the liver that were much better evaluated on the recent previous CT scan of the abdomen dated 1/10/2023.    IMPRESSION:   Negative CTA of the chest with PE protocol. There is no CT evidence of pulmonary embolism or other acute process. There is moderate cardiomegaly.    Electronically signed by:  Nakul Shanks MD  3/10/2023 6:30 PM CST Workstation: [id number]  X-Ray Chest AP Portable  Reason: Shortness of breath COVID, SOB    FINDINGS:  Portable chest at 1421 compared with 1/7/2023 shows enlarged cardiac silhouette size with normal mediastinal contours. Left axillary vascular stent unchanged.    Lungs are clear. Pulmonary vasculature is normal. No acute osseous abnormality.    IMPRESSION:  Unchanged cardiomegaly without acute cardiopulmonary abnormality.    Electronically signed by:  Varun Marinelli MD  3/10/2023 2:43 PM CST Workstation: [id number]      CURRENT VISIT EKG  Results for orders placed or performed during the hospital encounter of 03/10/23   EKG 12-lead    Narrative    Test Reason : R06.02,    Vent. Rate : 089 BPM     Atrial Rate : 089 BPM     P-R Int : 144 ms          QRS Dur : 080 ms       QT Int : 410 ms       P-R-T Axes : 029 -24 011 degrees     QTc Int : 498 ms    Normal sinus rhythm  Minimal voltage criteria for LVH, may be normal variant ( R in aVL )  Anterolateral infarct ,age undetermined  Abnormal ECG  When compared with ECG of 07-JAN-2023 19:16,  Anterolateral infarct is now Present  Nonspecific T wave abnormality now evident in Inferior leads    Referred By:             Confirmed By:        ECHOCARDIOGRAM RESULTS  Results for orders placed during the hospital encounter of 01/07/23    Echo    Interpretation Summary  · The left ventricle is normal in size with moderate concentric hypertrophy and normal systolic function.  · Moderate left atrial enlargement.  · The estimated ejection fraction is 65%.  · Normal left ventricular diastolic function.  · Mild mitral regurgitation.  · Mild right atrial enlargement.  · Normal right ventricular size with normal right ventricular systolic function.  · Normal central venous pressure (3 mmHg).    I have personally viewed and interpreted last night's CTA chest, and there is minor dependent atelectasis but no dense consolidation or signs of overt pulmonary edema; there is no large PE.    RESPIRATORY SUPPORT  Oxygen Concentration (%):  [28] 28       LAST ARTERIAL BLOOD GAS  ABG  No results for input(s): PH, PO2, PCO2, HCO3, BE in the last 168 hours.    IMPRESSION AND PLAN  Marian Dumont is a 63 y.o. female with a PMH of ESRD, HTN, and DM2 on whom we have been consulted for shortness of breath and concern for JADE/OHS.    #Likely JADE/OHS  #Shortness of breath  #Very severe obesity  Serum bicarb has been high normal to abnormally high since January, suggesting chronic hypercapnia.  - agree with BIPAP with sleep  - obtain ABG 2 weeks post-discharge (ordered)  - outpatient PFTs and sleep study (ordered)  - I have requested a new pt visit in my clinic for 4 weeks from now.    Pulmonary & Critical Care Medicine will sign off at this time.   Please call with any  further questions or concerns.    Discussed with hospitalist.    Kendall Douglas MD  [facility] / [facility]  Department of Pulmonology  Date of Service: 03/11/2023  1:22 PM

## 2023-03-11 NOTE — CONSULTS
INPATIENT NEPHROLOGY CONSULT   [facility] NEPHROLOGY    Marian Dumont  03/11/2023    Reason for consultation:    esrd    Chief Complaint:   Chief Complaint   Patient presents with    Shortness of Breath     Pt is here for c/o sob for the last three days, she said the last time she felt like this was when she had pna. Pt is hd pt that went today she had 2 liters removed.           History of Present Illness:    Per ER  63-year-old female presents complaining of shortness of breath patient reports shortness of breath worsening for the past 3 days patient reports that she has a history of fluid overload in the past, patient reports that she went to dialysis today and had approximately 2 L removed patient reports that she is still unable to lie flat and that she has not been able to sleep in several days given that she can not lie down without becoming short of breath.  Currently patient is sitting up in the bed and is unable to lie recumbent.    3/10  c/o orthopnea.  No nausea, chest pain,  fever, urinary or bowel complaint, new neurologic symptoms, new joint pain  3/11 supplemental uf of 1.5 liters last pm.  AFVSS.        Plan of Care:       Assessment:    esrd  --continue dialysis per routine  --fluid restrict  --renal dose medication per routine  --continue outpt medication  --continue binders with meals    Anemia  --erythropoiesis stimulating agent with renal replacement therapy    Hyperphosphatemia  --renal diet  --continue binders    Hypertension  --uf with hd  --fluid restrict  --low salt diet  --continue home medication    SOB  --cxr unrevealing  --bnp normal  --will do isolated uf tonight   - cta chest negative for pulmonary embolism.  No pleural effusions or masses.  No parenchymal infiltrates.         Thank you for allowing us to participate in this patient's care. We will continue to follow.    Vital Signs:  Temp Readings from Last 3 Encounters:   03/11/23 97.5 °F (36.4 °C) (Oral)   01/11/23 98.1 °F  (36.7 °C) (Oral)       Pulse Readings from Last 3 Encounters:   03/11/23 85   01/11/23 88       BP Readings from Last 3 Encounters:   03/11/23 (!) 142/45   01/11/23 133/82       Weight:  Wt Readings from Last 3 Encounters:   03/10/23 135.9 kg (299 lb 9.6 oz)   01/08/23 135.4 kg (298 lb 9.6 oz)   01/08/23 135.4 kg (298 lb 8.1 oz)       Past Medical & Surgical History:  Past Medical History:   Diagnosis Date    COVID-19     Diabetes mellitus     Dialysis patient     Hypertension     Mixed hyperlipidemia     Obese body habitus        No past surgical history on file.    Past Social History:  Social History     Socioeconomic History    Marital status:        Medications:  No current facility-administered medications on file prior to encounter.     Current Outpatient Medications on File Prior to Encounter   Medication Sig Dispense Refill    amLODIPine (NORVASC) 5 MG tablet Take 5 mg by mouth 2 (two) times daily.      amoxicillin-clavulanate 500-125mg (AUGMENTIN) 500-125 mg Tab Take 1 tablet by mouth 2 (two) times daily.      aspirin (ECOTRIN) 81 MG EC tablet Take 81 mg by mouth once daily.      carvediloL (COREG) 12.5 MG tablet Take 12.5 mg by mouth 2 (two) times daily with meals.      cetirizine (ZYRTEC) 10 MG tablet Take 10 mg by mouth once daily.      cinacalcet (SENSIPAR) 30 MG Tab Take 30 mg by mouth Daily.      fluticasone propionate (FLONASE) 50 mcg/actuation nasal spray 1 spray by Each Nostril route 2 (two) times a day.      ipratropium (ATROVENT) 42 mcg (0.06 %) nasal spray 2 sprays by Each Nostril route 2 (two) times daily.      omeprazole (PRILOSEC) 40 MG capsule Take 40 mg by mouth once daily.      rosuvastatin (CRESTOR) 10 MG tablet Take 10 mg by mouth once daily.      SAXagliptin (ONGLYZA) 2.5 mg Tab tablet Take 2.5 mg by mouth once daily.      calcitRIOL (ROCALTROL) 0.25 MCG Cap Take 0.25 mcg by mouth once daily.      LIDOcaine-prilocaine (EMLA) cream Apply 1 each topically as needed.       "ondansetron (ZOFRAN) 4 MG tablet Take 1 tablet (4 mg total) by mouth 2 (two) times daily. 34 tablet 0     Scheduled Meds:   amLODIPine  5 mg Oral Daily    amoxicillin-clavulanate 500-125mg  1 tablet Oral BID    aspirin  81 mg Oral Daily    atorvastatin  40 mg Oral Daily    calcitRIOL  0.25 mcg Oral Daily    carvediloL  12.5 mg Oral BID WM    cetirizine  10 mg Oral Daily    cinacalcet  30 mg Oral Daily    fluticasone propionate  1 spray Each Nostril BID    heparin (porcine)  5,000 Units Subcutaneous Q8H    ipratropium  2 spray Each Nostril BID    mupirocin   Nasal BID    pantoprazole  40 mg Oral Daily     Continuous Infusions:  PRN Meds:.    Allergies:  Ace inhibitors    Past Family History:  Reviewed; refer to Hospitalist Admission Note    Review of Systems:  Review of Systems - All 14 systems reviewed and negative, except as noted in HPI    Physical Exam:    BP (!) 142/45 (BP Location: Right arm, Patient Position: Lying)   Pulse 85   Temp 97.5 °F (36.4 °C) (Oral)   Resp 18   Ht 5' 6" (1.676 m)   Wt 135.9 kg (299 lb 9.6 oz)   SpO2 95%   Breastfeeding No   BMI 48.36 kg/m²     General Appearance:    Alert, cooperative, no distress, appears stated age   Head:    Normocephalic, without obvious abnormality, atraumatic   Eyes:    PER, conjunctiva/corneas clear, EOM's intact in both eyes        Throat:   Lips, mucosa, and tongue normal; teeth and gums normal   Back:     Symmetric, no curvature, ROM normal, no CVA tenderness   Lungs:     Clear to auscultation bilaterally, respirations unlabored   Chest wall:    No tenderness or deformity   Heart:    Regular rate and rhythm, S1 and S2 normal, no murmur, rub   or gallop   Abdomen:     Soft, non-tender, bowel sounds active all four quadrants,     no masses, no organomegaly   Extremities:   Extremities normal, atraumatic, no cyanosis or edema   Pulses:   2+ and symmetric all extremities   MSK:   No joint or muscle swelling, tenderness or deformity   Skin:   Skin color, " texture, turgor normal, no rashes or lesions   Neurologic:   CNII-XII intact, normal strength and sensation       Throughout.  No flap     Results:  Lab Results   Component Value Date     03/11/2023    K 4.6 03/11/2023    CL 95 03/11/2023    CO2 28 03/11/2023    BUN 42 (H) 03/11/2023    CREATININE 8.9 (H) 03/11/2023    CALCIUM 9.1 03/11/2023    ANIONGAP 16 03/11/2023       Lab Results   Component Value Date    CALCIUM 9.1 03/11/2023    PHOS 3.4 01/10/2023    PHOS 3.4 01/10/2023       Recent Labs   Lab 03/11/23  0747   WBC 8.74   RBC 3.48*   HGB 10.4*   HCT 33.0*      MCV 95   MCH 29.9   MCHC 31.5*            I have personally reviewed pertinent radiological imaging and reports.     Patient care was time spent personally by me on the following activities:   Obtaining a history  Examination of patient.  Providing medical care at the patients bedside.  Developing a treatment plan with patient or surrogate and bedside caregivers  Ordering and reviewing laboratory studies, radiographic studies, pulse oximetry.  Ordering and performing treatments and interventions.  Evaluation of patient's response to treatment.  Discussions with consultants while on the unit and immediately available to the patient.  Re-evaluation of the patient's condition.  Documentation in the medical record.        Lorne Gómez MD  Nephrology  [facility]  [phone]

## 2023-03-12 VITALS
TEMPERATURE: 98 F | HEART RATE: 80 BPM | DIASTOLIC BLOOD PRESSURE: 60 MMHG | WEIGHT: 293 LBS | SYSTOLIC BLOOD PRESSURE: 118 MMHG | OXYGEN SATURATION: 94 % | RESPIRATION RATE: 17 BRPM | BODY MASS INDEX: 47.09 KG/M2 | HEIGHT: 66 IN

## 2023-03-12 PROCEDURE — 96372 THER/PROPH/DIAG INJ SC/IM: CPT | Mod: 59 | Performed by: FAMILY MEDICINE

## 2023-03-12 PROCEDURE — 25000003 PHARM REV CODE 250: Performed by: FAMILY MEDICINE

## 2023-03-12 PROCEDURE — 63600175 PHARM REV CODE 636 W HCPCS: Performed by: FAMILY MEDICINE

## 2023-03-12 PROCEDURE — G0378 HOSPITAL OBSERVATION PER HR: HCPCS

## 2023-03-12 PROCEDURE — 96374 THER/PROPH/DIAG INJ IV PUSH: CPT

## 2023-03-12 RX ORDER — ONDANSETRON 4 MG/1
4 TABLET, FILM COATED ORAL EVERY 6 HOURS PRN
Qty: 34 TABLET | Refills: 0
Start: 2023-03-12

## 2023-03-12 RX ADMIN — FLUTICASONE PROPIONATE 50 MCG: 50 SPRAY, METERED NASAL at 09:03

## 2023-03-12 RX ADMIN — ASPIRIN 81 MG: 81 TABLET, COATED ORAL at 09:03

## 2023-03-12 RX ADMIN — CALCITRIOL CAPSULES 0.25 MCG 0.25 MCG: 0.25 CAPSULE ORAL at 09:03

## 2023-03-12 RX ADMIN — PANTOPRAZOLE SODIUM 40 MG: 40 TABLET, DELAYED RELEASE ORAL at 06:03

## 2023-03-12 RX ADMIN — HEPARIN SODIUM 5000 UNITS: 5000 INJECTION, SOLUTION INTRAVENOUS; SUBCUTANEOUS at 06:03

## 2023-03-12 RX ADMIN — ATORVASTATIN CALCIUM 40 MG: 40 TABLET, FILM COATED ORAL at 09:03

## 2023-03-12 RX ADMIN — AMLODIPINE BESYLATE 5 MG: 5 TABLET ORAL at 09:03

## 2023-03-12 RX ADMIN — CARVEDILOL 12.5 MG: 12.5 TABLET, FILM COATED ORAL at 09:03

## 2023-03-12 RX ADMIN — MUPIROCIN 1 G: 20 OINTMENT TOPICAL at 09:03

## 2023-03-12 RX ADMIN — IPRATROPIUM BROMIDE 2 SPRAY: 42 SPRAY, METERED NASAL at 09:03

## 2023-03-12 RX ADMIN — ONDANSETRON 4 MG: 2 INJECTION INTRAMUSCULAR; INTRAVENOUS at 04:03

## 2023-03-12 RX ADMIN — CETIRIZINE HYDROCHLORIDE 10 MG: 10 TABLET, FILM COATED ORAL at 09:03

## 2023-03-12 NOTE — CARE UPDATE
03/11/23 2150   Patient Assessment/Suction   Level of Consciousness (AVPU) alert   Respiratory Effort Normal;Unlabored   Expansion/Accessory Muscles/Retractions no use of accessory muscles   Rhythm/Pattern, Respiratory assisted mechanically   Skin Integrity   $ Wound Care Tech Time 15 min   Area Observed Bridge of nose   Skin Appearance without discoloration   PRE-TX-O2   Device (Oxygen Therapy) CPAP   $ Is the patient on Low Flow Oxygen? Yes   Oxygen Concentration (%) 28   SpO2 95 %   Pulse Oximetry Type Intermittent   Pulse 90   Resp 18   Aerosol Therapy   $ Aerosol Therapy Charges PRN treatment not required   Respiratory Treatment Status (SVN) PRN treatment not required   Preset CPAP/BiPAP Settings   Mode Of Delivery CPAP   $ CPAP/BiPAP Daily Charge BiPAP/CPAP Daily   $ Initial CPAP/BiPAP Setup? No   $ Is patient using? Yes   Size of Mask Small   Sized Appropriately? Yes   Equipment Type V60   CPAP (cm H2O) 5   Patient CPAP/BiPAP Settings   Timed Inspiration (Sec) 1   IPAP Rise Time (sec) 2   RR Total (Breaths/Min) 18   Tidal Volume (mL) 461   VE Minute Ventilation (L/min) 8 L/min   Peak Inspiratory Pressure (cm H2O) 5   TiTOT (%) 39   Total Leak (L/Min) 16   Patient Trigger - ST Mode Only (%) 100   CPAP/BiPAP Alarms   High Pressure (cm H2O) 20   Low Pressure (cm H2O) 4   Low Pressure Delay (Sec) 20   Minute Ventilation (L/Min) 3   High RR (breaths/min) 35   Low RR (breaths/min) 10

## 2023-03-12 NOTE — PROGRESS NOTES
"[facility]  Progress Note    Patient Name: Marian Dumont  MRN: [id number]  Patient Class: OP- Observation   Admission Date: 3/10/2023  Length of Stay: 0 days  Attending Physician: Sean اللعي MD  Primary Care Provider: Jonathan Tao MD        Subjective:     Principal Problem:Shortness of breath        HPI:  Marian Dumont is a 63 y.o. Black or  female   With PMH of ESRD on HD, DM2, HTN,  who presents with SOB.     Onset 3 days ago  Progressively worsening  Worse with exertion   +orthopnea  Pt hasn't been able to sleep in 3 days  She is compliant with HD  Today had 2L off  However she continues SOB  No CP--she reports chest pressure but only when she lays down  No JADE as of 2018, however she wasn't ESRD then, and now has PND and worsening snoring  No fevers     Pt taken directly for repeat HD from the ER      Overview/Hospital Course:  Marian Dumont is a 63 year old female with a past medical history of DM, obesity, ESRD, HTN, HLD, anemia and GERD who presented with shortness of breath thought to be secondary to progressive JADE/OHS. She had not missed an HD sessions and her clinical picture is not consistent with volume overload. Nephrology was consulted. Pulmonary was consulted and will order a sleep study in the outpatient setting as well as ABG and PFTs.      Interval History: see "Hospital Course"    Review of Systems   Respiratory:  Positive for shortness of breath.    Objective:     Vital Signs (Most Recent):  Temp: 98 °F (36.7 °C) (03/11/23 1930)  Pulse: 79 (03/11/23 1930)  Resp: 18 (03/11/23 1930)  BP: (!) 145/83 (03/11/23 1930)  SpO2: 97 % (03/11/23 1930)   Vital Signs (24h Range):  Temp:  [97.5 °F (36.4 °C)-98.6 °F (37 °C)] 98 °F (36.7 °C)  Pulse:  [78-87] 79  Resp:  [18] 18  SpO2:  [95 %-97 %] 97 %  BP: (111-145)/(45-83) 145/83     Weight: 135.9 kg (299 lb 9.6 oz)  Body mass index is 48.36 kg/m².    Intake/Output Summary (Last 24 hours) at " 3/11/2023 2136  Last data filed at 3/11/2023 1829  Gross per 24 hour   Intake 750 ml   Output --   Net 750 ml      Physical Exam  Vitals and nursing note reviewed.   Constitutional:       General: She is not in acute distress.     Appearance: She is obese.   HENT:      Head: Normocephalic and atraumatic.      Right Ear: External ear normal.      Left Ear: External ear normal.      Nose: Nose normal.      Mouth/Throat:      Mouth: Mucous membranes are moist.      Pharynx: Oropharynx is clear.   Eyes:      Extraocular Movements: Extraocular movements intact.      Conjunctiva/sclera: Conjunctivae normal.   Cardiovascular:      Rate and Rhythm: Normal rate and regular rhythm.      Pulses: Normal pulses.      Heart sounds: Normal heart sounds.      Comments: LUE AV fistula.  Pulmonary:      Effort: Pulmonary effort is normal. No respiratory distress.      Breath sounds: Normal breath sounds.   Abdominal:      General: There is no distension.      Palpations: Abdomen is soft.      Tenderness: There is no abdominal tenderness.   Musculoskeletal:         General: Normal range of motion.      Cervical back: Normal range of motion and neck supple.      Right lower leg: No edema.      Left lower leg: No edema.   Skin:     General: Skin is warm and dry.   Neurological:      General: No focal deficit present.      Mental Status: She is oriented to person, place, and time. Mental status is at baseline.   Psychiatric:         Mood and Affect: Mood normal.         Behavior: Behavior normal.       Significant Labs: All pertinent labs within the past 24 hours have been reviewed.    Significant Imaging: I have reviewed all pertinent imaging results/findings within the past 24 hours.      Assessment/Plan:      * Shortness of breath  JADE/OHS suspected.  -BiPAP QHS  -Pulmonary following      Anemia  In setting of ESRD. Stable.  -Trend Hgb with CBC      Morbid obesity  Body mass index is 48.36 kg/m². Morbid obesity complicates all aspects  of disease management from diagnostic modalities to treatment.         GERD (gastroesophageal reflux disease)  -PPI      Dyslipidemia  -Continue ASA and statin      Type 2 diabetes mellitus without complication, without long-term current use of insulin  Patient's FSGs are controlled on current medication regimen.  Last A1c reviewed-   Lab Results   Component Value Date    HGBA1C 7.4 (H) 03/11/2023     Most recent fingerstick glucose reviewed- No results for input(s): POCTGLUCOSE in the last 24 hours.  Current correctional scale  Medium  Maintain anti-hyperglycemic dose as follows-   Antihyperglycemics (From admission, onward)    Start     Stop Route Frequency Ordered    03/10/23 2032  insulin aspart U-100 pen 1-10 Units         -- SubQ Before meals & nightly PRN 03/10/23 1934        Hold Oral hypoglycemics while patient is in the hospital.    Primary hypertension  -Continue home medications    ESRD (end stage renal disease)  -HD per Nephrology        VTE Risk Mitigation (From admission, onward)         Ordered     heparin (porcine) injection 5,000 Units  Every 8 hours         03/10/23 1934     IP VTE HIGH RISK PATIENT  Once         03/10/23 1934     Place sequential compression device  Until discontinued         03/10/23 1934                Discharge Planning   JULIA:      Code Status: Full Code   Is the patient medically ready for discharge?:     Reason for patient still in hospital (select all that apply): Patient trending condition and Consult recommendations                     Sean العلي MD  Department of Hospital Medicine   [facility]

## 2023-03-12 NOTE — PLAN OF CARE
Patient cleared for discharge from case management standpoint.    Chart and discharge orders reviewed.  Patient discharged home with no further case management needs.       03/12/23 0854   Final Note   Assessment Type Final Discharge Note   Anticipated Discharge Disposition Home   What phone number can be called within the next 1-3 days to see how you are doing after discharge? [phone]   Post-Acute Status   Discharge Delays None known at this time

## 2023-03-12 NOTE — DISCHARGE SUMMARY
[facility]  Discharge Summary      Patient Name: Marian Dumont  MRN: [id number]  FORREST: [id number]  Patient Class: OP- Observation  Admission Date: 3/10/2023  Hospital Length of Stay: 0 days  Discharge Date and Time: No discharge date for patient encounter.  Attending Physician: Amadeo العلي MD   Discharging Provider: Amadeo العلي MD  Primary Care Provider: Jonathan Tao MD    Primary Care Team: Networked reference to record PCT     HPI:   Marian Dumont is a 63 y.o. Black or  female   With PMH of ESRD on HD, DM2, HTN,  who presents with SOB.     Onset 3 days ago  Progressively worsening  Worse with exertion   +orthopnea  Pt hasn't been able to sleep in 3 days  She is compliant with HD  Today had 2L off  However she continues SOB  No CP--she reports chest pressure but only when she lays down  No JADE as of 2018, however she wasn't ESRD then, and now has PND and worsening snoring  No fevers     Pt taken directly for repeat HD from the ER      * No surgery found *      Hospital Course:   Marian Dumont is a 63 year old female with a past medical history of DM, obesity, ESRD, HTN, HLD, anemia and GERD who presented with shortness of breath thought to be secondary to progressive JADE/OHS. She had not missed an HD sessions and her clinical picture was not consistent with volume overload. Nephrology was consulted and the patient received HD during her course. Pulmonary was consulted and will order a sleep study in the outpatient setting as well as ABG and PFTs. She was discharged 3/12/2023 and will follow up with Dr. Douglas of Pulmonary in the outpatient setting.       Goals of Care Treatment Preferences:  Code Status: Full Code      Consults:   Consults (From admission, onward)        Status Ordering Provider     Inpatient consult to Pulmonology  Once        Provider:  Kendall Duoglas MD    Completed AMADEO العلي     Inpatient consult to Internal Medicine  Once         Provider:  Sima Nelson MD    Acknowledged SIMA NELSON     Inpatient consult to Nephrology  Once        Provider:  Lorne Gómez MD    Completed ASIA FOURNIER          Pulmonary  * Shortness of breath  JADE/OHS suspected.  -BiPAP QHS  -Pulmonary following; follow up in clinic  -Sleep study outpatient      Cardiac/Vascular  Dyslipidemia  -Continue ASA and statin      Primary hypertension  -Continue home medications    Renal/  ESRD (end stage renal disease)  -HD per Nephrology      Oncology  Anemia  In setting of ESRD. Stable.  -Trend Hgb with CBC      Endocrine  Morbid obesity  Body mass index is 48.36 kg/m². Morbid obesity complicates all aspects of disease management from diagnostic modalities to treatment.         Type 2 diabetes mellitus without complication, without long-term current use of insulin  Patient's FSGs are controlled on current medication regimen.  Last A1c reviewed-   Lab Results   Component Value Date    HGBA1C 7.4 (H) 03/11/2023     Most recent fingerstick glucose reviewed- No results for input(s): POCTGLUCOSE in the last 24 hours.  Current correctional scale  Medium  Maintain anti-hyperglycemic dose as follows-   Antihyperglycemics (From admission, onward)    Start     Stop Route Frequency Ordered    03/10/23 2032  insulin aspart U-100 pen 1-10 Units         -- SubQ Before meals & nightly PRN 03/10/23 1934        Hold Oral hypoglycemics while patient is in the hospital.    GI  GERD (gastroesophageal reflux disease)  -PPI        Final Active Diagnoses:    Diagnosis Date Noted POA    PRINCIPAL PROBLEM:  Shortness of breath [R06.02] 03/10/2023 Yes    Anemia [D64.9] 03/11/2023 Yes    ESRD (end stage renal disease) [N18.6] 01/08/2023 Yes    Primary hypertension [I10] 01/08/2023 Yes    Type 2 diabetes mellitus without complication, without long-term current use of insulin [E11.9] 01/08/2023 Yes    GERD (gastroesophageal reflux disease) [K21.9] 10/24/2022 Yes    Dyslipidemia  [E78.5] 12/21/2021 Yes    Morbid obesity [E66.01] 08/30/2021 Yes      Problems Resolved During this Admission:       Discharged Condition: stable    Disposition: Home or Self Care    Follow Up:   Follow-up Information     Kendall Douglas MD Follow up.    Specialty: Pulmonary Disease  Contact information:  [address]  [place] [address]  [phone]             Jonathan Tao MD Follow up.    Specialty: Internal Medicine  Contact information:  [address] [facility]  [address]  [place] [address]  [phone]                       Patient Instructions:      Diet diabetic     Diet renal     Notify your health care provider if you experience any of the following:  increased confusion or weakness     Notify your health care provider if you experience any of the following:  persistent dizziness, light-headedness, or visual disturbances     Notify your health care provider if you experience any of the following:  severe persistent headache     Notify your health care provider if you experience any of the following:  difficulty breathing or increased cough     Notify your health care provider if you experience any of the following:  severe uncontrolled pain     Notify your health care provider if you experience any of the following:  persistent nausea and vomiting or diarrhea     Notify your health care provider if you experience any of the following:  temperature >100.4     Activity as tolerated       Significant Diagnostic Studies: Labs: All labs within the past 24 hours have been reviewed    Pending Diagnostic Studies:     Procedure Component Value Units Date/Time    Basic Metabolic Panel (BMP) [[id number]]     Order Status: Sent Lab Status: No result     Specimen: Blood     CBC with Automated Differential [[id number]]     Order Status: Sent Lab Status: No result     Specimen: Blood     Hepatitis B core antibody, total [[id number]] Collected: 03/11/23 0051    Order Status: Sent Lab Status: In process Updated: 03/11/23  0055    Specimen: Blood     Hepatitis B surface antibody [[id number]] Collected: 03/11/23 0051    Order Status: Sent Lab Status: In process Updated: 03/11/23 0055    Specimen: Blood     Hepatitis B surface antigen [[id number]] Collected: 03/11/23 0051    Order Status: Sent Lab Status: In process Updated: 03/11/23 0055    Specimen: Blood     Magnesium [[id number]]     Order Status: Sent Lab Status: No result     Specimen: Blood     Phosphorus [[id number]]     Order Status: Sent Lab Status: No result     Specimen: Blood          Medications:  Reconciled Home Medications:      Medication List      CHANGE how you take these medications    ondansetron 4 MG tablet  Commonly known as: ZOFRAN  Take 1 tablet (4 mg total) by mouth every 6 (six) hours as needed for Nausea.  What changed:   · when to take this  · reasons to take this        CONTINUE taking these medications    amLODIPine 5 MG tablet  Commonly known as: NORVASC  Take 5 mg by mouth 2 (two) times daily.     amoxicillin-clavulanate 500-125mg 500-125 mg Tab  Commonly known as: AUGMENTIN  Take 1 tablet by mouth 2 (two) times daily.     aspirin 81 MG EC tablet  Commonly known as: ECOTRIN  Take 81 mg by mouth once daily.     calcitRIOL 0.25 MCG Cap  Commonly known as: ROCALTROL  Take 0.25 mcg by mouth once daily.     carvediloL 12.5 MG tablet  Commonly known as: COREG  Take 12.5 mg by mouth 2 (two) times daily with meals.     cetirizine 10 MG tablet  Commonly known as: ZYRTEC  Take 10 mg by mouth once daily.     cinacalcet 30 MG Tab  Commonly known as: SENSIPAR  Take 30 mg by mouth Daily.     fluticasone propionate 50 mcg/actuation nasal spray  Commonly known as: FLONASE  1 spray by Each Nostril route 2 (two) times a day.     ipratropium 42 mcg (0.06 %) nasal spray  Commonly known as: ATROVENT  2 sprays by Each Nostril route 2 (two) times daily.     LIDOcaine-prilocaine cream  Commonly known as: EMLA  Apply 1 each topically as needed.     omeprazole 40 MG capsule  Commonly  known as: PRILOSEC  Take 40 mg by mouth once daily.     ONGLYZA 2.5 mg Tab tablet  Generic drug: SAXagliptin  Take 2.5 mg by mouth once daily.     rosuvastatin 10 MG tablet  Commonly known as: CRESTOR  Take 10 mg by mouth once daily.            Indwelling Lines/Drains at time of discharge:   Lines/Drains/Airways     Drain  Duration                Hemodialysis AV Fistula Left upper arm -- days                Time spent on the discharge of patient: 31 minutes         Sean العلي MD  Department of Hospital Medicine  [facility]

## 2023-03-12 NOTE — HOSPITAL COURSE
Marian Dumont is a 63 year old female with a past medical history of DM, obesity, ESRD, HTN, HLD, anemia and GERD who presented with shortness of breath thought to be secondary to progressive JADE/OHS. She had not missed an HD sessions and her clinical picture was not consistent with volume overload. Nephrology was consulted and the patient received HD during her course. Pulmonary was consulted and will order a sleep study in the outpatient setting as well as ABG and PFTs. She was discharged 3/12/2023 and will follow up with Dr. Douglas of Pulmonary in the outpatient setting.

## 2023-03-12 NOTE — ASSESSMENT & PLAN NOTE
Patient's FSGs are controlled on current medication regimen.  Last A1c reviewed-   Lab Results   Component Value Date    HGBA1C 7.4 (H) 03/11/2023     Most recent fingerstick glucose reviewed- No results for input(s): POCTGLUCOSE in the last 24 hours.  Current correctional scale  Medium  Maintain anti-hyperglycemic dose as follows-   Antihyperglycemics (From admission, onward)    Start     Stop Route Frequency Ordered    03/10/23 2032  insulin aspart U-100 pen 1-10 Units         -- SubQ Before meals & nightly PRN 03/10/23 1934        Hold Oral hypoglycemics while patient is in the hospital.

## 2023-03-12 NOTE — ASSESSMENT & PLAN NOTE
JADE/OHS suspected.  -BiPAP QHS  -Pulmonary following; follow up in clinic  -Sleep study outpatient

## 2023-03-12 NOTE — HPI
Marian Dumont is a 63 y.o. Black or  female   With PMH of ESRD on HD, DM2, HTN,  who presents with SOB.     Onset 3 days ago  Progressively worsening  Worse with exertion   +orthopnea  Pt hasn't been able to sleep in 3 days  She is compliant with HD  Today had 2L off  However she continues SOB  No CP--she reports chest pressure but only when she lays down  No JADE as of 2018, however she wasn't ESRD then, and now has PND and worsening snoring  No fevers     Pt taken directly for repeat HD from the ER

## 2023-03-12 NOTE — ASSESSMENT & PLAN NOTE
Body mass index is 48.36 kg/m². Morbid obesity complicates all aspects of disease management from diagnostic modalities to treatment.

## 2023-03-13 ENCOUNTER — TELEPHONE (OUTPATIENT)
Dept: PULMONOLOGY | Facility: CLINIC | Age: 63
End: 2023-03-13
Payer: MEDICARE

## 2023-03-13 LAB
HBV CORE AB SERPL QL IA: NEGATIVE
HBV SURFACE AB SER QL: NON REACTIVE
HBV SURFACE AG SERPL QL IA: NEGATIVE

## 2023-03-13 NOTE — TELEPHONE ENCOUNTER
----- Message from Kendall Douglas MD sent at 3/11/2023  1:33 PM CST -----  Regarding: JADE OHS new patient visit  Hello,    Please schedule Ms. Dumont for a new patient visit with me in 4 weeks.    Thank you,  THUAN

## 2023-03-15 LAB
BACTERIA BLD CULT: NORMAL
BACTERIA BLD CULT: NORMAL

## 2023-03-27 ENCOUNTER — PROCEDURE VISIT (OUTPATIENT)
Dept: SLEEP MEDICINE | Facility: HOSPITAL | Age: 63
End: 2023-03-27
Attending: INTERNAL MEDICINE
Payer: MEDICARE

## 2023-03-27 DIAGNOSIS — G47.33 OSA (OBSTRUCTIVE SLEEP APNEA): ICD-10-CM

## 2023-03-27 PROCEDURE — 95810 POLYSOM 6/> YRS 4/> PARAM: CPT

## 2023-04-10 DIAGNOSIS — G47.33 OSA (OBSTRUCTIVE SLEEP APNEA): Primary | ICD-10-CM

## 2023-04-12 ENCOUNTER — PROCEDURE VISIT (OUTPATIENT)
Dept: SLEEP MEDICINE | Facility: HOSPITAL | Age: 63
End: 2023-04-12
Attending: INTERNAL MEDICINE
Payer: MEDICARE

## 2023-04-12 DIAGNOSIS — G47.33 OSA (OBSTRUCTIVE SLEEP APNEA): ICD-10-CM

## 2023-04-12 PROCEDURE — 95811 POLYSOM 6/>YRS CPAP 4/> PARM: CPT

## 2023-04-14 ENCOUNTER — TELEPHONE (OUTPATIENT)
Dept: PULMONOLOGY | Facility: CLINIC | Age: 63
End: 2023-04-14
Payer: MEDICARE

## 2023-04-14 NOTE — TELEPHONE ENCOUNTER
Sent message to MD, regarding needing new DX code for PFT   ----- Message from Maggi Pradhan sent at 4/11/2023 10:03 AM CDT -----  Regarding: Please addend order with updated Payable DX Codes  The current dx code for this patient's PFT is non-payable. (JADE (obstructive sleep apnea) [G47.33])    We need new codes added to the order so that the patient's insurance will cover the test.     Without a payable diagnosis, the patient will need to pay a cash price.     To add a new diagnosis, please addend the current order and add the new diagnosis. Please let us know if you have any questions.     Thanks, Maggi  [facility] Centralized Scheduling  [phone]

## 2023-04-18 ENCOUNTER — HOSPITAL ENCOUNTER (OUTPATIENT)
Dept: PULMONOLOGY | Facility: HOSPITAL | Age: 63
Discharge: HOME | End: 2023-04-18
Attending: INTERNAL MEDICINE
Payer: MEDICARE

## 2023-04-18 DIAGNOSIS — R06.00 DYSPNEA, UNSPECIFIED TYPE: ICD-10-CM

## 2023-04-18 PROCEDURE — 94010 BREATHING CAPACITY TEST: CPT

## 2023-04-18 PROCEDURE — 94727 GAS DIL/WSHOT DETER LNG VOL: CPT

## 2023-04-20 ENCOUNTER — OFFICE VISIT (OUTPATIENT)
Dept: PULMONOLOGY | Facility: CLINIC | Age: 63
End: 2023-04-20
Payer: MEDICARE

## 2023-04-20 VITALS
DIASTOLIC BLOOD PRESSURE: 81 MMHG | HEART RATE: 86 BPM | OXYGEN SATURATION: 95 % | WEIGHT: 293 LBS | SYSTOLIC BLOOD PRESSURE: 164 MMHG | BODY MASS INDEX: 47.09 KG/M2 | HEIGHT: 66 IN

## 2023-04-20 DIAGNOSIS — E66.2 OBESITY HYPOVENTILATION SYNDROME: ICD-10-CM

## 2023-04-20 DIAGNOSIS — G47.33 OBSTRUCTIVE SLEEP APNEA SYNDROME: Primary | ICD-10-CM

## 2023-04-20 PROCEDURE — 99999 PR PBB SHADOW E&M-EST. PATIENT-LVL IV: ICD-10-PCS | Mod: PBBFAC,,, | Performed by: INTERNAL MEDICINE

## 2023-04-20 PROCEDURE — 99214 OFFICE O/P EST MOD 30 MIN: CPT | Mod: PBBFAC,PO | Performed by: INTERNAL MEDICINE

## 2023-04-20 PROCEDURE — 99214 PR OFFICE/OUTPT VISIT, EST, LEVL IV, 30-39 MIN: ICD-10-PCS | Mod: S$PBB,,, | Performed by: INTERNAL MEDICINE

## 2023-04-20 PROCEDURE — 99214 OFFICE O/P EST MOD 30 MIN: CPT | Mod: S$PBB,,, | Performed by: INTERNAL MEDICINE

## 2023-04-20 PROCEDURE — 99999 PR PBB SHADOW E&M-EST. PATIENT-LVL IV: CPT | Mod: PBBFAC,,, | Performed by: INTERNAL MEDICINE

## 2023-04-20 RX ORDER — ESZOPICLONE 3 MG/1
3 TABLET, FILM COATED ORAL NIGHTLY
Qty: 14 TABLET | Refills: 0 | Status: SHIPPED | OUTPATIENT
Start: 2023-04-20 | End: 2023-04-20

## 2023-04-20 RX ORDER — ESZOPICLONE 3 MG/1
3 TABLET, FILM COATED ORAL NIGHTLY
Qty: 14 TABLET | Refills: 0 | Status: SHIPPED | OUTPATIENT
Start: 2023-04-20 | End: 2023-05-20

## 2023-04-20 NOTE — PROGRESS NOTES
Pulmonary Clinic New Patient    HISTORY OF PRESENT ILLNESS   Marian Dumont is a 63 y.o. female with a PMH of ADPKD, ESRD, HTN, and DM2 on whom we have been consulted for possible chronic hypercapnic respiratory failure.    I saw her inpatient at [facility] on 3/11/23 while she was admitted for SOB. She got dialysis right at admission then.          SMOKING HISTORY  Never smoker.     EXPOSURE HISTORY  None.       REVIEW OF SYSTEMS  GENERAL: Feeling well. No fevers, chills, or night sweats.  EYES: Vision is good.  ENT: No sinusitis or pharyngitis.   HEART: No chest pain or palpitations.Endorses orthopnea that is worse on dialysis days.  LUNGS: Daily cough productive of clear sputum.  GI: No abdominal pain, nausea, vomiting, or diarrhea.  : No dysuria, urgency, or frequency.  SKIN: No lesions or rashes.  MUSCULOSKELETAL: No joint pain or myalgias.  NEURO: No headaches or neuropathy.  LYMPH: No edema or adenopathy.  PSYCH: No anxiety or depression.  ENDO: No unexpected weight change.    PFSH:  Past Medical History:   Diagnosis Date    COVID-19     Diabetes mellitus     Dialysis patient     Hypertension     Mixed hyperlipidemia     Obese body habitus          No past surgical history on file.     No family history on file.  Review of patient's allergies indicates:   Allergen Reactions    Ace inhibitors          VITAL SIGNS (MOST RECENT)       PHYSICAL EXAM  GENERAL: NAD.  HEENT: Pupils equal and reactive. Extraocular movements intact.   NECK: Supple.   HEART: Regular rate and rhythm. No murmur or gallop auscultated.  LUNGS: Clear to auscultation and percussion. Lung excursion symmetrical.  ABDOMEN: Bowel sounds present. Non-tender, no masses palpated.  EXTREMITIES: Normal muscle tone and joint movement, no cyanosis or clubbing.   LYMPHATICS: Trace pretibial pitting edema bilaterally.  SKIN: Dry, intact, no lesions.   NEURO: No gross cognitive or motor deficits.  PSYCH: Appropriate affect.    Lab Results   Component Value  Date    WBC 8.74 03/11/2023    HGB 10.4 (L) 03/11/2023    HCT 33.0 (L) 03/11/2023     03/11/2023    ALT 10 03/10/2023    AST 13 03/10/2023     03/11/2023    K 4.6 03/11/2023    CL 95 03/11/2023    CREATININE 8.9 (H) 03/11/2023    BUN 42 (H) 03/11/2023    CO2 28 03/11/2023    TSH 3.900 03/10/2023    INR 1.0 03/10/2023    HGBA1C 7.4 (H) 03/11/2023       LAST ARTERIAL BLOOD GAS  @LABRCNTIP[PH,PO2,PCO2,HCO3,BE@      LAST 7 DAYS MICROBIOLOGY   Microbiology Results (last 7 days)       ** No results found for the last 168 hours. **            MOST RECENT IMAGING  CTA Chest Non-Coronary (PE Studies)  EXAM: CTA CHEST NON CORONARY (PE STUDIES)    HISTORY: Pulmonary embolism (PE) suspected, high prob    COMPARISON: CTA of the chest dated 1/7/2023    TECHNIQUE: Spiral CT images were obtained through the chest during rapid IV injection of contrast and were reconstructed in 1 mm thick axial slices. Multiple coronal and sagittal MIP reconstructions were obtained.    This exam was performed according to our departmental dose-optimization program, which includes automated exposure control, adjustment of the mA and/or kV according to patient size and/or use of iterative reconstruction technique.    FINDINGS: The main pulmonary arteries and their lobar and segmental branches are fairly well opacified, and demonstrate no filling defects. There is no CT evidence of acute pulmonary thromboembolism. Note that small distal emboli cannot be excluded by this study. The thoracic aorta was also well-opacified and appears within normal limits. The heart is moderately enlarged. There is no mediastinal or hilar or axillary lymphadenopathy. Lung window images demonstrate no parenchymal infiltrates or discrete lung masses. There are no pleural effusions. Images through the upper abdomen demonstrate innumerable cysts distributed throughout the liver that were much better evaluated on the recent previous CT scan of the abdomen dated  1/10/2023.    IMPRESSION:   Negative CTA of the chest with PE protocol. There is no CT evidence of pulmonary embolism or other acute process. There is moderate cardiomegaly.    Electronically signed by:  Nakul Shanks MD  3/10/2023 6:30 PM CST Workstation: [id number]  X-Ray Chest AP Portable  Reason: Shortness of breath COVID, SOB    FINDINGS:  Portable chest at 1421 compared with 1/7/2023 shows enlarged cardiac silhouette size with normal mediastinal contours. Left axillary vascular stent unchanged.    Lungs are clear. Pulmonary vasculature is normal. No acute osseous abnormality.    IMPRESSION:  Unchanged cardiomegaly without acute cardiopulmonary abnormality.    Electronically signed by:  Varun Marinelli MD  3/10/2023 2:43 PM CST Workstation: [id number]      CURRENT VISIT EKG  No results found for this visit on 04/20/23.    ECHOCARDIOGRAM RESULTS  Results for orders placed during the hospital encounter of 01/07/23    Echo    Interpretation Summary  · The left ventricle is normal in size with moderate concentric hypertrophy and normal systolic function.  · Moderate left atrial enlargement.  · The estimated ejection fraction is 65%.  · Normal left ventricular diastolic function.  · Mild mitral regurgitation.  · Mild right atrial enlargement.  · Normal right ventricular size with normal right ventricular systolic function.  · Normal central venous pressure (3 mmHg).      Pulmonary Function Tests    Per my personal interpretation, these PFTs show no obstruction, mild restriction likely due to body habitus, and low diffusion capacity that corrects when adjusted for Va. There is not likely any intrinsic lung disease.    I have personally reviewed recent labs and ABGs, and I have viewed the images of recent chest x-rays and chest CTs.    Her recent ABG (4/18/23) shows a mild metabolic alkalosis, as well as a(n over)compensated respiratory acidosis. This is consistent with a chronic respiratory acidosis. The  overcompensation is likely due to the heavy breathing for PFTs that were performed immediately beforehand.    ASSESSMENT & PLAN   Marian Dumotn is a 63 y.o. female with a PMH of ESRD, HTN, and DM2 on whom we have been consulted for shortness of breath and concern for JADE/OHS.    #JADE  #OHS  #Chronic hypercapnic respiratory failure  #Very severe obesity  - will order CPAP when titration results are released  - patient dissatisfied with the 2 types of mask she tried  - eszopiclone 3 mg nightly x 14 days when starting CPAP to improve likelihood of adherence      No follow-ups on file.  Follow up in 4 months.    Kendall Douglas MD  Pulmonary and Critical Care Medicine  [facility]  Date of Service: 04/20/2023  2:36 PM

## 2023-04-28 ENCOUNTER — TELEPHONE (OUTPATIENT)
Dept: PULMONOLOGY | Facility: CLINIC | Age: 63
End: 2023-04-28
Payer: MEDICARE

## 2023-04-28 NOTE — TELEPHONE ENCOUNTER
Spoke to patient.  She stated that she hadn't heard from anyone about the CPAP machine.  I gave her the number for [facility].  Verbalized understanding.

## 2023-04-28 NOTE — TELEPHONE ENCOUNTER
----- Message from Anay Lay sent at 4/28/2023  2:15 PM CDT -----  Type: Needs Medical Advice  Who Called:  Pts daughter Kimberlee  Best Call Back Number: [phone]  Additional Information: Pts daughter is calling about a cpap machine that was requested, please call back to advise thanks

## 2023-05-01 RX ORDER — ESZOPICLONE 3 MG/1
3 TABLET, FILM COATED ORAL NIGHTLY
Qty: 14 TABLET | Refills: 0 | Status: CANCELLED | OUTPATIENT
Start: 2023-05-01 | End: 2023-05-31

## 2023-05-02 ENCOUNTER — TELEPHONE (OUTPATIENT)
Dept: PULMONOLOGY | Facility: CLINIC | Age: 63
End: 2023-05-02
Payer: MEDICARE

## 2023-05-02 DIAGNOSIS — G47.33 OSA (OBSTRUCTIVE SLEEP APNEA): Primary | ICD-10-CM

## 2023-05-02 NOTE — TELEPHONE ENCOUNTER
Message sent to provider to place order for CPAP machine.     ----- Message from Ivette Rodriguez sent at 5/2/2023 11:41 AM CDT -----  Contact: Pt's daughter More @ [phone]  Type:  Needs Medical Advice    Who Called: Pt's daughter/ More Lopes  Would the patient rather a call back or a response via [facility]? Call   Best Call Back Number: [phone]  Additional Information: Pt's daughter states that a CPAP machine was ordered for her mom, and she hasn't received it yet. The supplier told her that there isn't an order for pt to get a machine. Please call pt's daughter back to advise.

## 2023-06-01 ENCOUNTER — OFFICE VISIT (OUTPATIENT)
Dept: PRIMARY CARE CLINIC | Facility: CLINIC | Age: 63
End: 2023-06-01
Payer: MEDICARE

## 2023-06-01 VITALS
BODY MASS INDEX: 47.09 KG/M2 | TEMPERATURE: 99 F | DIASTOLIC BLOOD PRESSURE: 60 MMHG | HEART RATE: 86 BPM | HEIGHT: 66 IN | SYSTOLIC BLOOD PRESSURE: 128 MMHG | RESPIRATION RATE: 20 BRPM | OXYGEN SATURATION: 96 % | WEIGHT: 293 LBS

## 2023-06-01 DIAGNOSIS — Z11.4 SCREENING FOR HIV (HUMAN IMMUNODEFICIENCY VIRUS): ICD-10-CM

## 2023-06-01 DIAGNOSIS — H92.12 EAR DRAINAGE, LEFT: ICD-10-CM

## 2023-06-01 DIAGNOSIS — Z78.0 MENOPAUSE: ICD-10-CM

## 2023-06-01 DIAGNOSIS — N18.6 ESRD (END STAGE RENAL DISEASE): ICD-10-CM

## 2023-06-01 DIAGNOSIS — Z12.31 ENCOUNTER FOR SCREENING MAMMOGRAM FOR MALIGNANT NEOPLASM OF BREAST: ICD-10-CM

## 2023-06-01 DIAGNOSIS — Z76.89 ENCOUNTER TO ESTABLISH CARE: Primary | ICD-10-CM

## 2023-06-01 DIAGNOSIS — E11.9 TYPE 2 DIABETES MELLITUS WITHOUT COMPLICATION, WITHOUT LONG-TERM CURRENT USE OF INSULIN: ICD-10-CM

## 2023-06-01 DIAGNOSIS — Z12.12 SCREENING FOR COLORECTAL CANCER: ICD-10-CM

## 2023-06-01 DIAGNOSIS — Q61.2 ADPKD (AUTOSOMAL DOMINANT POLYCYSTIC KIDNEY): ICD-10-CM

## 2023-06-01 DIAGNOSIS — I31.39 PERICARDIAL EFFUSION: ICD-10-CM

## 2023-06-01 DIAGNOSIS — E66.01 MORBID OBESITY: ICD-10-CM

## 2023-06-01 DIAGNOSIS — Z12.11 SCREENING FOR COLORECTAL CANCER: ICD-10-CM

## 2023-06-01 DIAGNOSIS — D64.9 ANEMIA, UNSPECIFIED TYPE: ICD-10-CM

## 2023-06-01 DIAGNOSIS — K76.9 LIVER LESION: ICD-10-CM

## 2023-06-01 DIAGNOSIS — K21.9 GASTROESOPHAGEAL REFLUX DISEASE, UNSPECIFIED WHETHER ESOPHAGITIS PRESENT: ICD-10-CM

## 2023-06-01 DIAGNOSIS — Z11.59 NEED FOR HEPATITIS C SCREENING TEST: ICD-10-CM

## 2023-06-01 DIAGNOSIS — Z01.419 ROUTINE GYNECOLOGICAL EXAMINATION: ICD-10-CM

## 2023-06-01 DIAGNOSIS — I10 PRIMARY HYPERTENSION: ICD-10-CM

## 2023-06-01 DIAGNOSIS — Z12.39 ENCOUNTER FOR SCREENING FOR MALIGNANT NEOPLASM OF BREAST, UNSPECIFIED SCREENING MODALITY: ICD-10-CM

## 2023-06-01 DIAGNOSIS — E78.5 DYSLIPIDEMIA: ICD-10-CM

## 2023-06-01 PROCEDURE — 99215 OFFICE O/P EST HI 40 MIN: CPT | Mod: PBBFAC,PN,25 | Performed by: INTERNAL MEDICINE

## 2023-06-01 PROCEDURE — 90677 PCV20 VACCINE IM: CPT | Mod: PBBFAC,PN

## 2023-06-01 PROCEDURE — 99999 PR PBB SHADOW E&M-EST. PATIENT-LVL V: CPT | Mod: PBBFAC,,, | Performed by: INTERNAL MEDICINE

## 2023-06-01 PROCEDURE — 99205 PR OFFICE/OUTPT VISIT, NEW, LEVL V, 60-74 MIN: ICD-10-PCS | Mod: S$PBB,,, | Performed by: INTERNAL MEDICINE

## 2023-06-01 PROCEDURE — 99205 OFFICE O/P NEW HI 60 MIN: CPT | Mod: S$PBB,,, | Performed by: INTERNAL MEDICINE

## 2023-06-01 PROCEDURE — 99999 PR PBB SHADOW E&M-EST. PATIENT-LVL V: ICD-10-PCS | Mod: PBBFAC,,, | Performed by: INTERNAL MEDICINE

## 2023-06-01 NOTE — PROGRESS NOTES
[facility] INITIAL VISIT NOTE      CHIEF COMPLAINT     Chief Complaint   Patient presents with    Establish Care     Patient is here to establish care with Dr. Majano.        HPI     Marian Dumont is a 63 y.o.  female who presents with a PMHx of  EDRD on HD, DM2, HLD, HTN, anemia, GERD, Morbid obesity, who presents today to establish care.   Her main complaints and concerns today are:     Discussed her HD and why shes not on a transplant list. Says they told her she has on too much weight. Will refer her for second opinion and we had a veryvery long discussion re weight loss: needs to go for walks daily, join weight watchers etc.     ESRD on HD: has dialysis on days M/W/F.     DM2: on ongyza. Last hgb a1c was 7.4 and not at goal.   Foot exm was done today  Last eyee exam was scheduled for today after this appointment     January 2019 had monty holes with drain of brain bleed     Perdicardial effusion: pericardial window with abelardo.       CHRONIC HEALTH ISSUES:   Past Medical History:  Past Medical History:   Diagnosis Date    Anemia     Coronary artery disease     COVID-19     Diabetes mellitus     Diabetes mellitus, type 2     Dialysis patient     Disorder of kidney and ureter     GERD (gastroesophageal reflux disease)     Hypertension     Mixed hyperlipidemia     Obese body habitus          Geriatric Assessment    ADLs : needs assistance with things like driving  iADLs: needs assistance with things like driving    Polypharmacy:yes     Visual impairments: no change     Hearing impairment: no changes to her hearing     Urinary incontinence: yes     Malnutrition: no     Gait/balance/falls: no falls in the past 6 months     Depression: PHQ2. No depression     Sleep: doesn't sleep more than 5 hours     Cognitive Problems: minicog.5/5.     Environmental/social problems:     Functional status:     Support system:     Advanced care planning:  does not have on file. But discussed at length today and she  will fill out the paperwork and return it to clinic to be scanned in.    Date: 2023    Power of   I initiated the process of advance care planning today and explained the importance of this process to the patient.  I introduced the concept of advance directives to the patient, as well. Then the patient received detailed information about the importance of designating a Health Care Power of  (HCPOA). She was also instructed to communicate with this person about their wishes for future healthcare, should she become sick and lose decision-making capacity. The patient has not previously appointed a HCPOA. After our discussion, the patient has decided to complete a HCPOA and has appointed her daughter, health care agent:  on file  & health care agent number:  on file   I spent a total time of 10 minutes discussing this issue with the patient.            AWV last done over a year. Ordered today      Worry score 4,HD, pericardial effusion, brain on the fluid     Past Surgical History:  Past Surgical History:   Procedure Laterality Date    BRAIN SURGERY       SECTION      CHOLECYSTECTOMY      PERICARDIAL WINDOW         Allergies:  Review of patient's allergies indicates:   Allergen Reactions    Ace inhibitors Anaphylaxis and Swelling     Other reaction(s): Angioedema         Home Medications:  Prior to Admission medications    Medication Sig Start Date End Date Taking? Authorizing Provider   amLODIPine (NORVASC) 5 MG tablet Take 5 mg by mouth 2 (two) times daily.    Historical Provider   amoxicillin-clavulanate 500-125mg (AUGMENTIN) 500-125 mg Tab Take 1 tablet by mouth 2 (two) times daily. 3/6/23   Historical Provider   aspirin (ECOTRIN) 81 MG EC tablet Take 81 mg by mouth once daily.    Historical Provider   calcitRIOL (ROCALTROL) 0.25 MCG Cap Take 0.25 mcg by mouth once daily.    Historical Provider   carvediloL (COREG) 12.5 MG tablet Take 12.5 mg by mouth 2 (two) times daily with meals.     Historical Provider   cetirizine (ZYRTEC) 10 MG tablet Take 10 mg by mouth once daily.    Historical Provider   cinacalcet (SENSIPAR) 30 MG Tab Take 30 mg by mouth Daily. 1/18/23   Historical Provider   fluticasone propionate (FLONASE) 50 mcg/actuation nasal spray 1 spray by Each Nostril route 2 (two) times a day. 11/21/22   Historical Provider   ipratropium (ATROVENT) 42 mcg (0.06 %) nasal spray 2 sprays by Each Nostril route 2 (two) times daily. 1/16/23   Historical Provider   LIDOcaine-prilocaine (EMLA) cream Apply 1 each topically as needed. 1/26/23   Historical Provider   omeprazole (PRILOSEC) 40 MG capsule Take 40 mg by mouth once daily.    Historical Provider   ondansetron (ZOFRAN) 4 MG tablet Take 1 tablet (4 mg total) by mouth every 6 (six) hours as needed for Nausea. 3/12/23   Sean العلي MD   rosuvastatin (CRESTOR) 10 MG tablet Take 10 mg by mouth once daily.    Historical Provider   SAXagliptin (ONGLYZA) 2.5 mg Tab tablet Take 2.5 mg by mouth once daily.    Historical Provider       Family History:  Family History   Problem Relation Age of Onset    Heart disease Mother     Diabetes Mother     COPD Mother     Cancer Mother         lung CA    Heart disease Father     Stroke Father         cerebral aneurysm    Early death Father     Diabetes Sister     COPD Sister     Cancer Sister         lung CA    Depression Daughter     Cancer Maternal Aunt         colon CA       Social History:  Social History     Tobacco Use    Smoking status: Never    Smokeless tobacco: Never   Substance Use Topics    Alcohol use: Never    Drug use: Never       Review of Systems:  ROS    Health Maintainence: doesn't normally get vaccines. Agreed to PNA today  TDap is not up to date, Influenza is not up to date   Pneumovax is not up to date.   Zostavax is not UTD.       Covid is not UTD  Cancer Screening:  PAP: is not up to date. Ordered today   Mammogram: is not up to date. Ordered today   Colonoscopy: is not up to date. Ordered  "today   DEXA:  is not up to date. orderedtoday  Screening:  Hepatitis C is not up to date in pts born between 2721-5621. **    PHYSICAL EXAM     /60 (BP Location: Right arm, Patient Position: Sitting, BP Method: Large (Manual))   Pulse 86   Temp 98.6 °F (37 °C) (Oral)   Resp 20   Ht 5' 6" (1.676 m)   Wt (!) 137.9 kg (304 lb 0.2 oz)   SpO2 96%   BMI 49.07 kg/m²     GEN - A+OX4, NAD   HEENT - PERRL, EOMI, OP clear. MMM.   Neck - No thyromegaly or cervical LAD. No thyroid masses felt.  CV - RRR, no m/r   Chest - CTAB, no wheezing or rhonchi  Abd - S/NT/ND/+BS.   Ext - 2+BDP and radial pulses. No LE edema.   Neuro - PERRL, EOMI, no nystagmus, eyebrow raise, facial sensation, hearing, m of mastication, smile, palatal raise, shoulder shrug, tongue protrusion symmetric and intact. 5/5 BUE and BLE strength. Sensation to light touch intact throughout. 2+ DTRs. Normal gait.   MSK - No spinal tenderness to palpation. Normal gait.   Skin - No rash.  Protective Sensation (w/ 10 gram monofilament):  Right: Intact  Left: Intact    Visual Inspection:  Normal -  Bilateral    Pedal Pulses:   Right: Present  Left: Present    Posterior Tibialis Pulses:   Right:Present  Left: Present     LABS     Previous labs reviewed.      ASSESSMENT/PLAN     Marian Dumont is a 63 y.o. female with  1. Encounter to establish care  -medications reviewed and consolidated  -reviewed PMH, medications, HCM including vaccinations.   -reviewed most recent lab work. Reordered as needed. Discussed abnormalities with patient with time allowed for questions.   -reviewed clinic policy with patient including to arrive 15 mins before appointments, labs and results including expected turn around for results.   -outside records and consultants notes reviewed as time allowed. Updated treatment team with name of other providers.   -reviewed and confirmed patients contact information, preferred pharmacy etc.   -AVS provided after visit to summarized things " discussed.   -The following assessments were completed:  Living Situation  CAGE  Depression Screening  Timed Get Up and Go  Cognitive Function Screening-Minicog   Nutrition Screening  ADL Screening      2. Primary hypertension  Overview:  BP well controlled today  Continue with current regimen       3. Dyslipidemia  Overview:  Well controlled  Continue current management  Advised wieght loss      Orders:  -     LIPID PANEL; Future; Expected date: 06/01/2023    4. ESRD (end stage renal disease)  Overview:  ON HD.   Referral to cardiology. High risk for CVD.   Follows with nephrology  Now having itching     Orders:  -     Ambulatory referral/consult to Transplant, Kidney; Future; Expected date: 06/08/2023    5. ADPKD (autosomal dominant polycystic kidney)  Overview:  On HD     Orders:  -     Ambulatory referral/consult to Transplant, Kidney; Future; Expected date: 06/08/2023    6. Anemia, unspecified type  Overview:  Due to ESRD      7. Type 2 diabetes mellitus without complication, without long-term current use of insulin  Overview:  Not at goal. Discussed lowering carb intake or will add medication in 3 months     Orders:  -     Microalbumin/Creatinine Ratio, Urine; Future; Expected date: 06/01/2023  -     Hemoglobin A1C; Future; Expected date: 06/01/2023  -     Ambulatory referral/consult to Diabetes Education; Future; Expected date: 06/08/2023    8. Liver lesion    9. Morbid obesity  Overview:  Long discussion re weight loss      10. Gastroesophageal reflux disease, unspecified whether esophagitis present  Overview:  Well controlled.   Discussed dietary change      11. Need for hepatitis C screening test  -     Hepatitis C Antibody; Future; Expected date: 06/01/2023    12. Screening for HIV (human immunodeficiency virus)  -     HIV 1/2 Ag/Ab (4th Gen); Future; Expected date: 06/01/2023    13. History of Pericardial effusion    14. Menopause  -     DXA Bone Density Axial Skeleton 1 or more sites; Future; Expected  date: 06/01/2023    15. Encounter for screening for malignant neoplasm of breast, unspecified screening modality  -     Mammo Digital Screening Bilat; Future; Expected date: 06/01/2023    16. Screening for colorectal cancer  -     Case Request Endoscopy: COLONOSCOPY    17. Encounter for screening mammogram for malignant neoplasm of breast  -     Mammo Digital Screening Bilat; Future; Expected date: 06/01/2023    18. Ear drainage, left  -     Ambulatory referral/consult to ENT; Future; Expected date: 06/08/2023    19. Routine gynecological examination  -     Ambulatory referral/consult to Gynecology; Future; Expected date: 06/08/2023    Other orders  -     walker Misc; 1 Device by Misc.(Non-Drug; Combo Route) route once daily.  Dispense: 1 each; Refill: 0  -     (In Office Administered) Pneumococcal Conjugate Vaccine (20 Valent) (IM)         ACP: discussion had about ACP to include definition of ACP, HCP, CODE STATUS. Paperwork handed to patient, to review, discuss with family and return it to clinic to be scanned into the chart.   Advance Care Planning             My total time spent on this encounter was at least 60 minutes which included  the following activities: preparing to see the patient, performing a medically appropriate and/or evaluation, counseling and educating the patient and family/caregiver, ordering medications, tests, or procedures, referring and communicating with other healthcare providers, documenting clinical information in the electronic or other health record. This time is independent and non-overlapping.         RTC in 3 months, sooner if needed and depending on labs.    Mohini Majano MD  Board Certified Internist/Geriatrician  [facility] [place] [address] ([facility]

## 2023-06-02 ENCOUNTER — TELEPHONE (OUTPATIENT)
Dept: TRANSPLANT | Facility: CLINIC | Age: 63
End: 2023-06-02
Payer: MEDICARE

## 2023-06-02 NOTE — LETTER
June 2, 2023    Marian Dumont  [address] [address]         Dear Marian Dumont:  MRN: [id number]     The [facility] team reviewed your transplant candidacy. You will be deferred for consideration for renal transplant due to your current BMI of 49.1. A weight of 238 is needed for a BMI of 38.4 in order to be considered for transplant.  Your chart will be closed. Should you reconsider renal transplant here at [facility], please discuss it with your dialysis unit for re-referral.    Although the [facility] team does not find you to be a candidate, you have the right to be evaluated at other transplant centers.     The [employer] for Organ Sharing provides a toll-free patient services line to help transplant candidates, recipients, and family members understand organ allocation practices and transplantation data.  You may also call this number to discuss a problem you may be experiencing with your transplant center or the transplant system in general.  The toll-free patient services line is [phone].    The [facility] team sincerely wishes you the best and remains available to answer any questions.  Should you have any questions regarding this decision, please do not hesitate to contact our pre-transplant office or myself.  You should continue to follow-up with your kidney doctor for routine medical care.     Sincerely,  GEAL Miguel, RN, [facility]   Michelle Abadie, BSN, RN    GELA Alcantar, RN                   Pre-Kidney Coordinators   Phone: [phone]  Fax: [phone]               The Organ Procurement and Transplantation Network   Toll-free patient services line: Your resource for organ transplant information     If you have a question regarding your own medical care, you always should call your transplant hospital first. However, for general organ transplant-related information, you can call the Organ Procurement and  Transplantation Network (OPTN) toll-free patient services line at [phone].     Anyone, including potential transplant candidates, candidates, recipients, family members, friends, living donors, and donor family members, can call this number to:     Talk about organ donation, living donation, the transplant process, the donation process, and transplant policies.   Get a free patient information kit with helpful booklets, waiting list and transplant information, and a list of all transplant hospitals.   Ask questions about the OPTN website (https://optn.transplant.hrsa.gov/), the [employer] for Organ Sharings (UNOS) website (https://unos.org/), or the UNOS website for living donors and transplant recipients. (https://www.transplantliving.org/).   Learn how the OPTN can help you.   Talk about any concerns that you may have with a transplant hospital.     The nations transplant system, the OPTN, is managed under federal contract by the [employer] for Organ Sharing (UNOS), which is a non-profit charitable organization. The OPTN helps create and define organ sharing policies that make the best use of donated organs. This process continuously evaluating new advances and discoveries so policies can be adapted to best serve patients waiting for transplants. To do so, the OPTN works closely with transplant professionals, transplant patients, transplant candidates, donor families, living donors, and the public. All transplant programs and organ procurement organizations throughout the country are OPTN members and are obligated to follow the policies the OPTN creates for allocating organs.     The OPTN also is responsible for:   Providing educational material for patients, the public, and professionals.   Raising awareness of the need for donated organs and tissue.   Coordinating organ procurement, matching, and placement.   Collecting information about every organ transplant and donation that occurs in the  [place].     Remember, you should contact your transplant hospital directly if you have questions or concerns about your own medical care including medical records, work-up progress, and test results.     We are not your transplant hospital, and our staff will not be able to answer questions about your case, so please keep your transplant hospitals phone number handy.   However, while you research your transplant needs and learn as much as you can about transplantation and donation, we welcome your call to our toll-free patient services line at [phone].

## 2023-06-05 ENCOUNTER — TELEPHONE (OUTPATIENT)
Dept: TRANSPLANT | Facility: CLINIC | Age: 63
End: 2023-06-05
Payer: MEDICARE

## 2023-06-05 NOTE — TELEPHONE ENCOUNTER
Contacted patient to review initial intake information. Patient reports the followin. Can you walk up a flight of stairs without getting short of breath or stopping?   2. Can you walk one block without getting short of breath or having to stop?   3. Do you use oxygen?  4. Do you use a cane, walker, or wheel chair to assist in mobility?  5. Have you been hospitalized or had recent surgery in the last 6 months?   A. Stroke   B. Heart surgery or heart catheterization   C. Broken bone  6. Do you have any cuts, open sores (ulcers), or wounds anywhere on your body? If yes, where and for how long?  7. Do you go to dialysis? What days?  8. Preferred appointment day?  9. Caregiver?  10. Best time to call?    Patient is aware the next steps will include completing records and compliance verification. Patient is aware once provider review and insurance authorization is received we will contact patient to schedule initial visit. Patient is aware that initial visit will begin prior to / at 7 am and will conclude at approximately 3 pm on date of appointment. All questions answered at this time.

## 2023-06-08 ENCOUNTER — OFFICE VISIT (OUTPATIENT)
Dept: OBSTETRICS AND GYNECOLOGY | Facility: CLINIC | Age: 63
End: 2023-06-08
Payer: MEDICARE

## 2023-06-08 VITALS
DIASTOLIC BLOOD PRESSURE: 85 MMHG | WEIGHT: 293 LBS | SYSTOLIC BLOOD PRESSURE: 142 MMHG | BODY MASS INDEX: 47.09 KG/M2 | HEART RATE: 88 BPM | HEIGHT: 66 IN

## 2023-06-08 DIAGNOSIS — Z01.419 ROUTINE GYNECOLOGICAL EXAMINATION: ICD-10-CM

## 2023-06-08 DIAGNOSIS — Z12.72 SCREENING FOR VAGINAL CANCER: ICD-10-CM

## 2023-06-08 DIAGNOSIS — Z01.419 WELL WOMAN EXAM WITH ROUTINE GYNECOLOGICAL EXAM: Primary | ICD-10-CM

## 2023-06-08 PROCEDURE — 99214 OFFICE O/P EST MOD 30 MIN: CPT | Mod: PBBFAC,PO | Performed by: STUDENT IN AN ORGANIZED HEALTH CARE EDUCATION/TRAINING PROGRAM

## 2023-06-08 PROCEDURE — G0101 PR CA SCREEN;PELVIC/BREAST EXAM: ICD-10-PCS | Mod: GZ,S$PBB,, | Performed by: STUDENT IN AN ORGANIZED HEALTH CARE EDUCATION/TRAINING PROGRAM

## 2023-06-08 PROCEDURE — 99999 PR PBB SHADOW E&M-EST. PATIENT-LVL IV: ICD-10-PCS | Mod: PBBFAC,,, | Performed by: STUDENT IN AN ORGANIZED HEALTH CARE EDUCATION/TRAINING PROGRAM

## 2023-06-08 PROCEDURE — 88175 CYTOPATH C/V AUTO FLUID REDO: CPT | Performed by: STUDENT IN AN ORGANIZED HEALTH CARE EDUCATION/TRAINING PROGRAM

## 2023-06-08 PROCEDURE — 99999 PR PBB SHADOW E&M-EST. PATIENT-LVL IV: CPT | Mod: PBBFAC,,, | Performed by: STUDENT IN AN ORGANIZED HEALTH CARE EDUCATION/TRAINING PROGRAM

## 2023-06-08 PROCEDURE — G0101 CA SCREEN;PELVIC/BREAST EXAM: HCPCS | Mod: GZ,S$PBB,, | Performed by: STUDENT IN AN ORGANIZED HEALTH CARE EDUCATION/TRAINING PROGRAM

## 2023-06-08 PROCEDURE — 87624 HPV HI-RISK TYP POOLED RSLT: CPT | Performed by: STUDENT IN AN ORGANIZED HEALTH CARE EDUCATION/TRAINING PROGRAM

## 2023-06-08 NOTE — PROGRESS NOTES
"[facility]    Subjective:     Chief Complaint:   Chief Complaint   Patient presents with    Gynecologic Exam       Patient's last menstrual period was No LMP recorded. Patient has had a hysterectomy.  Contraception: Hysterectomy.  HRT: None.    2023    Marian Dumont 63 y.o. female  who presents for an annual exam.  The patient has no GYN complaints today.  She participates in regular exercise: no.  She does not smoke.  She wears seatbelts.  She is not taking a multivitamin, vitamin D, and calcium.  She denies any domestic violence.    She had a total abdominal hysterectomy due to "cysts on her uterus".  Hysterectomy occurred shortly after her  delivery 1998.  She thinks her ovaries were conserved.    Last Pap: 20 years ago. Denies any history of abnormal pap smears.  She is due for mammogram and has it scheduled.    FH:  Breast cancer: none.  Colon cancer: none.  Endometrial cancer: none.  Ovarian cancer: none.      OB History    Para Term  AB Living   3 3 3     3   SAB IAB Ectopic Multiple Live Births           3      # Outcome Date GA Lbr Devan/2nd Weight Sex Delivery Anes PTL Lv   3 Term 98    F CS-Unspec   ADEBAYO   2 Term 84    F Vag-Spont   ADEBAYO   1 Term 80    F Vag-Spont   ADEBAYO       Past Medical History:   Diagnosis Date    Anemia     Coronary artery disease     COVID-19     Diabetes mellitus     Diabetes mellitus, type 2     Dialysis patient     Disorder of kidney and ureter     GERD (gastroesophageal reflux disease)     Hypertension     Mixed hyperlipidemia     Obese body habitus      Past Surgical History:   Procedure Laterality Date    BRAIN SURGERY       SECTION  1998    CHOLECYSTECTOMY      PERICARDIAL WINDOW  2015    TOTAL ABDOMINAL HYSTERECTOMY  1998    Done after C/S. Ovaries conserved. Due to "cysts on the uterus"     Review of patient's allergies indicates:   Allergen Reactions    Ace inhibitors " Anaphylaxis and Swelling     Other reaction(s): Angioedema         Social History     Socioeconomic History    Marital status:    Occupational History    Occupation: Retired in   Tobacco Use    Smoking status: Never    Smokeless tobacco: Never   Substance and Sexual Activity    Alcohol use: Never    Drug use: Never    Sexual activity: Not Currently   Social History Narrative    Patient's daughter granddaughter and grandson live with her. She enjoys reading.        Family History   Problem Relation Age of Onset    Heart disease Mother     Diabetes Mother     COPD Mother     Lung cancer Mother 85        lung CA    Heart disease Father     Stroke Father         cerebral aneurysm    Early death Father     Diabetes Sister     COPD Sister     Lung cancer Sister         lung CA    Depression Daughter     Colon cancer Maternal Aunt         colon CA       Medications  Current Outpatient Medications on File Prior to Visit   Medication Sig Dispense Refill Last Dose    amLODIPine (NORVASC) 5 MG tablet Take 5 mg by mouth 2 (two) times daily.   Taking    aspirin (ECOTRIN) 81 MG EC tablet Take 81 mg by mouth once daily.   Taking    calcitRIOL (ROCALTROL) 0.25 MCG Cap Take 0.25 mcg by mouth once daily.   Taking    carvediloL (COREG) 12.5 MG tablet Take 12.5 mg by mouth 2 (two) times daily with meals.   Taking    cetirizine (ZYRTEC) 10 MG tablet Take 10 mg by mouth once daily.   Taking    cinacalcet (SENSIPAR) 30 MG Tab Take 30 mg by mouth Daily.   Taking    fluticasone propionate (FLONASE) 50 mcg/actuation nasal spray 1 spray by Each Nostril route 2 (two) times a day.   Taking    ipratropium (ATROVENT) 42 mcg (0.06 %) nasal spray 2 sprays by Each Nostril route 2 (two) times daily.   Taking    LIDOcaine-prilocaine (EMLA) cream Apply 1 each topically as needed.   Taking    omeprazole (PRILOSEC) 40 MG capsule Take 40 mg by mouth once daily.   Taking    ondansetron (ZOFRAN) 4 MG tablet Take 1 tablet (4 mg total) by mouth every  "6 (six) hours as needed for Nausea. 34 tablet 0 Taking    rosuvastatin (CRESTOR) 10 MG tablet Take 10 mg by mouth once daily.   Taking    SAXagliptin (ONGLYZA) 2.5 mg Tab tablet Take 2.5 mg by mouth once daily.   Taking    walker Misc 1 Device by Misc.(Non-Drug; Combo Route) route once daily. (Patient not taking: Reported on 6/8/2023) 1 each 0 Not Taking       Review of Systems   Constitutional: Negative for appetite change, fever and unexpected weight change.   Respiratory: Negative for cough and shortness of breath.    Cardiovascular: Negative for chest pain and palpitations.   Gastrointestinal: Negative for abdominal distention, constipation, nausea and vomiting.   Genitourinary: Negative for dyspareunia, dysuria, hematuria and pelvic pain.        GYN ROS per HPI.   Musculoskeletal: Negative for gait problem and myalgias.   Skin: Negative for rash.   Neurological: Negative for dizziness, light-headedness and headaches.   Psychiatric/Behavioral: The patient is not nervous/anxious.      Objective:     BP (!) 142/85 (BP Location: Left arm, Patient Position: Sitting, BP Method: Medium (Automatic))   Pulse 88   Ht 5' 6" (1.676 m)   Wt (!) 138 kg (304 lb 3.8 oz)   BMI 49.10 kg/m²     Physical Exam  Vitals reviewed. Exam conducted with a chaperone present.   Constitutional:       General: She is not in acute distress.     Appearance: She is obese.   HENT:      Head: Normocephalic.   Eyes:      General: No scleral icterus.  Cardiovascular:      Rate and Rhythm: Normal rate and regular rhythm.   Pulmonary:      Effort: Pulmonary effort is normal. No respiratory distress.      Breath sounds: Normal breath sounds.   Chest:   Breasts:     Right: No swelling, bleeding, inverted nipple, mass, nipple discharge, skin change or tenderness.      Left: No swelling, bleeding, inverted nipple, mass, nipple discharge, skin change or tenderness.      Comments: Exam limited due to body habitus.  Abdominal:      General: Abdomen is " flat.      Palpations: Abdomen is soft.   Genitourinary:     General: Normal vulva.      Exam position: Supine.      Pubic Area: No rash.       Labia:         Right: No tenderness or lesion.         Left: No tenderness or lesion.       Vagina: Normal. No tenderness.      Adnexa: Right adnexa normal and left adnexa normal.      Comments: Exam limited due to body habitus.  Cervix and uterus surgically absent.  Lymphadenopathy:      Upper Body:      Right upper body: No axillary or pectoral adenopathy.      Left upper body: No axillary or pectoral adenopathy.   Skin:     Findings: No rash.   Neurological:      General: No focal deficit present.      Mental Status: She is alert.   Psychiatric:         Mood and Affect: Mood normal.         Behavior: Behavior normal.       Assessment:     1. Well woman exam with routine gynecological exam    2. Routine gynecological examination    3. Screening for vaginal cancer      Plan:     63 y.o. female presents today for annual pap smear and exam.     1. Well woman exam with routine gynecological exam    2. Routine gynecological examination  - Ambulatory referral/consult to Gynecology    3. Screening for vaginal cancer  - Liquid-Based Pap Smear, Screening  - HPV High Risk Genotypes, PCR      Follow up in about 1 year (around 6/8/2024) for annual exam or sooner if any GYN issues arise.       The above was reviewed and discussed with the patient.    Annual exam and screening issues based on the patient's age and family history were discussed.       - Pap and HPV testing performed. Since she does not have a cervix or uterus, she does not need to get a pap smear if she does not want to.   - Mammogram ordered/scheduled by PCP.  - Colonoscopy scheduled.  - Tobacco cessation N/A.    - DEXA N/A.  - Counseled to take daily multivitamin. If patient is of reproductive age and not on contraception, to take prenatal vitamin. Patient has been counseled on the vitamin D and calcium requirements  per ACOG recommendations.     Age         Calcium(mg/day)       Vitamin D (IU/day)  9-18                1300                       600  19-50              1000                       600  51-70              1200                       600  >70                  1200                       800      The patient's questions were answered, and she agrees with the current plan.      The patient was counseled today on the new ACS guidelines for cervical cytology screening as well as the current recommendations for breast cancer screening. She was counseled to follow up with her PCP for other routine health maintenance.      Lian Yanez PA-C  06/08/2023

## 2023-06-09 ENCOUNTER — TELEPHONE (OUTPATIENT)
Dept: PRIMARY CARE CLINIC | Facility: CLINIC | Age: 63
End: 2023-06-09
Payer: MEDICARE

## 2023-06-09 NOTE — TELEPHONE ENCOUNTER
"----- Message from Mohini Majano MD sent at 6/6/2023  7:52 AM CDT -----  Regarding: FW: UNOS Regulatory  Can you please get this patient in to see me. We need to come up with a plan for wieght loss to get her BMI down. Have her see me within the next week. Put reason for follow up" discuss wieght loss for kidney transplant"   ----- Message -----  From: Remedios Trotter MA  Sent: 6/2/2023   4:22 PM CDT  To: Mohini Majano MD  Subject: UNOS Regulatory                                    "

## 2023-06-13 ENCOUNTER — HOSPITAL ENCOUNTER (OUTPATIENT)
Dept: RADIOLOGY | Facility: CLINIC | Age: 63
Discharge: HOME | End: 2023-06-13
Attending: INTERNAL MEDICINE
Payer: MEDICARE

## 2023-06-13 DIAGNOSIS — Z78.0 MENOPAUSE: ICD-10-CM

## 2023-06-13 DIAGNOSIS — Z12.39 ENCOUNTER FOR SCREENING FOR MALIGNANT NEOPLASM OF BREAST, UNSPECIFIED SCREENING MODALITY: ICD-10-CM

## 2023-06-13 DIAGNOSIS — Z12.31 ENCOUNTER FOR SCREENING MAMMOGRAM FOR MALIGNANT NEOPLASM OF BREAST: ICD-10-CM

## 2023-06-13 PROCEDURE — 77063 MAMMO DIGITAL SCREENING BILAT WITH TOMO: ICD-10-PCS | Mod: 26,,, | Performed by: RADIOLOGY

## 2023-06-13 PROCEDURE — 77080 DXA BONE DENSITY AXIAL SKELETON 1 OR MORE SITES: ICD-10-PCS | Mod: 26,,, | Performed by: RADIOLOGY

## 2023-06-13 PROCEDURE — 77067 MAMMO DIGITAL SCREENING BILAT WITH TOMO: ICD-10-PCS | Mod: 26,,, | Performed by: RADIOLOGY

## 2023-06-13 PROCEDURE — 77063 BREAST TOMOSYNTHESIS BI: CPT | Mod: 26,,, | Performed by: RADIOLOGY

## 2023-06-13 PROCEDURE — 77067 SCR MAMMO BI INCL CAD: CPT | Mod: TC,PO

## 2023-06-13 PROCEDURE — 77080 DXA BONE DENSITY AXIAL: CPT | Mod: TC,PO

## 2023-06-13 PROCEDURE — 77067 SCR MAMMO BI INCL CAD: CPT | Mod: 26,,, | Performed by: RADIOLOGY

## 2023-06-13 PROCEDURE — 77080 DXA BONE DENSITY AXIAL: CPT | Mod: 26,,, | Performed by: RADIOLOGY

## 2023-06-14 ENCOUNTER — PATIENT MESSAGE (OUTPATIENT)
Dept: GASTROENTEROLOGY | Facility: CLINIC | Age: 63
End: 2023-06-14
Payer: MEDICARE

## 2023-06-14 ENCOUNTER — TELEPHONE (OUTPATIENT)
Dept: PRIMARY CARE CLINIC | Facility: CLINIC | Age: 63
End: 2023-06-14
Payer: MEDICARE

## 2023-06-14 LAB
CLINICAL INFO: NORMAL
CYTO CVX: NORMAL
CYTOLOGIST CVX/VAG CYTO: NORMAL
CYTOLOGIST CVX/VAG CYTO: NORMAL
CYTOLOGY CMNT CVX/VAG CYTO-IMP: NORMAL
CYTOLOGY PAP THIN PREP EXPLANATION: NORMAL
DATE OF PREVIOUS PAP: NORMAL
DATE PREVIOUS BX: NO
GEN CATEG CVX/VAG CYTO-IMP: NORMAL
HPV E6+E7 MRNA CVX QL NAA+PROBE: NOT DETECTED
LMP START DATE: NORMAL
MICROORGANISM CVX/VAG CYTO: NORMAL
PATHOLOGIST CVX/VAG CYTO: NORMAL
SERVICE CMNT-IMP: NORMAL
SPECIMEN SOURCE CVX/VAG CYTO: NORMAL
STAT OF ADQ CVX/VAG CYTO-IMP: NORMAL

## 2023-06-14 NOTE — TELEPHONE ENCOUNTER
----- Message from Mohini Majano MD sent at 6/13/2023  4:06 PM CDT -----  Wonderful news. Normal density of bones. Keep up whatever shes doing.

## 2023-06-15 ENCOUNTER — SOCIAL WORK (OUTPATIENT)
Dept: PRIMARY CARE CLINIC | Facility: CLINIC | Age: 63
End: 2023-06-15

## 2023-06-15 ENCOUNTER — TELEPHONE (OUTPATIENT)
Dept: PRIMARY CARE CLINIC | Facility: CLINIC | Age: 63
End: 2023-06-15
Payer: MEDICARE

## 2023-06-15 ENCOUNTER — OFFICE VISIT (OUTPATIENT)
Dept: PRIMARY CARE CLINIC | Facility: CLINIC | Age: 63
End: 2023-06-15
Payer: MEDICARE

## 2023-06-15 VITALS
HEIGHT: 67 IN | DIASTOLIC BLOOD PRESSURE: 80 MMHG | SYSTOLIC BLOOD PRESSURE: 143 MMHG | BODY MASS INDEX: 45.99 KG/M2 | TEMPERATURE: 99 F | HEART RATE: 89 BPM | OXYGEN SATURATION: 95 % | WEIGHT: 293 LBS | RESPIRATION RATE: 18 BRPM

## 2023-06-15 DIAGNOSIS — Z71.89 ENCOUNTER FOR CARDIAC RISK COUNSELING: ICD-10-CM

## 2023-06-15 DIAGNOSIS — Z99.2 ESRD ON HEMODIALYSIS: ICD-10-CM

## 2023-06-15 DIAGNOSIS — E11.9 TYPE 2 DIABETES MELLITUS WITHOUT COMPLICATION, WITHOUT LONG-TERM CURRENT USE OF INSULIN: Primary | ICD-10-CM

## 2023-06-15 DIAGNOSIS — I10 PRIMARY HYPERTENSION: ICD-10-CM

## 2023-06-15 DIAGNOSIS — N18.6 ESRD ON HEMODIALYSIS: ICD-10-CM

## 2023-06-15 DIAGNOSIS — E66.01 MORBID OBESITY: ICD-10-CM

## 2023-06-15 PROCEDURE — 99499 NO LOS: ICD-10-PCS | Mod: S$PBB,,, | Performed by: SOCIAL WORKER

## 2023-06-15 PROCEDURE — 99214 PR OFFICE/OUTPT VISIT, EST, LEVL IV, 30-39 MIN: ICD-10-PCS | Mod: S$PBB,,, | Performed by: INTERNAL MEDICINE

## 2023-06-15 PROCEDURE — 99215 OFFICE O/P EST HI 40 MIN: CPT | Mod: PBBFAC,PN | Performed by: INTERNAL MEDICINE

## 2023-06-15 PROCEDURE — 99999 PR PBB SHADOW E&M-EST. PATIENT-LVL V: ICD-10-PCS | Mod: PBBFAC,,, | Performed by: INTERNAL MEDICINE

## 2023-06-15 PROCEDURE — 99214 OFFICE O/P EST MOD 30 MIN: CPT | Mod: S$PBB,,, | Performed by: INTERNAL MEDICINE

## 2023-06-15 PROCEDURE — 99499 UNLISTED E&M SERVICE: CPT | Mod: S$PBB,,, | Performed by: SOCIAL WORKER

## 2023-06-15 PROCEDURE — 99999 PR PBB SHADOW E&M-EST. PATIENT-LVL V: CPT | Mod: PBBFAC,,, | Performed by: INTERNAL MEDICINE

## 2023-06-15 NOTE — PROGRESS NOTES
INTERNAL MEDICINE PROGRESS/URGENT CARE NOTE    CHIEF COMPLAINT     Chief Complaint   Patient presents with    Follow-up       HPI     Marian Dumont is a 63 y.o.  female who presents for an urgent/follow up visit today.    Met patient two weeks ago. Tried to refer to kidney transplant but she does not qualify due to BMI.   Today long discussion re wieght loss goals to try to qualify for transplant list.   Long discussion re weight loss. Will have her go to see cardiology for risk stratification. Given HD increase risk for CVD and with family history, would like to assess safety for exercise as well as cardiac status.     She will see therapist and dietitian for weight loss.   Will see me month for wieght checks       Home Medications:  Prior to Admission medications    Medication Sig Start Date End Date Taking? Authorizing Provider   amLODIPine (NORVASC) 5 MG tablet Take 5 mg by mouth once daily.   Yes Historical Provider   aspirin (ECOTRIN) 81 MG EC tablet Take 81 mg by mouth once daily.   Yes Historical Provider   calcitRIOL (ROCALTROL) 0.25 MCG Cap Take 0.25 mcg by mouth once daily.   Yes Historical Provider   carvediloL (COREG) 12.5 MG tablet Take 12.5 mg by mouth 2 (two) times daily with meals.   Yes Historical Provider   cetirizine (ZYRTEC) 10 MG tablet Take 10 mg by mouth once daily.   Yes Historical Provider   fluticasone propionate (FLONASE) 50 mcg/actuation nasal spray 1 spray by Each Nostril route 2 (two) times a day. 11/21/22  Yes Historical Provider   ipratropium (ATROVENT) 42 mcg (0.06 %) nasal spray 2 sprays by Each Nostril route 2 (two) times daily. 1/16/23  Yes Historical Provider   LIDOcaine-prilocaine (EMLA) cream Apply 1 each topically as needed. 1/26/23  Yes Historical Provider   omeprazole (PRILOSEC) 40 MG capsule Take 40 mg by mouth once daily.   Yes Historical Provider   ondansetron (ZOFRAN) 4 MG tablet Take 1 tablet (4 mg total) by mouth every 6 (six) hours as needed for Nausea. 3/12/23   "Yes Sean العلي MD   rosuvastatin (CRESTOR) 10 MG tablet Take 10 mg by mouth once daily.   Yes Historical Provider   SAXagliptin (ONGLYZA) 2.5 mg Tab tablet Take 2.5 mg by mouth once daily.   Yes Historical Provider   walker Misc 1 Device by Misc.(Non-Drug; Combo Route) route once daily. 6/1/23  Yes Mohini Majano MD   cinacalcet (SENSIPAR) 30 MG Tab Take 30 mg by mouth Daily. 1/18/23   Historical Provider       Review of Systems:  Review of Systems          PHYSICAL EXAM     BP (!) 143/80 (BP Location: Right arm, Patient Position: Sitting, BP Method: Large (Manual))   Pulse 89   Temp 99 °F (37.2 °C) (Oral)   Resp 18   Ht 5' 6.5" (1.689 m)   Wt (!) 139.1 kg (306 lb 10.6 oz)   SpO2 95%   BMI 48.75 kg/m²     GEN - A+OX4, NAD   HEENT - PERRL, EOMI, OP clear  Neck - No thyromegaly or cervical LAD. No thyroid masses felt.  CV - RRR, no m/r   Chest - CTAB, no wheezing or rhonchi  Abd - S/NT/ND/+BS.   Ext - 2+BDP and radial pulses. No C/C/E.  Skin - No rash.    LABS       ASSESSMENT/PLAN     Marian Dumont is a 63 y.o. female with  1. Type 2 diabetes mellitus without complication, without long-term current use of insulin  Referral to a dietitian for wieght loss and control of diabetes  Overview:  Not at goal. Discussed lowering carb intake or will add medication in 3 months     Orders:  -     Ambulatory referral/consult to Nutrition Services; Future; Expected date: 06/22/2023    2. Primary hypertension  Overview:  BP not at goal today as shes due for HD tomorrow   Continue with current regimen       3. Morbid obesity  Aiming for lot of wieght loss to quaklify for kidney transplant   Overview:  Long discussion re weight loss    Orders:  -     Ambulatory referral/consult to Nutrition Services; Future; Expected date: 06/22/2023  -     Ambulatory referral/consult to [facility]; Future; Expected date: 06/22/2023    4. ESRD on hemodialysis  -     Ambulatory referral/consult to " Nutrition Services; Future; Expected date: 06/22/2023    5. Encounter for cardiac risk counseling  -     Ambulatory referral/consult to Cardiology; Future; Expected date: 06/22/2023           WORRY SCORE 3    RTC in 1 months, sooner if needed and depending on labs.    Mohini Majano MD  Board Certified Internist/Geriatrician  [facility]

## 2023-06-15 NOTE — PATIENT INSTRUCTIONS
-walk for an 30-hour every day. Try to get some morning sunlight  -do 3-4 days of weights or resistance sessions per week. At the gym with water aerobics and weights. If your gym has  ask to work out with them.   -sleep more than 7 hours+ per night  -eat 4 big servings of protein every day, unless told otherwise ie advanced kidney disease. Ideally 100-150g of protein daily.   -double your fruit and vegetable intake  -eat small meals often and restrict portion size for the mail meals.   -do something mentally stimulating daily to keep your mind sharp. Either read for an hour, play card games, do lumosity excercises, learn a new language or instrument, go to a lecture or a talk.   -see the therapist for discussion of reason for overeating.   -you will be seeing the dietitian   You will see me every month for weight check and check in.

## 2023-06-15 NOTE — PROGRESS NOTES
Patient is presenting for scheduled appointment with MD. She is being referred to clinician secondary to weight loss. Her daughter, More, is present.  Clinician met with client regarding interest in receiving individual therapy. Patient verbalized agreement and is scheduled to return on  6/20/2023 at 3:00.     Pt is currently on Dialysis, does on M, W, and F. She stated she would likely be a candidate for kidney transplant, but need to lose weight. She said her middle daughter does the cooking. She c/o having dietary restrictions with different dx that are a barrier to making good food choices. She stated she is meeting with a dietician who hopefully can help with that.

## 2023-06-20 ENCOUNTER — CLINICAL SUPPORT (OUTPATIENT)
Dept: PRIMARY CARE CLINIC | Facility: CLINIC | Age: 63
End: 2023-06-20
Payer: MEDICARE

## 2023-06-20 DIAGNOSIS — F50.9 EATING DISORDER, UNSPECIFIED TYPE: Primary | ICD-10-CM

## 2023-06-20 PROCEDURE — 99499 UNLISTED E&M SERVICE: CPT | Mod: S$PBB,,, | Performed by: SOCIAL WORKER

## 2023-06-20 PROCEDURE — 99499 NO LOS: ICD-10-PCS | Mod: S$PBB,,, | Performed by: SOCIAL WORKER

## 2023-06-20 NOTE — PROGRESS NOTES
[facility] BEHAVIORAL HEALTH INTAKE    DATE:  6/20/2023  REFERRAL SOURCE:  Mohini Majano MD  TYPE OF VISIT:  In person  LENGTH OF SESSION: 60  .  HISTORY OF PRESENTING ILLNESS:  Marian Dumont, a 63 y.o. female with history of  overeating  .    CHIEF COMPLAINT/REASON FOR ENCOUNTER: Pt presented for initial assessment. Met with patient. Pt's chief complaint includes the following: appetite.    Patient does not currently have a psychiatrist.    Patient does not currently have a therapist.     No reported psych medication currently, or in the past.     Current symptoms:  Depression: denies. Declined to complete PHQ 9  Anxiety: denies. Declined to complete TYREE 7.   However, she admitted she does not like crowds, closed spaces, or bridges. No reported panic attacks, social phobia, or racing thoughts. She acknowledged a hx of taking care of others at her own expense.   Insomnia:  she reported px staying asleep  .  Layla:  denies.  Psychosis: denies .  Personality Disorder:  Other sx reported: Pt was not aware of being irritable until it was pointed out to her.     Session Content/Presenting Problem Hx:  She noted [place] She admitted the houses they lived in often lacked basic necessities. But she said no matter what condition, the house was always clean. She said that they did not always have food. But stated her mother managed to have money to buy cigarettes.She said her father was not supportive, was a venegas to get him to pay child support. Pt acknowledged that she continues to eat junk food. She made the connection,  that growing up, they did not have food, much less junk food. She said as she got older, she ate a lot of junk food. And could not afford to eat out as a child, but eats out a lot now. She said her daughter works, and often easier to get fast food.     Current social stressors:   Pt is receiving dialysis and in order to be considered for a kidney transplant,  she is required to lose weight. She has 3 daughters, one who is high functioning special needs lives with her. This daughter has children, including 10 y/o grandson. Pt stated she helps her daughter with reminders of what she needs to do, and also she identified helping with 10 y/o while her daughter works. Pt stated it was hard to keep up with Deepika's stuff while pt worked full time, but easier now that she retired last year.     Pt reported a hx of brain surgery secondary to some type of brain bleed. She did not report having any type of injury to cause it.       Risk assessment:  Patient reports no suicidal ideation  Patient reports no homicidal ideation  Patient reports no self-injurious behavior  Patient reports no violent behavior    PSYCHIATRIC HISTORY:  History of Layla or diagnosis of Bipolar Disorder in the past:  No  History of Psychosis or diagnosis of Schizophrenia in the past:  No  Previous Psychiatric Hospitalizations:  No  Previous SI/HI:   No  Previous Suicide Attempts:  No  Previous Medication Trials: No   Previous Psychiatric Outpatient Treatment:  No  History of Trauma:  No  History of Violence:  No  Access to a Gun:  Yes - but stated she does not have bullets     SUBSTANCE ABUSE HISTORY:  Tobacco:  No   Alcohol: none  Illicit Substances: No  Misuse of Prescription Medications:  No    MEDICAL HISTORY:  Past Medical History:   Diagnosis Date    Anemia     Coronary artery disease     COVID-19     Diabetes mellitus     Diabetes mellitus, type 2     Dialysis patient     Disorder of kidney and ureter     GERD (gastroesophageal reflux disease)     Hypertension     Mixed hyperlipidemia     Obese body habitus        NEUROLOGIC HISTORY:  Seizures:  No  Head trauma:  No  Memory loss:  No    SOCIAL HISTORY (MARRIAGE, EMPLOYMENT, etc.):  Living Situation: Pt lives in [place], [place] with her 2 of her 3 daughters, and grandchildren. One daughter is aged 24 y/o and other is 35 y/o.   Family: She has 3 daughters,  one who is high functioning special needs lives with her. This daughter has children, including 10 y/o grandson. She has a 26 y/o daughter and a 42 y/o daughter who lives in [place].   Nuclear/Marriage: Pt was  at for about 8 years and . Her first 2 children are the product of the marriage. Her younger daughter is the product of another relationship. She stated she went to school with both men, and she maintains a good relationship with both of them. For example, her ex- is good friends with pt's cousin and still attends family events.   Extended Family: Pt reported some contact with her father's family, but not currently. Not a lot of contact with her sisters was reported.   Supports:Her daughters are her reported supports.   Education/Vocation: Pt stated she graduated from [employer], in [place] [facility]. 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   Temple/Spirituality: She stated she has not found a Buddhist here because she prefers small, country type Buddhist with few people, and more intimacy.   Hobbies and Interests: Reading, watching tv, and playing games on her phone   Other history: Pt lived in [place], [place]. She stated she moved to this area over a year ago to be close to her youngest daughter. Pt stated her 26 y/o daughter was in college at [employer] and was struggling. She has 3 sisters, one older and 2 younger. Her father  in  and mother is  as of a month ago.       PSYCHIATRIC FAMILY HISTORY:  One daughter is noted to have a hx of px with anxiety and/or depression secondary to px adjusting to college.       MENTAL HEALTH STATUS EXAM  General Appearance:  unremarkable, age appropriate   Speech: normal tone, normal rate, normal pitch, normal volume, slowed      Level of Cooperation: hostile      Thought Processes: normal and logical, goal-directed   Mood: irritable      Thought Content: normal, no suicidality, no homicidality, delusions,  or paranoia   Affect: congruent and appropriate   Orientation: Did not assess    Memory: Did not assess    Attention Span & Concentration: Did not assess    Fund of General Knowledge: Did not assess    Abstract Reasoning: Did not assess    Judgment & Insight: Questionable      Language  Did not assess        IMPRESSION:   My diagnostic impression is  eating disorder  , as evidenced by pt's self report and need for weight loss to be considered for kidney transplant .  Pt would likely benefit from increased social supports    PROVISIONAL DIAGNOSES:  No diagnosis found.     STRENGTHS AND LIABILITIES: Strength: Patient is stable., Liability: Patient is hostile., Liability: Patient has no suport network., Liability: Patient has poor health., Liability: Patient has poor judgment, Liability: Patient lacks coping skills.    TREATMENT GOALS:  Pt stated she is presenting for treatment because her daughter wants her to do so, and she needs to lose weight to have the transplant. However, she was not overly receptive to receiving  treatment. Once reassured that she would not have to come 1x week, but she can schedule every 2 or 3 weeks, she seemed to be more receptive. At this point, unsure of what treatment will focus.     PLAN: In this session a psych evaluation was conducted to get history and process pt's life. CBT will be utilized in future individual therapy sessions to increase insight and support. Clinician will continue to assess patient for areas of clinical concern and motivation for treatment.     RETURN TO CLINIC:  7/11/2023 at 3:00.

## 2023-06-22 ENCOUNTER — PATIENT MESSAGE (OUTPATIENT)
Dept: PRIMARY CARE CLINIC | Facility: CLINIC | Age: 63
End: 2023-06-22
Payer: MEDICARE

## 2023-06-22 DIAGNOSIS — E11.9 TYPE 2 DIABETES MELLITUS WITHOUT COMPLICATION, WITHOUT LONG-TERM CURRENT USE OF INSULIN: Primary | ICD-10-CM

## 2023-06-22 RX ORDER — SEMAGLUTIDE 0.68 MG/ML
0.5 INJECTION, SOLUTION SUBCUTANEOUS
Qty: 3 ML | Refills: 1 | Status: SHIPPED | OUTPATIENT
Start: 2023-06-22 | End: 2023-07-18 | Stop reason: SDUPTHER

## 2023-06-22 RX ORDER — SEMAGLUTIDE 1.34 MG/ML
1 INJECTION, SOLUTION SUBCUTANEOUS
Qty: 3 ML | Refills: 2 | Status: SHIPPED | OUTPATIENT
Start: 2023-06-22 | End: 2023-06-22

## 2023-07-03 ENCOUNTER — PATIENT MESSAGE (OUTPATIENT)
Dept: PRIMARY CARE CLINIC | Facility: CLINIC | Age: 63
End: 2023-07-03
Payer: MEDICARE

## 2023-07-03 NOTE — TELEPHONE ENCOUNTER
Pt sent a message via [facility]:     There was a problem with insurance. They would not pay due the Ozempic and recommended a different one that does not aid in weight loss. I think if you send a letter theyll review it.

## 2023-07-06 ENCOUNTER — TELEPHONE (OUTPATIENT)
Dept: PRIMARY CARE CLINIC | Facility: CLINIC | Age: 63
End: 2023-07-06
Payer: MEDICARE

## 2023-07-06 ENCOUNTER — OFFICE VISIT (OUTPATIENT)
Dept: CARDIOLOGY | Facility: CLINIC | Age: 63
End: 2023-07-06
Payer: MEDICARE

## 2023-07-06 VITALS
DIASTOLIC BLOOD PRESSURE: 78 MMHG | SYSTOLIC BLOOD PRESSURE: 128 MMHG | RESPIRATION RATE: 16 BRPM | WEIGHT: 293 LBS | HEIGHT: 67 IN | BODY MASS INDEX: 45.99 KG/M2 | HEART RATE: 76 BPM | OXYGEN SATURATION: 98 %

## 2023-07-06 DIAGNOSIS — Z71.89 ENCOUNTER FOR CARDIAC RISK COUNSELING: ICD-10-CM

## 2023-07-06 DIAGNOSIS — E66.01 MORBID OBESITY: ICD-10-CM

## 2023-07-06 DIAGNOSIS — R00.2 PALPITATIONS: ICD-10-CM

## 2023-07-06 DIAGNOSIS — I10 PRIMARY HYPERTENSION: Primary | ICD-10-CM

## 2023-07-06 DIAGNOSIS — Z01.818 ENCOUNTER FOR PRE-TRANSPLANT EVALUATION FOR KIDNEY TRANSPLANT: ICD-10-CM

## 2023-07-06 DIAGNOSIS — E66.01 SEVERE OBESITY: ICD-10-CM

## 2023-07-06 DIAGNOSIS — N18.6 ESRD ON HEMODIALYSIS: ICD-10-CM

## 2023-07-06 DIAGNOSIS — E11.9 TYPE 2 DIABETES MELLITUS WITHOUT COMPLICATION, WITHOUT LONG-TERM CURRENT USE OF INSULIN: ICD-10-CM

## 2023-07-06 DIAGNOSIS — Q61.2 ADPKD (AUTOSOMAL DOMINANT POLYCYSTIC KIDNEY): ICD-10-CM

## 2023-07-06 DIAGNOSIS — Z99.2 ESRD ON HEMODIALYSIS: ICD-10-CM

## 2023-07-06 DIAGNOSIS — I31.39 PERICARDIAL EFFUSION: ICD-10-CM

## 2023-07-06 PROCEDURE — 99999 PR PBB SHADOW E&M-EST. PATIENT-LVL IV: ICD-10-PCS | Mod: PBBFAC,,, | Performed by: INTERNAL MEDICINE

## 2023-07-06 PROCEDURE — 99205 PR OFFICE/OUTPT VISIT, NEW, LEVL V, 60-74 MIN: ICD-10-PCS | Mod: S$PBB,,, | Performed by: INTERNAL MEDICINE

## 2023-07-06 PROCEDURE — 99999 PR PBB SHADOW E&M-EST. PATIENT-LVL IV: CPT | Mod: PBBFAC,,, | Performed by: INTERNAL MEDICINE

## 2023-07-06 PROCEDURE — 99214 OFFICE O/P EST MOD 30 MIN: CPT | Mod: PBBFAC,PN | Performed by: INTERNAL MEDICINE

## 2023-07-06 PROCEDURE — 99205 OFFICE O/P NEW HI 60 MIN: CPT | Mod: S$PBB,,, | Performed by: INTERNAL MEDICINE

## 2023-07-06 NOTE — PROGRESS NOTES
"Subjective:    Patient ID:  Marian Dumont is a 63 y.o. female     Chief Complaint   Patient presents with    Hypertension       HPI:  Ms Marian Dumont is a 63 y.o. female is here for initial consultation.    Patient has a known history of polycystic had end-stage renal disease ended up on hemodialysis.  She is currently on dialysis.  Patient is seeking to have transplantation of her kidneys.    Patient is significantly obese and wants to lose weight and wants to be checked out both for surgery as well as to do exercise.    At the present time she does have some shortness of breath on exertion.  But denies any chest pain or tightness or heaviness denies any dizziness or lightheadedness or loss of consciousness.  She does have intermittent palpitations on exertion.    She is taking all her medications on regular basis and she does go to dialysis on regular basis.    Review of patient's allergies indicates:   Allergen Reactions    Ace inhibitors Anaphylaxis and Swelling     Other reaction(s): Angioedema         Past Medical History:   Diagnosis Date    Anemia     Coronary artery disease     COVID-19     Diabetes mellitus     Diabetes mellitus, type 2     Dialysis patient     Disorder of kidney and ureter     GERD (gastroesophageal reflux disease)     Hypertension     Mixed hyperlipidemia     Obese body habitus      Past Surgical History:   Procedure Laterality Date    BRAIN SURGERY       SECTION  1998    CHOLECYSTECTOMY      PERICARDIAL WINDOW      TOTAL ABDOMINAL HYSTERECTOMY  1998    Done after C/S. Ovaries conserved. Due to "cysts on the uterus"     Social History     Tobacco Use    Smoking status: Never    Smokeless tobacco: Never   Substance Use Topics    Alcohol use: Never    Drug use: Never     Family History   Problem Relation Age of Onset    Heart disease Mother     Diabetes Mother     COPD Mother     Lung cancer Mother 85        lung CA    Heart disease Father     Stroke Father     "     cerebral aneurysm    Early death Father     Diabetes Sister     COPD Sister     Lung cancer Sister         lung CA    Depression Daughter     Colon cancer Maternal Aunt         colon CA        Review of Systems:   Constitution: Negative for diaphoresis and fever.   HEENT: Negative for nosebleeds.    Cardiovascular: Negative for chest pain       Intermittent dyspnea on exertion       No leg swelling       Intermittent palpitations  Respiratory: Negative for shortness of breath and wheezing.    Hematologic/Lymphatic: Negative for bleeding problem. Does not bruise/bleed easily.   Skin: Negative for color change and rash.   Musculoskeletal: Negative for falls and myalgias.   Gastrointestinal: Negative for hematemesis and hematochezia.   Genitourinary: Negative for hematuria.   Neurological: Negative for dizziness and light-headedness.   Psychiatric/Behavioral: Negative for altered mental status and memory loss.          Objective:        Vitals:    07/06/23 0903   BP: 128/78   Pulse: 76   Resp: 16       Lab Results   Component Value Date    WBC 8.74 03/11/2023    HGB 10.4 (L) 03/11/2023    HCT 33.0 (L) 03/11/2023     03/11/2023    ALT 10 03/10/2023    AST 13 03/10/2023     03/11/2023    K 4.6 03/11/2023    CL 95 03/11/2023    CREATININE 8.9 (H) 03/11/2023    BUN 42 (H) 03/11/2023    CO2 28 03/11/2023    TSH 3.900 03/10/2023    INR 1.0 03/10/2023    HGBA1C 7.4 (H) 03/11/2023        ECHOCARDIOGRAM RESULTS  Results for orders placed during the hospital encounter of 01/07/23    Echo    Interpretation Summary  · The left ventricle is normal in size with moderate concentric hypertrophy and normal systolic function.  · Moderate left atrial enlargement.  · The estimated ejection fraction is 65%.  · Normal left ventricular diastolic function.  · Mild mitral regurgitation.  · Mild right atrial enlargement.  · Normal right ventricular size with normal right ventricular systolic function.  · Normal central venous  pressure (3 mmHg).        CURRENT/PREVIOUS VISIT EKG  Results for orders placed or performed during the hospital encounter of 03/10/23   EKG 12-lead    Collection Time: 03/10/23  2:11 PM    Narrative    Test Reason : R06.02,    Vent. Rate : 089 BPM     Atrial Rate : 089 BPM     P-R Int : 144 ms          QRS Dur : 080 ms      QT Int : 410 ms       P-R-T Axes : 029 -24 011 degrees     QTc Int : 498 ms    Normal sinus rhythm  Minimal voltage criteria for LVH, may be normal variant ( R in aVL )  Anterolateral infarct ,age undetermined  Abnormal ECG  When compared with ECG of 07-JAN-2023 19:16,  Anterolateral infarct is now Present  Nonspecific T wave abnormality now evident in Inferior leads  Confirmed by Brett Howell MD ([id number]) on 3/13/2023 4:37:15 PM    Referred By:             Confirmed By:Brett Howell MD     No valid procedures specified.   No results found for this or any previous visit.      Physical Exam:  CONSTITUTIONAL: No fever, no chills  HEENT: Normocephalic, atraumatic,pupils reactive to light                 NECK:  No JVD no carotid bruit  CVS: S1S2+, RRR, systolic murmurs,   LUNGS: Clear  ABDOMEN: Soft, NT, BS+  EXTREMITIES: No cyanosis, edema  : No last catheter  NEURO: AAO X 3  PSY: Normal affect      Medication List with Changes/Refills   Current Medications    AMLODIPINE (NORVASC) 5 MG TABLET    Take 5 mg by mouth once daily.    ASPIRIN (ECOTRIN) 81 MG EC TABLET    Take 81 mg by mouth once daily.    CALCITRIOL (ROCALTROL) 0.25 MCG CAP    Take 0.25 mcg by mouth once daily.    CARVEDILOL (COREG) 12.5 MG TABLET    Take 12.5 mg by mouth 2 (two) times daily with meals.    CETIRIZINE (ZYRTEC) 10 MG TABLET    Take 10 mg by mouth once daily.    CINACALCET (SENSIPAR) 30 MG TAB    Take 30 mg by mouth Daily.    FLUTICASONE PROPIONATE (FLONASE) 50 MCG/ACTUATION NASAL SPRAY    1 spray by Each Nostril route 2 (two) times a day.    IPRATROPIUM (ATROVENT) 42 MCG (0.06 %) NASAL SPRAY    2 sprays by Each Nostril  route 2 (two) times daily.    LIDOCAINE-PRILOCAINE (EMLA) CREAM    Apply 1 each topically as needed.    OMEPRAZOLE (PRILOSEC) 40 MG CAPSULE    Take 40 mg by mouth once daily.    ONDANSETRON (ZOFRAN) 4 MG TABLET    Take 1 tablet (4 mg total) by mouth every 6 (six) hours as needed for Nausea.    ROSUVASTATIN (CRESTOR) 10 MG TABLET    Take 10 mg by mouth once daily.    SEMAGLUTIDE (OZEMPIC) 0.25 MG OR 0.5 MG (2 MG/3 ML) PEN INJECTOR    Inject 0.5 mg into the skin every 7 days.    WALKER MISC    1 Device by Misc.(Non-Drug; Combo Route) route once daily.             Assessment:       1. Primary hypertension    2. Type 2 diabetes mellitus without complication, without long-term current use of insulin    3. Morbid obesity    4. Pericardial effusion    5. ESRD on hemodialysis    6. ADPKD (autosomal dominant polycystic kidney)    7. Encounter for cardiac risk counseling    8. Encounter for pre-transplant evaluation for kidney transplant    9. Palpitations    10. Severe obesity         Plan:   1. Essential hypertension   Patient's blood pressure is very well controlled is 128/78.  And she is currently on amlodipine 5 mg p.o. q.day continue the same she is also on carvedilol 12.5 mg p.o. b.i.d..    2. Pre transplant evaluation.    Getting ready to be going for transplant evaluation.  She wants to lose weight at the same time she also wants to be cleared for transplant.    Will schedule her for Lexiscan Cardiolite study to evaluate for ischemia.    Will also do a 2D echocardiogram for LV function ejection fraction wall motion abnormality.    3. Intermittent palpitations  new   Will also put on a Zio for 48 hours to see and evaluate her palpitations.    4. Patient currently weighs about 300 lb.  Patient really wants to lose weight because he wants to be a candidate for kidney transplant.    Discussed with patient in regards with weight loss and given her the instructions to do so.    Patient is also scheduled to meet the  nutritionist to help her to eat better.  5. End-stage renal disease on hemodialysis  She is currently undergoing hemodialysis.  And she goes on alternate days.  And has been regularly going for the same.    6. Pericardial effusion   Patient had prior history of pericardial effusion and she underwent pericardial window and will repeat echocardiogram to evaluate the same.  7. EKG  Reviewed EKG independently patient is in normal sinus rhythm with heart rate of 89 beats per minute poor R-wave progression in the anterior precordial leads cannot rule out anterior lateral infarct age undetermined.  And leftward axis.    8. Patient to follow-up with me in the office after the testing has been completed.          Problem List Items Addressed This Visit          Cardiac/Vascular    Primary hypertension - Primary    Overview     BP well controlled today  Continue with current regimen          Pericardial effusion    Encounter for cardiac risk counseling       Renal/    ADPKD (autosomal dominant polycystic kidney)    Overview     On HD          ESRD on hemodialysis       Endocrine    Type 2 diabetes mellitus without complication, without long-term current use of insulin    Overview     Not at goal. Discussed lowering carb intake or will add medication in 3 months          Morbid obesity    Overview     Long discussion re weight loss          Other Visit Diagnoses       Encounter for pre-transplant evaluation for kidney transplant        Palpitations        Severe obesity                No follow-ups on file.

## 2023-07-18 ENCOUNTER — PATIENT MESSAGE (OUTPATIENT)
Dept: PRIMARY CARE CLINIC | Facility: CLINIC | Age: 63
End: 2023-07-18

## 2023-07-18 ENCOUNTER — OFFICE VISIT (OUTPATIENT)
Dept: PRIMARY CARE CLINIC | Facility: CLINIC | Age: 63
End: 2023-07-18
Payer: MEDICARE

## 2023-07-18 ENCOUNTER — TELEPHONE (OUTPATIENT)
Dept: PRIMARY CARE CLINIC | Facility: CLINIC | Age: 63
End: 2023-07-18
Payer: MEDICARE

## 2023-07-18 VITALS
WEIGHT: 293 LBS | HEART RATE: 81 BPM | DIASTOLIC BLOOD PRESSURE: 86 MMHG | OXYGEN SATURATION: 96 % | SYSTOLIC BLOOD PRESSURE: 132 MMHG | BODY MASS INDEX: 48.35 KG/M2 | TEMPERATURE: 99 F

## 2023-07-18 DIAGNOSIS — E66.01 MORBID OBESITY: Primary | ICD-10-CM

## 2023-07-18 DIAGNOSIS — M19.90 ARTHRITIS: Primary | ICD-10-CM

## 2023-07-18 PROCEDURE — 99212 OFFICE O/P EST SF 10 MIN: CPT | Mod: PBBFAC,PN | Performed by: INTERNAL MEDICINE

## 2023-07-18 PROCEDURE — 99417 PR PROLONGED SVC, OUTPT, W/WO DIRECT PT CONTACT,  EA ADDTL 15 MIN: ICD-10-PCS | Mod: S$PBB,,, | Performed by: INTERNAL MEDICINE

## 2023-07-18 PROCEDURE — 99999 PR PBB SHADOW E&M-EST. PATIENT-LVL II: ICD-10-PCS | Mod: PBBFAC,,, | Performed by: INTERNAL MEDICINE

## 2023-07-18 PROCEDURE — 99999 PR PBB SHADOW E&M-EST. PATIENT-LVL II: CPT | Mod: PBBFAC,,, | Performed by: INTERNAL MEDICINE

## 2023-07-18 PROCEDURE — 99215 OFFICE O/P EST HI 40 MIN: CPT | Mod: S$PBB,,, | Performed by: INTERNAL MEDICINE

## 2023-07-18 PROCEDURE — 99215 PR OFFICE/OUTPT VISIT, EST, LEVL V, 40-54 MIN: ICD-10-PCS | Mod: S$PBB,,, | Performed by: INTERNAL MEDICINE

## 2023-07-18 PROCEDURE — 99417 PROLNG OP E/M EACH 15 MIN: CPT | Mod: S$PBB,,, | Performed by: INTERNAL MEDICINE

## 2023-07-18 RX ORDER — ROSUVASTATIN CALCIUM 10 MG/1
10 TABLET, COATED ORAL DAILY
Qty: 90 TABLET | Refills: 3 | Status: SHIPPED | OUTPATIENT
Start: 2023-07-18

## 2023-07-18 RX ORDER — SEMAGLUTIDE 0.68 MG/ML
0.5 INJECTION, SOLUTION SUBCUTANEOUS
Qty: 3 ML | Refills: 1 | Status: SHIPPED | OUTPATIENT
Start: 2023-07-18 | End: 2023-08-31

## 2023-07-18 NOTE — PROGRESS NOTES
INTERNAL MEDICINE PROGRESS/URGENT CARE NOTE    CHIEF COMPLAINT     No chief complaint on file.      HPI     Marian Dumont is a 63 y.o.  female who presents for an urgent/follow up visit today.  Here for weight check   Trying to work on weight  loss so she can be referred for kidney transplant.   Went to one session of the therapy and canceled her next appointment  No notable weight loss on her chart here but says she has lost some weight on her scale at home  Having trouble with her diet because everything weve told her to eat she says is affecting her lytes at dialysis. Told her to eat yogurt and blueberries, she does and the nutritionist told her it was raising her calcium too high      Past Medical History:  Past Medical History:   Diagnosis Date    Anemia     Coronary artery disease     COVID-19     Diabetes mellitus     Diabetes mellitus, type 2     Dialysis patient     Disorder of kidney and ureter     GERD (gastroesophageal reflux disease)     Hypertension     Mixed hyperlipidemia     Obese body habitus        Home Medications:  Prior to Admission medications    Medication Sig Start Date End Date Taking? Authorizing Provider   amLODIPine (NORVASC) 5 MG tablet Take 5 mg by mouth once daily.    Historical Provider   aspirin (ECOTRIN) 81 MG EC tablet Take 81 mg by mouth once daily.    Historical Provider   calcitRIOL (ROCALTROL) 0.25 MCG Cap Take 0.25 mcg by mouth once daily.    Historical Provider   carvediloL (COREG) 12.5 MG tablet Take 12.5 mg by mouth 2 (two) times daily with meals.    Historical Provider   cetirizine (ZYRTEC) 10 MG tablet Take 10 mg by mouth once daily.    Historical Provider   cinacalcet (SENSIPAR) 30 MG Tab Take 30 mg by mouth Daily. 1/18/23   Historical Provider   fluticasone propionate (FLONASE) 50 mcg/actuation nasal spray 1 spray by Each Nostril route 2 (two) times a day. 11/21/22   Historical Provider   ipratropium (ATROVENT) 42 mcg (0.06 %) nasal spray 2 sprays by Each Nostril  route 2 (two) times daily. 1/16/23   Historical Provider   LIDOcaine-prilocaine (EMLA) cream Apply 1 each topically as needed. 1/26/23   Historical Provider   omeprazole (PRILOSEC) 40 MG capsule Take 40 mg by mouth once daily.    Historical Provider   ondansetron (ZOFRAN) 4 MG tablet Take 1 tablet (4 mg total) by mouth every 6 (six) hours as needed for Nausea. 3/12/23   Sean العلي MD   rosuvastatin (CRESTOR) 10 MG tablet Take 1 tablet (10 mg total) by mouth once daily. 7/18/23   Mohini Majano MD   semaglutide (OZEMPIC) 0.25 mg or 0.5 mg (2 mg/3 mL) pen injector Inject 0.5 mg into the skin every 7 days. 7/18/23   Mohini Majano MD   walker Misc 1 Device by Misc.(Non-Drug; Combo Route) route once daily. 6/1/23   Mohini Majano MD   rosuvastatin (CRESTOR) 10 MG tablet Take 10 mg by mouth once daily.  7/18/23  Historical Provider   semaglutide (OZEMPIC) 0.25 mg or 0.5 mg (2 mg/3 mL) pen injector Inject 0.5 mg into the skin every 7 days. 6/22/23 7/18/23  Mohini Majano MD       Review of Systems:  Review of Systems        PHYSICAL EXAM     /86   Pulse 81   Temp 98.7 °F (37.1 °C)   Wt (!) 137.9 kg (304 lb 2 oz)   SpO2 96%   BMI 48.35 kg/m²     GEN - A+OX4, NAD   HEENT - PERRL, EOMI, OP clear  Neck - No thyromegaly or cervical LAD. No thyroid masses felt.  CV - RRR, no m/r   Chest - CTAB, no wheezing or rhonchi  Abd - S/NT/ND/+BS.   Ext - 2+BDP and radial pulses. No C/C/E.  Skin - No rash.    LABS         ASSESSMENT/PLAN     Marian Dumont is a 63 y.o. female with  1. Morbid obesity  Overview:  Long discussion re weight loss  Trying withg diet  Scheduled to see the nutritionist in a month.       Other orders  -     semaglutide (OZEMPIC) 0.25 mg or 0.5 mg (2 mg/3 mL) pen injector; Inject 0.5 mg into the skin every 7 days.  Dispense: 3 mL; Refill: 1  -     rosuvastatin (CRESTOR) 10 MG tablet; Take 1 tablet (10 mg total) by mouth once daily.  Dispense: 90 tablet; Refill: 3        RTC  in 3 months, sooner if needed and depending on labs.    Mohini Majano MD  Board Certified Internist/Geriatrician  [facility] ([place])

## 2023-07-19 NOTE — TELEPHONE ENCOUNTER
I faxed the order again for the walker and again received fax confirmation. I called and verified that the order was received. Insurance will not cover the cost for the walker for dx of weakness or frequent falls. They will cover it for: CAD, arthritis COPD, fx, cva, or neuropathy.     The patient's cost for the Ozempic after application of the insurance is >$600. They are not able to pay that. Good Rx cost is $905-$1000. Medicare patients are not eligible for patient assistance

## 2023-07-24 ENCOUNTER — TELEPHONE (OUTPATIENT)
Dept: CARDIOLOGY | Facility: HOSPITAL | Age: 63
End: 2023-07-24

## 2023-07-24 NOTE — TELEPHONE ENCOUNTER
Patient advised, test will be at [facility] ([address] [facility]   Will need to register on the first floor at the main entrance.   Patient advised that arrival time is 9:00am.  Patient advised that she may be here about 2.5-3 hours, and may want to bring something to occupy their time, as there will be periods of waiting.    Patient advised, may take her medications prior to testing if you need to.   Advised if she needs to eat to take her medications, please keep it light, like toast and juice.    Patient advised to avoid all caffeine 12 hours prior to testing.  This includes decaf tea and coffee.    Will provide peanut butter crackers for a snack after stress test.  If patient would prefer something else, please bring a snack from home.    Wear comfortable clothing.   No lotions, oils, or powders to the upper chest area. May wear deodorant.    No metal jewelry, buttons, or zippers to the upper body.  Patient verbalizes understanding of instructions.

## 2023-07-25 ENCOUNTER — HOSPITAL ENCOUNTER (OUTPATIENT)
Dept: RADIOLOGY | Facility: HOSPITAL | Age: 63
Discharge: HOME | End: 2023-07-25
Attending: INTERNAL MEDICINE
Payer: MEDICARE

## 2023-07-25 ENCOUNTER — HOSPITAL ENCOUNTER (OUTPATIENT)
Dept: CARDIOLOGY | Facility: HOSPITAL | Age: 63
Discharge: HOME | End: 2023-07-25
Attending: INTERNAL MEDICINE
Payer: MEDICARE

## 2023-07-25 DIAGNOSIS — N18.6 ESRD ON HEMODIALYSIS: ICD-10-CM

## 2023-07-25 DIAGNOSIS — E11.9 TYPE 2 DIABETES MELLITUS WITHOUT COMPLICATION, WITHOUT LONG-TERM CURRENT USE OF INSULIN: ICD-10-CM

## 2023-07-25 DIAGNOSIS — I10 PRIMARY HYPERTENSION: ICD-10-CM

## 2023-07-25 DIAGNOSIS — Z01.818 ENCOUNTER FOR PRE-TRANSPLANT EVALUATION FOR KIDNEY TRANSPLANT: ICD-10-CM

## 2023-07-25 DIAGNOSIS — R00.2 PALPITATIONS: ICD-10-CM

## 2023-07-25 DIAGNOSIS — Z99.2 ESRD ON HEMODIALYSIS: ICD-10-CM

## 2023-07-25 DIAGNOSIS — Z71.89 ENCOUNTER FOR CARDIAC RISK COUNSELING: ICD-10-CM

## 2023-07-25 DIAGNOSIS — Q61.2 ADPKD (AUTOSOMAL DOMINANT POLYCYSTIC KIDNEY): ICD-10-CM

## 2023-07-25 DIAGNOSIS — E66.01 SEVERE OBESITY: ICD-10-CM

## 2023-07-25 DIAGNOSIS — E66.01 MORBID OBESITY: ICD-10-CM

## 2023-07-25 DIAGNOSIS — I31.39 PERICARDIAL EFFUSION: ICD-10-CM

## 2023-07-25 PROCEDURE — 78452 HT MUSCLE IMAGE SPECT MULT: CPT | Mod: 26,,, | Performed by: INTERNAL MEDICINE

## 2023-07-25 PROCEDURE — 78452 HT MUSCLE IMAGE SPECT MULT: CPT

## 2023-07-25 PROCEDURE — A9502 TC99M TETROFOSMIN: HCPCS

## 2023-07-25 PROCEDURE — 93018 CV STRESS TEST I&R ONLY: CPT | Mod: ,,, | Performed by: INTERNAL MEDICINE

## 2023-07-25 PROCEDURE — 93016 NUCLEAR STRESS - CARDIOLOGY INTERPRETED (CUPID ONLY): ICD-10-PCS | Mod: ,,, | Performed by: NURSE PRACTITIONER

## 2023-07-25 PROCEDURE — 93016 CV STRESS TEST SUPVJ ONLY: CPT | Mod: ,,, | Performed by: NURSE PRACTITIONER

## 2023-07-25 PROCEDURE — 78452 NUCLEAR STRESS - CARDIOLOGY INTERPRETED (CUPID ONLY): ICD-10-PCS | Mod: 26,,, | Performed by: INTERNAL MEDICINE

## 2023-07-25 PROCEDURE — 93018 NUCLEAR STRESS - CARDIOLOGY INTERPRETED (CUPID ONLY): ICD-10-PCS | Mod: ,,, | Performed by: INTERNAL MEDICINE

## 2023-07-25 RX ORDER — REGADENOSON 0.08 MG/ML
0.4 INJECTION, SOLUTION INTRAVENOUS ONCE
Status: COMPLETED | OUTPATIENT
Start: 2023-07-25 | End: 2023-07-25

## 2023-07-25 RX ADMIN — REGADENOSON 0.4 MG: 0.08 INJECTION, SOLUTION INTRAVENOUS at 10:07

## 2023-07-26 LAB
CV PHARM DOSE: 0.4 MG
CV STRESS BASE HR: 76 BPM
DIASTOLIC BLOOD PRESSURE: 71 MMHG
EJECTION FRACTION- HIGH: 65 %
END DIASTOLIC INDEX-HIGH: 153 ML/M2
END DIASTOLIC INDEX-LOW: 93 ML/M2
END SYSTOLIC INDEX-HIGH: 71 ML/M2
END SYSTOLIC INDEX-LOW: 31 ML/M2
NUC REST DIASTOLIC VOLUME INDEX: 86
NUC REST EJECTION FRACTION: 66
NUC REST SYSTOLIC VOLUME INDEX: 29
NUC STRESS DIASTOLIC VOLUME INDEX: 95
NUC STRESS EJECTION FRACTION: 62 %
NUC STRESS SYSTOLIC VOLUME INDEX: 36
OHS CV CPX 1 MINUTE RECOVERY HEART RATE: 99 BPM
OHS CV CPX 85 PERCENT MAX PREDICTED HEART RATE MALE: 126
OHS CV CPX MAX PREDICTED HEART RATE: 149
OHS CV CPX PATIENT IS FEMALE: 1
OHS CV CPX PATIENT IS MALE: 0
OHS CV CPX PEAK DIASTOLIC BLOOD PRESSURE: 57 MMHG
OHS CV CPX PEAK HEAR RATE: 99 BPM
OHS CV CPX PEAK RATE PRESSURE PRODUCT: NORMAL
OHS CV CPX PEAK SYSTOLIC BLOOD PRESSURE: 134 MMHG
OHS CV CPX PERCENT MAX PREDICTED HEART RATE ACHIEVED: 67
OHS CV CPX RATE PRESSURE PRODUCT PRESENTING: NORMAL
RETIRED EF AND QEF - SEE NOTES: 53 %
SYSTOLIC BLOOD PRESSURE: 135 MMHG

## 2023-07-27 ENCOUNTER — TELEPHONE (OUTPATIENT)
Dept: GASTROENTEROLOGY | Facility: CLINIC | Age: 63
End: 2023-07-27
Payer: MEDICARE

## 2023-08-02 ENCOUNTER — PATIENT MESSAGE (OUTPATIENT)
Dept: GASTROENTEROLOGY | Facility: CLINIC | Age: 63
End: 2023-08-02
Payer: MEDICARE

## 2023-08-03 ENCOUNTER — PATIENT MESSAGE (OUTPATIENT)
Dept: GASTROENTEROLOGY | Facility: CLINIC | Age: 63
End: 2023-08-03
Payer: MEDICARE

## 2023-08-09 ENCOUNTER — TELEPHONE (OUTPATIENT)
Dept: ADMINISTRATIVE | Facility: CLINIC | Age: 63
End: 2023-08-09
Payer: MEDICARE

## 2023-08-10 ENCOUNTER — OFFICE VISIT (OUTPATIENT)
Dept: FAMILY MEDICINE | Facility: CLINIC | Age: 63
End: 2023-08-10
Payer: MEDICARE

## 2023-08-10 VITALS
BODY MASS INDEX: 45.99 KG/M2 | SYSTOLIC BLOOD PRESSURE: 126 MMHG | HEART RATE: 95 BPM | DIASTOLIC BLOOD PRESSURE: 74 MMHG | HEIGHT: 67 IN | WEIGHT: 293 LBS | OXYGEN SATURATION: 96 %

## 2023-08-10 DIAGNOSIS — E66.01 MORBID OBESITY: ICD-10-CM

## 2023-08-10 DIAGNOSIS — E11.22 TYPE 2 DIABETES MELLITUS WITH CHRONIC KIDNEY DISEASE ON CHRONIC DIALYSIS, WITHOUT LONG-TERM CURRENT USE OF INSULIN: ICD-10-CM

## 2023-08-10 DIAGNOSIS — I31.39 PERICARDIAL EFFUSION: ICD-10-CM

## 2023-08-10 DIAGNOSIS — Z99.2 ESRD ON HEMODIALYSIS: ICD-10-CM

## 2023-08-10 DIAGNOSIS — N18.6 ESRD ON HEMODIALYSIS: ICD-10-CM

## 2023-08-10 DIAGNOSIS — I10 PRIMARY HYPERTENSION: ICD-10-CM

## 2023-08-10 DIAGNOSIS — N18.6 TYPE 2 DIABETES MELLITUS WITH CHRONIC KIDNEY DISEASE ON CHRONIC DIALYSIS, WITHOUT LONG-TERM CURRENT USE OF INSULIN: ICD-10-CM

## 2023-08-10 DIAGNOSIS — Q61.2 ADPKD (AUTOSOMAL DOMINANT POLYCYSTIC KIDNEY): ICD-10-CM

## 2023-08-10 DIAGNOSIS — E78.5 DYSLIPIDEMIA: ICD-10-CM

## 2023-08-10 DIAGNOSIS — Z99.2 TYPE 2 DIABETES MELLITUS WITH CHRONIC KIDNEY DISEASE ON CHRONIC DIALYSIS, WITHOUT LONG-TERM CURRENT USE OF INSULIN: ICD-10-CM

## 2023-08-10 DIAGNOSIS — Z00.00 ENCOUNTER FOR PREVENTIVE HEALTH EXAMINATION: Primary | ICD-10-CM

## 2023-08-10 DIAGNOSIS — N18.6 ESRD (END STAGE RENAL DISEASE): ICD-10-CM

## 2023-08-10 PROBLEM — Z71.89 ENCOUNTER FOR CARDIAC RISK COUNSELING: Status: RESOLVED | Noted: 2023-06-15 | Resolved: 2023-08-10

## 2023-08-10 PROBLEM — Z01.419 ROUTINE GYNECOLOGICAL EXAMINATION: Status: RESOLVED | Noted: 2023-06-01 | Resolved: 2023-08-10

## 2023-08-10 PROCEDURE — 99999 PR PBB SHADOW E&M-EST. PATIENT-LVL IV: ICD-10-PCS | Mod: PBBFAC,,, | Performed by: NURSE PRACTITIONER

## 2023-08-10 PROCEDURE — 99214 OFFICE O/P EST MOD 30 MIN: CPT | Mod: PBBFAC,PO | Performed by: NURSE PRACTITIONER

## 2023-08-10 PROCEDURE — G0402 PR WELCOME MEDICARE PREVENTIVE VISIT NEW ENROLLEE: ICD-10-PCS | Mod: ,,, | Performed by: NURSE PRACTITIONER

## 2023-08-10 PROCEDURE — G0402 INITIAL PREVENTIVE EXAM: HCPCS | Mod: ,,, | Performed by: NURSE PRACTITIONER

## 2023-08-10 PROCEDURE — 99999 PR PBB SHADOW E&M-EST. PATIENT-LVL IV: CPT | Mod: PBBFAC,,, | Performed by: NURSE PRACTITIONER

## 2023-08-10 RX ORDER — LEVOFLOXACIN 250 MG/1
TABLET ORAL
COMMUNITY
Start: 2023-08-03 | End: 2023-08-31

## 2023-08-10 RX ORDER — CIPROFLOXACIN HYDROCHLORIDE 3 MG/ML
SOLUTION/ DROPS OPHTHALMIC
COMMUNITY
Start: 2023-08-03 | End: 2023-08-13

## 2023-08-10 RX ORDER — OMEPRAZOLE 40 MG/1
40 CAPSULE, DELAYED RELEASE ORAL DAILY
Qty: 90 CAPSULE | Refills: 3 | Status: SHIPPED | OUTPATIENT
Start: 2023-08-10

## 2023-08-10 NOTE — PATIENT INSTRUCTIONS
Counseling and Referral of Other Preventative  (Italic type indicates deductible and co-insurance are waived)    Patient Name: Marian Dumont  Today's Date: 8/10/2023    Health Maintenance       Date Due Completion Date    Hepatitis C Screening Never done ---    COVID-19 Vaccine (1) Never done ---    Eye Exam Never done ---    HIV Screening Never done ---    TETANUS VACCINE Never done ---    Colorectal Cancer Screening Never done ---    Shingles Vaccine (1 of 2) Never done ---    Lipid Panel 08/30/2022 8/30/2021    Influenza Vaccine (1) 09/01/2023 ---    Hemoglobin A1c 09/11/2023 3/11/2023    Foot Exam 06/01/2024 6/1/2023    Mammogram 06/13/2024 6/13/2023    DEXA Scan 06/13/2026 6/13/2023        No orders of the defined types were placed in this encounter.    The following information is provided to all patients.  This information is to help you find resources for any of the problems found today that may be affecting your health:                Living healthy guide: www.[facility].[place].[facility]      Understanding Diabetes: www.diabetes.org      Eating healthy: www.cdc.gov/healthyweight      CDC home safety checklist: www.cdc.gov/steadi/patient.html      Agency on Aging: www.goea.[place].gov      Alcoholics anonymous (AA): www.aa.org      Physical Activity: www.christopher.nih.gov/du8qtia      Tobacco use: www.quitwithusla.org

## 2023-08-10 NOTE — PROGRESS NOTES
"  Marian Dumont presented for a  Medicare AWV and comprehensive Health Risk Assessment today. The following components were reviewed and updated:    Medical history  Family History  Social history  Allergies and Current Medications  Health Risk Assessment  Health Maintenance  Care Team     ** See Completed Assessments for Annual Wellness Visit within the encounter summary.**     The following assessments were completed:  Living Situation  CAGE  Depression Screening  Timed Get Up and Go  Whisper Test  Cognitive Function Screening      Nutrition Screening  ADL Screening  PAQ Screening    Review for Opioid Screening: Pt does not have Rx for Opioids  Review for Substance Use Disorders: Patient does not use substance      Vitals:    08/10/23 1435   BP: 126/74   BP Location: Left arm   Patient Position: Sitting   BP Method: Large (Manual)   Pulse: 95   SpO2: 96%   Weight: 134.1 kg (295 lb 10.2 oz)   Height: 5' 6.5" (1.689 m)     Body mass index is 47 kg/m².  Physical Exam  Vitals reviewed.   Constitutional:       Appearance: Normal appearance.   Pulmonary:      Effort: Pulmonary effort is normal. No respiratory distress.   Neurological:      Mental Status: She is alert and oriented to person, place, and time.   Psychiatric:         Mood and Affect: Mood normal.         Behavior: Behavior normal.         Thought Content: Thought content normal.         Judgment: Judgment normal.     Diagnoses and health risks identified today and associated recommendations/orders:    1. Encounter for preventive health examination  Reviewed and discussed health maintenance.    Colonoscopy scheduled 10/12/2023  Will schedule eye exam in [place]    2. Primary hypertension  Stable- continue current treatment and follow up routinely with PCP and cardiology ()    3. Pericardial effusion  Stable- continue current treatment and follow up routinely with PCP and cardiology ()    4. Dyslipidemia  Stable- continue current treatment " and follow up routinely with PCP and cardiology ()    5. ESRD on hemodialysis  Stable- continue current treatment and follow up routinely with PCP and nephrology ([facility] in [place])    6. ESRD (end stage renal disease)  Stable- continue current treatment and follow up routinely with PCP and nephrology ([facility] in [place])    7. ADPKD (autosomal dominant polycystic kidney)  Stable- continue current treatment and follow up routinely with PCP and nephrology ([facility] in [place])    8. Morbid obesity  Encouraged healthy eating, weight loss and routine exercise     9. Type 2 diabetes mellitus with chronic kidney disease on chronic dialysis, without long-term current use of insulin  Stable- continue current treatment and follow up routinely with PCP and nephrology ([facility] in [place])    Provided Marian with a 5-10 year written screening schedule and personal prevention plan. Recommendations were developed using the USPSTF age appropriate recommendations. Education, counseling, and referrals were provided as needed. After Visit Summary printed and given to patient which includes a list of additional screenings\tests needed.    I offered to discuss advanced care planning, including how to pick a person who would make decisions for you if you were unable to make them for yourself, called a health care power of , and what kind of decisions you might make such as use of life sustaining treatments such as ventilators and tube feeding when faced with a life limiting illness recorded on a living will that they will need to know. (How you want to be cared for as you near the end of your natural life)   X Patient is interested in learning more about how to make advanced directives.  I provided them paperwork and offered to discuss this with them.  Eugenie Paz, ALEX

## 2023-08-22 ENCOUNTER — OFFICE VISIT (OUTPATIENT)
Dept: PULMONOLOGY | Facility: CLINIC | Age: 63
End: 2023-08-22
Payer: MEDICARE

## 2023-08-22 VITALS
WEIGHT: 292.13 LBS | DIASTOLIC BLOOD PRESSURE: 84 MMHG | SYSTOLIC BLOOD PRESSURE: 186 MMHG | HEART RATE: 95 BPM | OXYGEN SATURATION: 98 % | BODY MASS INDEX: 46.44 KG/M2

## 2023-08-22 DIAGNOSIS — G47.33 OSA (OBSTRUCTIVE SLEEP APNEA): Primary | ICD-10-CM

## 2023-08-22 PROCEDURE — 99214 PR OFFICE/OUTPT VISIT, EST, LEVL IV, 30-39 MIN: ICD-10-PCS | Mod: S$PBB,,, | Performed by: INTERNAL MEDICINE

## 2023-08-22 PROCEDURE — 99999 PR PBB SHADOW E&M-EST. PATIENT-LVL III: ICD-10-PCS | Mod: PBBFAC,,, | Performed by: INTERNAL MEDICINE

## 2023-08-22 PROCEDURE — 99999 PR PBB SHADOW E&M-EST. PATIENT-LVL III: CPT | Mod: PBBFAC,,, | Performed by: INTERNAL MEDICINE

## 2023-08-22 PROCEDURE — 99213 OFFICE O/P EST LOW 20 MIN: CPT | Mod: PBBFAC,PO | Performed by: INTERNAL MEDICINE

## 2023-08-22 PROCEDURE — 99214 OFFICE O/P EST MOD 30 MIN: CPT | Mod: S$PBB,,, | Performed by: INTERNAL MEDICINE

## 2023-08-22 RX ORDER — ESZOPICLONE 3 MG/1
3 TABLET, FILM COATED ORAL NIGHTLY
Qty: 14 TABLET | Refills: 0 | Status: CANCELLED | OUTPATIENT
Start: 2023-08-22 | End: 2023-09-05

## 2023-08-22 NOTE — PROGRESS NOTES
"Pulmonary Clinic Follow Up    HISTORY OF PRESENT ILLNESS   Marian Dumont is a 63 y.o. female with a PMH of ADPKD, ESRD, HTN, and DM2 on whom we have been consulted for possible chronic hypercapnic respiratory failure.    I saw her inpatient at [facility] on 3/11/23 while she was admitted for SOB. She got dialysis right at admission then.    23: Sleep study indicated significant JADE. CPAP titration performed and CPAP ordered on 23. The patient has been using the CPAP 2-6 hours per night. She still complains that the full mask is uncomfortable.      SMOKING HISTORY  Never smoker.     EXPOSURE HISTORY  None.       REVIEW OF SYSTEMS  GENERAL: Feeling well. No fevers, chills, or night sweats.  EYES: Vision is good.  ENT: No sinusitis or pharyngitis.   HEART: No chest pain or palpitations.Endorses orthopnea that is worse on dialysis days.  LUNGS: Daily cough productive of clear sputum.  GI: No abdominal pain, nausea, vomiting, or diarrhea.  : No dysuria, urgency, or frequency.  SKIN: No lesions or rashes.  MUSCULOSKELETAL: No joint pain or myalgias.  NEURO: No headaches or neuropathy.  LYMPH: No edema or adenopathy.  PSYCH: No anxiety or depression.  ENDO: No unexpected weight change.    PFSH:  Past Medical History:   Diagnosis Date    Anemia     Coronary artery disease     COVID-19     Diabetes mellitus     Diabetes mellitus, type 2     Dialysis patient     Disorder of kidney and ureter     GERD (gastroesophageal reflux disease)     Hypertension     Mixed hyperlipidemia     Obese body habitus          Past Surgical History:   Procedure Laterality Date    BRAIN SURGERY       SECTION  1998    CHOLECYSTECTOMY      PERICARDIAL WINDOW  2015    TOTAL ABDOMINAL HYSTERECTOMY  1998    Done after C/S. Ovaries conserved. Due to "cysts on the uterus"     Social History     Tobacco Use    Smoking status: Never    Smokeless tobacco: Never   Substance Use Topics    Alcohol use: Never    Drug use: Never "     Family History   Problem Relation Age of Onset    Heart disease Mother     Diabetes Mother     COPD Mother     Lung cancer Mother 85        lung CA    Heart disease Father     Stroke Father         cerebral aneurysm    Early death Father     Diabetes Sister     COPD Sister     Lung cancer Sister         lung CA    Depression Daughter     Colon cancer Maternal Aunt         colon CA     Review of patient's allergies indicates:   Allergen Reactions    Ace inhibitors Anaphylaxis and Swelling     Other reaction(s): Angioedema           VITAL SIGNS (MOST RECENT)       PHYSICAL EXAM  GENERAL: NAD.  HEENT: Pupils equal and reactive. Extraocular movements intact.   NECK: Supple.   HEART: Regular rate and rhythm. No murmur or gallop auscultated.  LUNGS: Clear to auscultation and percussion. Lung excursion symmetrical.  ABDOMEN: Bowel sounds present. Non-tender, no masses palpated.  EXTREMITIES: Normal muscle tone and joint movement, no cyanosis or clubbing.   LYMPHATICS: Trace pretibial pitting edema bilaterally.  SKIN: Dry, intact, no lesions.   NEURO: No gross cognitive or motor deficits.  PSYCH: Appropriate affect.    Lab Results   Component Value Date    WBC 8.74 03/11/2023    HGB 10.4 (L) 03/11/2023    HCT 33.0 (L) 03/11/2023     03/11/2023    ALT 10 03/10/2023    AST 13 03/10/2023     03/11/2023    K 4.6 03/11/2023    CL 95 03/11/2023    CREATININE 8.9 (H) 03/11/2023    BUN 42 (H) 03/11/2023    CO2 28 03/11/2023    TSH 3.900 03/10/2023    INR 1.0 03/10/2023    HGBA1C 7.4 (H) 03/11/2023       LAST ARTERIAL BLOOD GAS  @LABRCNTIP[PH,PO2,PCO2,HCO3,BE@      LAST 7 DAYS MICROBIOLOGY   Microbiology Results (last 7 days)       ** No results found for the last 168 hours. **            MOST RECENT IMAGING  Nuclear Stress - Cardiology Interpreted    Normal myocardial perfusion scan. There is no evidence of myocardial   ischemia or infarction.    There is a trivial to mild intensity fixed perfusion abnormality in  the   inferolateral wall of the left ventricle, secondary to soft tissue   attenuation.    The gated perfusion images showed an ejection fraction of 66% at rest.   The gated perfusion images showed an ejection fraction of 62% post stress.   Normal ejection fraction is greater than 53%.    There is normal wall motion at rest and post stress.    LV cavity size is normal at rest and normal at stress.    The ECG portion of the study is negative for ischemia.    The patient reported no chest pain during the stress test.    There were no arrhythmias during stress.      CURRENT VISIT EKG  No results found for this visit on 04/20/23.    ECHOCARDIOGRAM RESULTS  Results for orders placed during the hospital encounter of 01/07/23    Echo    Interpretation Summary  · The left ventricle is normal in size with moderate concentric hypertrophy and normal systolic function.  · Moderate left atrial enlargement.  · The estimated ejection fraction is 65%.  · Normal left ventricular diastolic function.  · Mild mitral regurgitation.  · Mild right atrial enlargement.  · Normal right ventricular size with normal right ventricular systolic function.  · Normal central venous pressure (3 mmHg).      Pulmonary Function Tests    Per my personal interpretation, these PFTs show no obstruction, mild restriction likely due to body habitus, and low diffusion capacity that corrects when adjusted for Va. There is not likely any intrinsic lung disease.    I have personally reviewed recent labs and ABGs, and I have viewed the images of recent chest x-rays and chest CTs.    Her recent ABG (4/18/23) shows a mild metabolic alkalosis, as well as a(n over)compensated respiratory acidosis. This is consistent with a chronic respiratory acidosis. The overcompensation is likely due to the heavy breathing for PFTs that were performed immediately beforehand.    ASSESSMENT & PLAN   Marian Dumont is a 63 y.o. female with a PMH of ESRD, HTN, and DM2 on whom we have  been consulted for shortness of breath and concern for JADE/OHS.    #JADE  #OHS  #Chronic hypercapnic respiratory failure  #Very severe obesity  - CPAP  - patient dissatisfied with the 2 types of mask she tried  - patient to try different types of masks with [employer]    Follow up in 12 months.    Kendall Douglas MD  Pulmonary and Critical Care Medicine  [facility]  Date of Service: 08/22/2023  2:36 PM

## 2023-08-25 ENCOUNTER — TELEPHONE (OUTPATIENT)
Dept: PRIMARY CARE CLINIC | Facility: CLINIC | Age: 63
End: 2023-08-25
Payer: MEDICARE

## 2023-08-25 DIAGNOSIS — N18.6 ESRD (END STAGE RENAL DISEASE): Primary | ICD-10-CM

## 2023-08-25 DIAGNOSIS — M19.90 ARTHRITIS: ICD-10-CM

## 2023-08-25 NOTE — TELEPHONE ENCOUNTER
Spoke to patient - has not received walker and has not heard from anyone. Am checking on why she has npt received the walker.

## 2023-08-25 NOTE — TELEPHONE ENCOUNTER
[facility] states they did not receive the order for the walker that was faxed on 7/19/23. The order was re-faxed to [phone].

## 2023-08-25 NOTE — TELEPHONE ENCOUNTER
[facility] states they did not receive the order for the walker that was faxed on 7/19/23. The order was re-faxed to [phone].

## 2023-08-25 NOTE — TELEPHONE ENCOUNTER
Follow up with patient and see if she received her walker. Last time I saw her she still had not. Let me know what we need to do to get her one.   [email or web address]

## 2023-08-28 NOTE — TELEPHONE ENCOUNTER
The patient will not qualify for a heavy duty walker as she is now <300#. They said to just order a rolling walker with wheels.

## 2023-08-28 NOTE — TELEPHONE ENCOUNTER
Insurance will not cover a walker fir the dx of obesity. They will pay for it for dx of ESRD. Also, the order for walker must specify Rollator, rolling walker, etc.

## 2023-08-29 ENCOUNTER — HOSPITAL ENCOUNTER (OUTPATIENT)
Dept: CARDIOLOGY | Facility: HOSPITAL | Age: 63
Discharge: HOME | End: 2023-08-29
Attending: INTERNAL MEDICINE
Payer: MEDICARE

## 2023-08-29 ENCOUNTER — TELEPHONE (OUTPATIENT)
Dept: PRIMARY CARE CLINIC | Facility: CLINIC | Age: 63
End: 2023-08-29
Payer: MEDICARE

## 2023-08-29 VITALS — WEIGHT: 293 LBS | HEIGHT: 66 IN | BODY MASS INDEX: 47.09 KG/M2

## 2023-08-29 DIAGNOSIS — E66.01 MORBID OBESITY: ICD-10-CM

## 2023-08-29 DIAGNOSIS — E11.9 TYPE 2 DIABETES MELLITUS WITHOUT COMPLICATION, WITHOUT LONG-TERM CURRENT USE OF INSULIN: ICD-10-CM

## 2023-08-29 DIAGNOSIS — Z71.89 ENCOUNTER FOR CARDIAC RISK COUNSELING: ICD-10-CM

## 2023-08-29 DIAGNOSIS — Q61.2 ADPKD (AUTOSOMAL DOMINANT POLYCYSTIC KIDNEY): ICD-10-CM

## 2023-08-29 DIAGNOSIS — I31.39 PERICARDIAL EFFUSION: ICD-10-CM

## 2023-08-29 DIAGNOSIS — N18.6 ESRD ON HEMODIALYSIS: ICD-10-CM

## 2023-08-29 DIAGNOSIS — I10 PRIMARY HYPERTENSION: ICD-10-CM

## 2023-08-29 DIAGNOSIS — Z99.2 ESRD ON HEMODIALYSIS: ICD-10-CM

## 2023-08-29 DIAGNOSIS — Z01.818 ENCOUNTER FOR PRE-TRANSPLANT EVALUATION FOR KIDNEY TRANSPLANT: ICD-10-CM

## 2023-08-29 DIAGNOSIS — R00.2 PALPITATIONS: ICD-10-CM

## 2023-08-29 DIAGNOSIS — E66.01 SEVERE OBESITY: ICD-10-CM

## 2023-08-29 LAB
AORTIC ROOT ANNULUS: 2.8 CM
AORTIC VALVE CUSP SEPERATION: 1.7 CM
AV INDEX (PROSTH): 0.73
AV MEAN GRADIENT: 3 MMHG
AV PEAK GRADIENT: 5 MMHG
AV VALVE AREA BY VELOCITY RATIO: 2.2 CM²
AV VALVE AREA: 2.54 CM²
AV VELOCITY RATIO: 0.63
BSA FOR ECHO PROCEDURE: 2.52 M2
CV ECHO LV RWT: 1.17 CM
DOP CALC AO PEAK VEL: 1.15 M/S
DOP CALC AO VTI: 17.2 CM
DOP CALC LVOT AREA: 3.5 CM2
DOP CALC LVOT DIAMETER: 2.1 CM
DOP CALC LVOT PEAK VEL: 0.73 M/S
DOP CALC LVOT STROKE VOLUME: 43.62 CM3
DOP CALCLVOT PEAK VEL VTI: 12.6 CM
E WAVE DECELERATION TIME: 153 MSEC
E/A RATIO: 0.71
E/E' RATIO: 10.17 M/S
ECHO LV POSTERIOR WALL: 2.08 CM (ref 0.6–1.1)
EJECTION FRACTION: 65 %
FRACTIONAL SHORTENING: 35 % (ref 28–44)
INTERVENTRICULAR SEPTUM: 2.06 CM (ref 0.6–1.1)
IVRT: 100 MSEC
LEFT ATRIUM SIZE: 4.2 CM
LEFT INTERNAL DIMENSION IN SYSTOLE: 2.32 CM (ref 2.1–4)
LEFT VENTRICLE DIASTOLIC VOLUME INDEX: 22.27 ML/M2
LEFT VENTRICLE DIASTOLIC VOLUME: 53 ML
LEFT VENTRICLE MASS INDEX: 144 G/M2
LEFT VENTRICLE SYSTOLIC VOLUME INDEX: 7.8 ML/M2
LEFT VENTRICLE SYSTOLIC VOLUME: 18.5 ML
LEFT VENTRICULAR INTERNAL DIMENSION IN DIASTOLE: 3.56 CM (ref 3.5–6)
LEFT VENTRICULAR MASS: 342.9 G
LV LATERAL E/E' RATIO: 8.71 M/S
LV SEPTAL E/E' RATIO: 12.2 M/S
LVOT MG: 1 MMHG
LVOT MV: 0.5 CM/S
MV PEAK A VEL: 0.86 M/S
MV PEAK E VEL: 0.61 M/S
MV STENOSIS PRESSURE HALF TIME: 49 MS
MV VALVE AREA P 1/2 METHOD: 4.49 CM2
PISA TR MAX VEL: 1.97 M/S
RA PRESSURE ESTIMATED: 3 MMHG
RIGHT VENTRICULAR END-DIASTOLIC DIMENSION: 2.07 CM
RV TB RVSP: 5 MMHG
TDI LATERAL: 0.07 M/S
TDI SEPTAL: 0.05 M/S
TDI: 0.06 M/S
TR MAX PG: 16 MMHG
TV REST PULMONARY ARTERY PRESSURE: 19 MMHG
Z-SCORE OF LEFT VENTRICULAR DIMENSION IN END DIASTOLE: -10.78
Z-SCORE OF LEFT VENTRICULAR DIMENSION IN END SYSTOLE: -7.87

## 2023-08-29 PROCEDURE — 93306 ECHO (CUPID ONLY): ICD-10-PCS | Mod: 26,,, | Performed by: INTERNAL MEDICINE

## 2023-08-29 PROCEDURE — 93306 TTE W/DOPPLER COMPLETE: CPT | Mod: 26,,, | Performed by: INTERNAL MEDICINE

## 2023-08-29 PROCEDURE — 93306 TTE W/DOPPLER COMPLETE: CPT

## 2023-08-29 NOTE — TELEPHONE ENCOUNTER
----- Message from Mohini Majano MD sent at 8/29/2023  9:52 AM CDT -----  Hgb a1c looks amazing!!!. This is where to want to keep this. Very well conrtolled. How is she doing with the wieght loss?

## 2023-08-31 ENCOUNTER — OFFICE VISIT (OUTPATIENT)
Dept: CARDIOLOGY | Facility: CLINIC | Age: 63
End: 2023-08-31
Payer: MEDICARE

## 2023-08-31 ENCOUNTER — PATIENT MESSAGE (OUTPATIENT)
Dept: PRIMARY CARE CLINIC | Facility: CLINIC | Age: 63
End: 2023-08-31
Payer: MEDICARE

## 2023-08-31 VITALS
DIASTOLIC BLOOD PRESSURE: 78 MMHG | OXYGEN SATURATION: 96 % | HEIGHT: 66 IN | WEIGHT: 288 LBS | BODY MASS INDEX: 46.28 KG/M2 | HEART RATE: 82 BPM | SYSTOLIC BLOOD PRESSURE: 134 MMHG

## 2023-08-31 DIAGNOSIS — N18.6 ESRD ON HEMODIALYSIS: ICD-10-CM

## 2023-08-31 DIAGNOSIS — I10 PRIMARY HYPERTENSION: ICD-10-CM

## 2023-08-31 DIAGNOSIS — I42.2 HYPERTROPHIC CARDIOMYOPATHY: Primary | ICD-10-CM

## 2023-08-31 DIAGNOSIS — E11.9 TYPE 2 DIABETES MELLITUS WITHOUT COMPLICATION, WITHOUT LONG-TERM CURRENT USE OF INSULIN: ICD-10-CM

## 2023-08-31 DIAGNOSIS — R00.2 PALPITATIONS: ICD-10-CM

## 2023-08-31 DIAGNOSIS — R60.0 BILATERAL LOWER EXTREMITY EDEMA: ICD-10-CM

## 2023-08-31 DIAGNOSIS — Z01.818 ENCOUNTER FOR PRE-TRANSPLANT EVALUATION FOR KIDNEY TRANSPLANT: ICD-10-CM

## 2023-08-31 DIAGNOSIS — Z99.2 ESRD ON HEMODIALYSIS: ICD-10-CM

## 2023-08-31 DIAGNOSIS — E66.01 SEVERE OBESITY: ICD-10-CM

## 2023-08-31 DIAGNOSIS — I31.39 PERICARDIAL EFFUSION: ICD-10-CM

## 2023-08-31 PROCEDURE — 99215 PR OFFICE/OUTPT VISIT, EST, LEVL V, 40-54 MIN: ICD-10-PCS | Mod: S$PBB,,, | Performed by: INTERNAL MEDICINE

## 2023-08-31 PROCEDURE — 99999 PR PBB SHADOW E&M-EST. PATIENT-LVL IV: CPT | Mod: PBBFAC,,, | Performed by: INTERNAL MEDICINE

## 2023-08-31 PROCEDURE — 99214 OFFICE O/P EST MOD 30 MIN: CPT | Mod: PBBFAC,PN | Performed by: INTERNAL MEDICINE

## 2023-08-31 PROCEDURE — 99215 OFFICE O/P EST HI 40 MIN: CPT | Mod: S$PBB,,, | Performed by: INTERNAL MEDICINE

## 2023-08-31 PROCEDURE — 99999 PR PBB SHADOW E&M-EST. PATIENT-LVL IV: ICD-10-PCS | Mod: PBBFAC,,, | Performed by: INTERNAL MEDICINE

## 2023-08-31 NOTE — PROGRESS NOTES
"Subjective:    Patient ID:  Marian Dumont is a 63 y.o. female     Chief Complaint   Patient presents with    Results     Echo , Cardiac monitor, and Stress test        HPI:  Ms Marian Dumont is a 63 y.o. female patient is here for follow-up.  She had a stress test and echocardiogram and a Holter monitor done and she is here for the results.    Her breathing is good at the present time denies any chest pain or tightness heaviness denies any dizziness or lightheadedness denies any loss of consciousness falls or head injury.    She is taking all her medications regularly.        Review of patient's allergies indicates:   Allergen Reactions    Ace inhibitors Anaphylaxis and Swelling     Other reaction(s): Angioedema         Past Medical History:   Diagnosis Date    Anemia     Coronary artery disease     COVID-19     Diabetes mellitus     Diabetes mellitus, type 2     Dialysis patient     Disorder of kidney and ureter     GERD (gastroesophageal reflux disease)     Hypertension     Mixed hyperlipidemia     Obese body habitus      Past Surgical History:   Procedure Laterality Date    BRAIN SURGERY       SECTION  1998    CHOLECYSTECTOMY      PERICARDIAL WINDOW  2015    TOTAL ABDOMINAL HYSTERECTOMY  1998    Done after C/S. Ovaries conserved. Due to "cysts on the uterus"     Social History     Tobacco Use    Smoking status: Never    Smokeless tobacco: Never   Substance Use Topics    Alcohol use: Never    Drug use: Never     Family History   Problem Relation Age of Onset    Heart disease Mother     Diabetes Mother     COPD Mother     Lung cancer Mother 85        lung CA    Heart disease Father     Stroke Father         cerebral aneurysm    Early death Father     Diabetes Sister     COPD Sister     Lung cancer Sister         lung CA    Depression Daughter     Colon cancer Maternal Aunt         colon CA        Review of Systems:   Constitution: Negative for diaphoresis and fever.   HEENT: Negative for " nosebleeds.    Cardiovascular: Negative for chest pain       No dyspnea on exertion       No leg swelling        No palpitations  Respiratory: Negative for shortness of breath and wheezing.    Hematologic/Lymphatic: Negative for bleeding problem. Does not bruise/bleed easily.   Skin: Negative for color change and rash.   Musculoskeletal: Negative for falls and myalgias.   Gastrointestinal: Negative for hematemesis and hematochezia.   Genitourinary: Negative for hematuria.   Neurological: Negative for dizziness and light-headedness.   Psychiatric/Behavioral: Negative for altered mental status and memory loss.          Objective:        Vitals:    08/31/23 1036   BP: 134/78   Pulse: 82       Lab Results   Component Value Date    WBC 8.74 03/11/2023    HGB 10.4 (L) 03/11/2023    HCT 33.0 (L) 03/11/2023     03/11/2023    CHOL 125 08/29/2023    TRIG 111 08/29/2023    HDL 42 08/29/2023    ALT 10 03/10/2023    AST 13 03/10/2023     03/11/2023    K 4.6 03/11/2023    CL 95 03/11/2023    CREATININE 8.9 (H) 03/11/2023    BUN 42 (H) 03/11/2023    CO2 28 03/11/2023    TSH 3.900 03/10/2023    INR 1.0 03/10/2023    HGBA1C 5.8 08/29/2023        ECHOCARDIOGRAM RESULTS  Results for orders placed during the hospital encounter of 08/29/23    Echo    Interpretation Summary    Left Ventricle: The left ventricle is normal in size. Increased ventricular mass. Severely increased wall thickness. There is severe concentric hypertrophy. Normal wall motion. There is normal systolic function. Ejection fraction by visual approximation is 65%. There is normal diastolic function.    Left Atrium: Left atrium is mildly dilated.    Right Ventricle: Normal right ventricular cavity size. Wall thickness is normal. Right ventricle wall motion  is normal. Systolic function is normal.    IVC/SVC: Normal venous pressure at 3 mmHg.        CURRENT/PREVIOUS VISIT EKG  Results for orders placed or performed during the hospital encounter of 03/10/23    EKG 12-lead    Collection Time: 03/10/23  2:11 PM    Narrative    Test Reason : R06.02,    Vent. Rate : 089 BPM     Atrial Rate : 089 BPM     P-R Int : 144 ms          QRS Dur : 080 ms      QT Int : 410 ms       P-R-T Axes : 029 -24 011 degrees     QTc Int : 498 ms    Normal sinus rhythm  Minimal voltage criteria for LVH, may be normal variant ( R in aVL )  Anterolateral infarct ,age undetermined  Abnormal ECG  When compared with ECG of 07-JAN-2023 19:16,  Anterolateral infarct is now Present  Nonspecific T wave abnormality now evident in Inferior leads  Confirmed by Brett Howell MD ([id number]) on 3/13/2023 4:37:15 PM    Referred By:             Confirmed By:Brett Howell MD     No valid procedures specified.   Results for orders placed during the hospital encounter of 07/25/23    Nuclear Stress - Cardiology Interpreted    Interpretation Summary    Normal myocardial perfusion scan. There is no evidence of myocardial ischemia or infarction.    There is a trivial to mild intensity fixed perfusion abnormality in the inferolateral wall of the left ventricle, secondary to soft tissue attenuation.    The gated perfusion images showed an ejection fraction of 66% at rest. The gated perfusion images showed an ejection fraction of 62% post stress. Normal ejection fraction is greater than 53%.    There is normal wall motion at rest and post stress.    LV cavity size is normal at rest and normal at stress.    The ECG portion of the study is negative for ischemia.    The patient reported no chest pain during the stress test.    There were no arrhythmias during stress.      Physical Exam:  CONSTITUTIONAL: No fever, no chills  HEENT: Normocephalic, atraumatic,pupils reactive to light                 NECK:  No JVD no carotid bruit  CVS: S1S2+, RRR, systolic murmurs,   LUNGS: Clear  ABDOMEN: Soft, NT, BS+  EXTREMITIES: No cyanosis, edema  : No last catheter  NEURO: AAO X 3  PSY: Normal affect      Medication List with  Changes/Refills   Current Medications    AMLODIPINE (NORVASC) 5 MG TABLET    Take 5 mg by mouth once daily.    ASPIRIN (ECOTRIN) 81 MG EC TABLET    Take 81 mg by mouth once daily.    CALCITRIOL (ROCALTROL) 0.25 MCG CAP    Take 0.25 mcg by mouth once daily.    CARVEDILOL (COREG) 12.5 MG TABLET    Take 12.5 mg by mouth 2 (two) times daily with meals.    CETIRIZINE (ZYRTEC) 10 MG TABLET    Take 10 mg by mouth once daily.    CINACALCET (SENSIPAR) 30 MG TAB    Take 30 mg by mouth Daily.    LIDOCAINE-PRILOCAINE (EMLA) CREAM    Apply 1 each topically as needed.    OMEPRAZOLE (PRILOSEC) 40 MG CAPSULE    Take 1 capsule (40 mg total) by mouth once daily.    ONDANSETRON (ZOFRAN) 4 MG TABLET    Take 1 tablet (4 mg total) by mouth every 6 (six) hours as needed for Nausea.    ROSUVASTATIN (CRESTOR) 10 MG TABLET    Take 1 tablet (10 mg total) by mouth once daily.    WALKER MISC    1 each by Misc.(Non-Drug; Combo Route) route once daily.   Discontinued Medications    FLUTICASONE PROPIONATE (FLONASE) 50 MCG/ACTUATION NASAL SPRAY    1 spray by Each Nostril route 2 (two) times a day.    IPRATROPIUM (ATROVENT) 42 MCG (0.06 %) NASAL SPRAY    2 sprays by Each Nostril route 2 (two) times daily.    LEVOFLOXACIN (LEVAQUIN) 250 MG TABLET    You need to take a dose of this medication every 48 hours based on your dialysis.  Take 2 pills on the first day and then starting on day 3 take 1 pill every 2 days until they are gone.  This will be a 10-day course.    SEMAGLUTIDE (OZEMPIC) 0.25 MG OR 0.5 MG (2 MG/3 ML) PEN INJECTOR    Inject 0.5 mg into the skin every 7 days.             Assessment:       1. Hypertrophic cardiomyopathy    2. ESRD on hemodialysis    3. Bilateral lower extremity edema    4. Primary hypertension    5. Type 2 diabetes mellitus without complication, without long-term current use of insulin    6. Pericardial effusion    7. Encounter for pre-transplant evaluation for kidney transplant    8. Severe obesity    9. Palpitations          Plan:   1. Hypertrophic cardiomyopathy   Patient has hypertrophic heart disease seen on the echocardiogram essentially in normally functioning with normal wall motion.  2. Myocardial perfusion study   Patient underwent myocardial perfusion study and she has normal perfusion without any reversible ischemia with normal ejection fraction and normal wall motion.    3. Patient has been having intermittent palpitations.  And she had been having intermittent PACs in rare PVCs.    4. Essential hypertension   Her blood pressure is stable at 130 4/78 and she is on amlodipine 5 mg p.o. q.day carvedilol 12.5 mg p.o. b.i.d. continue the same.    5. Diabetes mellitus   At the present time her diabetes appears to be better controlled.  She follows up with the primary physician in regards with.    6. End-stage renal disease on hemodialysis   Patient has been doing well post dialysis.  And she is currently being evaluated for transplant.    7. Pre transplant evaluation   Patient to proceed with the cardiac transplant and thus far the testing has been negative for any ischemia.  8. Severe obesity   Patient in the past few weeks has lost about 12 lb.  And she has been doing very well with weight loss.  Patient to continue to eat healthy and continue to watch of wait closely.    9. Mixed hyperlipidemia   Patient is currently on rosuvastatin 10 mg p.o. q.day continue the same.  And a primary physician is checking her cholesterol and liver function test  11. I will see her back in the office in 6 months time.              Problem List Items Addressed This Visit          Cardiac/Vascular    Primary hypertension    Overview     BP well controlled today  Continue with current regimen          Pericardial effusion       Renal/    ESRD on hemodialysis     Other Visit Diagnoses       Hypertrophic cardiomyopathy    -  Primary    Bilateral lower extremity edema        Type 2 diabetes mellitus without complication, without long-term  current use of insulin        Encounter for pre-transplant evaluation for kidney transplant        Severe obesity        Palpitations                No follow-ups on file.

## 2023-09-01 NOTE — TELEPHONE ENCOUNTER
This has been faxed. She should be getting her walker today. I plan to call later to check on the delivery.

## 2023-09-05 ENCOUNTER — DOCUMENTATION ONLY (OUTPATIENT)
Dept: PRIMARY CARE CLINIC | Facility: CLINIC | Age: 63
End: 2023-09-05

## 2023-09-05 ENCOUNTER — OFFICE VISIT (OUTPATIENT)
Dept: PRIMARY CARE CLINIC | Facility: CLINIC | Age: 63
End: 2023-09-05
Payer: MEDICARE

## 2023-09-05 VITALS
TEMPERATURE: 99 F | DIASTOLIC BLOOD PRESSURE: 70 MMHG | OXYGEN SATURATION: 95 % | SYSTOLIC BLOOD PRESSURE: 136 MMHG | RESPIRATION RATE: 18 BRPM | WEIGHT: 287.13 LBS | HEIGHT: 66 IN | BODY MASS INDEX: 46.15 KG/M2 | HEART RATE: 82 BPM

## 2023-09-05 DIAGNOSIS — I10 PRIMARY HYPERTENSION: ICD-10-CM

## 2023-09-05 DIAGNOSIS — Z99.2 ESRD ON HEMODIALYSIS: ICD-10-CM

## 2023-09-05 DIAGNOSIS — E11.9 TYPE 2 DIABETES MELLITUS WITHOUT COMPLICATION, WITHOUT LONG-TERM CURRENT USE OF INSULIN: ICD-10-CM

## 2023-09-05 DIAGNOSIS — E66.01 MORBID OBESITY: Primary | ICD-10-CM

## 2023-09-05 DIAGNOSIS — N18.6 ESRD ON HEMODIALYSIS: ICD-10-CM

## 2023-09-05 PROCEDURE — 99999 PR PBB SHADOW E&M-EST. PATIENT-LVL IV: CPT | Mod: PBBFAC,,, | Performed by: INTERNAL MEDICINE

## 2023-09-05 PROCEDURE — 99214 PR OFFICE/OUTPT VISIT, EST, LEVL IV, 30-39 MIN: ICD-10-PCS | Mod: S$PBB,,, | Performed by: INTERNAL MEDICINE

## 2023-09-05 PROCEDURE — 99999 PR PBB SHADOW E&M-EST. PATIENT-LVL IV: ICD-10-PCS | Mod: PBBFAC,,, | Performed by: INTERNAL MEDICINE

## 2023-09-05 PROCEDURE — 99214 OFFICE O/P EST MOD 30 MIN: CPT | Mod: S$PBB,,, | Performed by: INTERNAL MEDICINE

## 2023-09-05 PROCEDURE — 99214 OFFICE O/P EST MOD 30 MIN: CPT | Mod: PBBFAC,PN | Performed by: INTERNAL MEDICINE

## 2023-09-05 RX ORDER — SAXAGLIPTIN 2.5 MG/1
2.5 TABLET, FILM COATED ORAL DAILY
Qty: 90 TABLET | Refills: 3 | Status: SHIPPED | OUTPATIENT
Start: 2023-09-05 | End: 2024-09-04

## 2023-09-05 RX ORDER — TIRZEPATIDE 5 MG/.5ML
5 INJECTION, SOLUTION SUBCUTANEOUS
Qty: 4 PEN | Refills: 3 | Status: SHIPPED | OUTPATIENT
Start: 2023-09-05 | End: 2023-12-05 | Stop reason: SDUPTHER

## 2023-09-05 NOTE — PROGRESS NOTES
INTERNAL MEDICINE PROGRESS/URGENT CARE NOTE    CHIEF COMPLAINT     Chief Complaint   Patient presents with    Follow-up     Medication review for ozempic and affordability of the medication        HPI     Marian Dumont is a 63 y.o.  female who presents with a PMHx of  EDRD on HD, DM2, HLD, HTN, anemia, GERD, Morbid obesity, who presents today for  an ollow up visit today.  Here for weight check   Trying to work on weight  loss so she can be referred for kidney transplant.   Went to one session of the therapy and canceled her next appointment  No notable weight loss on her chart here but says she has lost some weight on her scale at home  Having trouble with her diet because everything weve told her to eat she says is affecting her lytes at dialysis. Told her to eat yogurt and blueberries, she does and the nutritionist told her it was raising her calcium too high  Previous weight was 308, today 288. Shes lostalmost 20 pounds in 3 months !!!    [facility] and to keep the weight off     Hgb a1c has improved frmo 7.4 down to 5.8!!! Wonderful.   She is now scheduled to have her colonoscopy and she has her eye eye exam scheduled to be completed.     Home Medications:  Prior to Admission medications    Medication Sig Start Date End Date Taking? Authorizing Provider   amLODIPine (NORVASC) 5 MG tablet Take 5 mg by mouth once daily.    Provider, Historical   aspirin (ECOTRIN) 81 MG EC tablet Take 81 mg by mouth once daily.    Provider, Historical   calcitRIOL (ROCALTROL) 0.25 MCG Cap Take 0.25 mcg by mouth once daily.    Provider, Historical   carvediloL (COREG) 12.5 MG tablet Take 12.5 mg by mouth 2 (two) times daily with meals.    Provider, Historical   cetirizine (ZYRTEC) 10 MG tablet Take 10 mg by mouth once daily.    Provider, Historical   cinacalcet (SENSIPAR) 30 MG Tab Take 30 mg by mouth Daily. 1/18/23   Provider, Historical   LIDOcaine-prilocaine (EMLA) cream Apply 1 each topically  "as needed. 1/26/23   Provider, Historical   omeprazole (PRILOSEC) 40 MG capsule Take 1 capsule (40 mg total) by mouth once daily. 8/10/23   Eugenie Paz, NP   ondansetron (ZOFRAN) 4 MG tablet Take 1 tablet (4 mg total) by mouth every 6 (six) hours as needed for Nausea. 3/12/23   Sean العلي MD   rosuvastatin (CRESTOR) 10 MG tablet Take 1 tablet (10 mg total) by mouth once daily. 7/18/23   Mohini Majano MD   walker Misc 1 each by Misc.(Non-Drug; Combo Route) route once daily.  Patient not taking: Reported on 8/31/2023 8/28/23   Mohini Majano MD       Review of Systems:  Review of Systems        Advance Care Planning     Date: 09/05/2023    Power of   I initiated the process of voluntary advance care planning today and explained the importance of this process to the patient.  I introduced the concept of advance directives to the patient, as well. Then the patient received detailed information about the importance of designating a Health Care Power of  (HCPOA). She was also instructed to communicate with this person about their wishes for future healthcare, should she become sick and lose decision-making capacity. The patient has previously appointed a HCPOA. After our discussion, the patient has decided to complete a HCPOA and has appointed her daughter, health care agent:  name on file  & health care agent number:  on file  I spent a total time of 10 minutes discussing this issue with the patient.                PHYSICAL EXAM     /70 (BP Location: Right forearm, Patient Position: Sitting, BP Method: Medium (Manual))   Pulse 82   Temp 98.7 °F (37.1 °C) (Oral)   Resp 18   Ht 5' 6" (1.676 m)   Wt 130.3 kg (287 lb 2.4 oz)   SpO2 95%   BMI 46.35 kg/m²     GEN - A+OX4, NAD   HEENT - PERRL, EOMI, OP clear  Neck - No thyromegaly or cervical LAD. No thyroid masses felt.  CV - RRR, no m/r   Chest - CTAB, no wheezing or rhonchi  Abd - S/NT/ND/+BS.   Ext - 2+BDP and radial " pulses. No C/C/E.  Skin - No rash.        ASSESSMENT/PLAN     Marian Dumont is a 63 y.o. female with  1. Morbid obesity  Tried prescribing ozwempic. Was not able to start it due to cost of $600  Will trial for mounjaro  Overview:  Long discussion re weight loss      2. Type 2 diabetes mellitus without complication, without long-term current use of insulin  Markedly improved with diet continue with weight loss     3. Primary hypertension  Overview:  BP well controlled today  Continue with current regimen       4. ESRD on hemodialysis  Doing well     Other orders  -     SAXagliptin (ONGLYZA) 2.5 mg Tab tablet; Take 1 tablet (2.5 mg total) by mouth once daily.  Dispense: 90 tablet; Refill: 3  -     tirzepatide (MOUNJARO) 5 mg/0.5 mL PnIj; Inject 5 mg into the skin every 7 days.  Dispense: 4 pen ; Refill: 3           WORRY SCORE 2  RTC in 6 months, sooner if needed and depending on labs.    Mohini Majano MD  Board Certified Internist/Geriatrician  [facility] ([place])

## 2023-09-15 ENCOUNTER — PATIENT MESSAGE (OUTPATIENT)
Dept: PRIMARY CARE CLINIC | Facility: CLINIC | Age: 63
End: 2023-09-15
Payer: MEDICARE

## 2023-09-19 ENCOUNTER — PATIENT MESSAGE (OUTPATIENT)
Dept: PRIMARY CARE CLINIC | Facility: CLINIC | Age: 63
End: 2023-09-19
Payer: MEDICARE

## 2023-09-19 LAB
LEFT EYE DM RETINOPATHY: NEGATIVE
RIGHT EYE DM RETINOPATHY: NEGATIVE

## 2023-10-12 ENCOUNTER — HOSPITAL ENCOUNTER (OUTPATIENT)
Facility: HOSPITAL | Age: 63
Discharge: HOME | End: 2023-10-12
Attending: INTERNAL MEDICINE | Admitting: INTERNAL MEDICINE
Payer: MEDICARE

## 2023-10-12 ENCOUNTER — TELEPHONE (OUTPATIENT)
Dept: PRIMARY CARE CLINIC | Facility: CLINIC | Age: 63
End: 2023-10-12
Payer: MEDICARE

## 2023-10-12 ENCOUNTER — ANESTHESIA (OUTPATIENT)
Dept: ENDOSCOPY | Facility: HOSPITAL | Age: 63
End: 2023-10-12
Payer: MEDICARE

## 2023-10-12 ENCOUNTER — ANESTHESIA EVENT (OUTPATIENT)
Dept: ENDOSCOPY | Facility: HOSPITAL | Age: 63
End: 2023-10-12
Payer: MEDICARE

## 2023-10-12 DIAGNOSIS — Z12.11 SCREEN FOR COLON CANCER: ICD-10-CM

## 2023-10-12 PROCEDURE — 45380 PR COLONOSCOPY,BIOPSY: ICD-10-PCS | Mod: PT,,, | Performed by: INTERNAL MEDICINE

## 2023-10-12 PROCEDURE — 37000009 HC ANESTHESIA EA ADD 15 MINS: Performed by: INTERNAL MEDICINE

## 2023-10-12 PROCEDURE — D9220A PRA ANESTHESIA: Mod: PT,ANES,, | Performed by: ANESTHESIOLOGY

## 2023-10-12 PROCEDURE — 88305 TISSUE EXAM BY PATHOLOGIST: CPT | Mod: TC | Performed by: PATHOLOGY

## 2023-10-12 PROCEDURE — 37000008 HC ANESTHESIA 1ST 15 MINUTES: Performed by: INTERNAL MEDICINE

## 2023-10-12 PROCEDURE — D9220A PRA ANESTHESIA: ICD-10-PCS | Mod: PT,CRNA,, | Performed by: NURSE ANESTHETIST, CERTIFIED REGISTERED

## 2023-10-12 PROCEDURE — D9220A PRA ANESTHESIA: Mod: PT,CRNA,, | Performed by: NURSE ANESTHETIST, CERTIFIED REGISTERED

## 2023-10-12 PROCEDURE — 63600175 PHARM REV CODE 636 W HCPCS: Performed by: NURSE ANESTHETIST, CERTIFIED REGISTERED

## 2023-10-12 PROCEDURE — 25000003 PHARM REV CODE 250: Performed by: NURSE ANESTHETIST, CERTIFIED REGISTERED

## 2023-10-12 PROCEDURE — 45380 COLONOSCOPY AND BIOPSY: CPT | Mod: PT | Performed by: INTERNAL MEDICINE

## 2023-10-12 PROCEDURE — 27201012 HC FORCEPS, HOT/COLD, DISP: Performed by: INTERNAL MEDICINE

## 2023-10-12 PROCEDURE — 25000003 PHARM REV CODE 250: Performed by: INTERNAL MEDICINE

## 2023-10-12 PROCEDURE — D9220A PRA ANESTHESIA: ICD-10-PCS | Mod: PT,ANES,, | Performed by: ANESTHESIOLOGY

## 2023-10-12 PROCEDURE — 45380 COLONOSCOPY AND BIOPSY: CPT | Mod: PT,,, | Performed by: INTERNAL MEDICINE

## 2023-10-12 RX ORDER — SODIUM CHLORIDE 9 MG/ML
INJECTION, SOLUTION INTRAVENOUS CONTINUOUS
Status: DISCONTINUED | OUTPATIENT
Start: 2023-10-12 | End: 2023-10-12 | Stop reason: HOSPADM

## 2023-10-12 RX ORDER — PROPOFOL 10 MG/ML
VIAL (ML) INTRAVENOUS
Status: DISCONTINUED | OUTPATIENT
Start: 2023-10-12 | End: 2023-10-12

## 2023-10-12 RX ORDER — LIDOCAINE HYDROCHLORIDE 20 MG/ML
INJECTION INTRAVENOUS
Status: DISCONTINUED | OUTPATIENT
Start: 2023-10-12 | End: 2023-10-12

## 2023-10-12 RX ADMIN — SODIUM CHLORIDE: 0.9 INJECTION, SOLUTION INTRAVENOUS at 09:10

## 2023-10-12 RX ADMIN — PROPOFOL 50 MG: 10 INJECTION, EMULSION INTRAVENOUS at 11:10

## 2023-10-12 RX ADMIN — PROPOFOL 100 MG: 10 INJECTION, EMULSION INTRAVENOUS at 11:10

## 2023-10-12 RX ADMIN — LIDOCAINE HYDROCHLORIDE 100 MG: 20 INJECTION, SOLUTION INTRAVENOUS at 11:10

## 2023-10-12 NOTE — H&P
[facility] Note    CC: Screening colonoscopy    HPI 63 y.o. female presents for routine screening colonoscopy    Past Medical History:   Diagnosis Date    Anemia     Coronary artery disease     COVID-19     Diabetes mellitus     Diabetes mellitus, type 2     Dialysis patient     Disorder of kidney and ureter     GERD (gastroesophageal reflux disease)     Hypertension     Mixed hyperlipidemia     Obese body habitus        Allergies and Medications reviewed     Review of Systems  General ROS: negative for - chills, fever or weight loss  Cardiovascular ROS: no chest pain or dyspnea on exertion  Gastrointestinal ROS: no blood in stoool    Physical Examination  There were no vitals taken for this visit.  General appearance: alert, cooperative, no distress  HENT: Normocephalic, atraumatic, neck symmetrical, no nasal discharge, sclera anicteric   Lungs: clear to auscultation bilaterally, symmetric chest wall expansion bilaterally  Heart: regular rate and rhythm without rub; no displacement of the PMI   Abdomen: soft  Extremities: extremities symmetric; no clubbing, cyanosis, or edema        Labs:  Lab Results   Component Value Date    WBC 8.74 03/11/2023    HGB 10.4 (L) 03/11/2023    HCT 33.0 (L) 03/11/2023    MCV 95 03/11/2023     03/11/2023             Assessment:   63 y.o. female presents for colonoscopy    Plan:  -proceed to colonoscopy     Keren Villalobos MD  [facility]  [address], [address]  [place] [address]  Office: [phone]  Fax: [phone]

## 2023-10-12 NOTE — ANESTHESIA POSTPROCEDURE EVALUATION
Anesthesia Post Evaluation    Patient: Marian Dumont    Procedure(s) Performed: Procedure(s) (LRB):  COLONOSCOPY (N/A)    Final Anesthesia Type: general      Patient location during evaluation: PACU  Patient participation: Yes- Able to Participate  Level of consciousness: awake and alert  Post-procedure vital signs: reviewed and stable  Pain management: adequate  Airway patency: patent    PONV status at discharge: No PONV  Anesthetic complications: no      Cardiovascular status: hemodynamically stable  Respiratory status: unassisted and room air  Hydration status: euvolemic  Follow-up not needed.          Vitals Value Taken Time   /64 10/12/23 1149   Temp 36.1 °C (97 °F) 10/12/23 1149   Pulse 83 10/12/23 1149   Resp 16 10/12/23 1149   SpO2 94 % 10/12/23 1149         Event Time   Out of Recovery 12:01:54         Pain/An Amaría Score: Ana María Score: 10 (10/12/2023 11:45 AM)

## 2023-10-12 NOTE — OR NURSING
Pt notified  at 1245 today that her glasses were found in the recovery area.  Pt in agreement to pick them up downstairs in out patient registration.

## 2023-10-12 NOTE — TRANSFER OF CARE
"Anesthesia Transfer of Care Note    Patient: Marian Dumont    Procedure(s) Performed: Procedure(s) (LRB):  COLONOSCOPY (N/A)    Patient location: PACU    Anesthesia Type: general    Transport from OR: Transported from OR on 2-3 L/min O2 by NC with adequate spontaneous ventilation    Post pain: adequate analgesia    Post assessment: no apparent anesthetic complications and tolerated procedure well    Post vital signs: stable    Level of consciousness: responds to stimulation and sedated    Nausea/Vomiting: no nausea/vomiting    Complications: none    Transfer of care protocol was followed      Last vitals:   Visit Vitals  BP (!) 187/81 (BP Location: Right arm, Patient Position: Lying)   Pulse 84   Temp 36 °C (96.8 °F) (Skin)   Resp 15   Ht 5' 6" (1.676 m)   Wt (!) 138 kg (304 lb 3.8 oz)   SpO2 97%   Breastfeeding No   BMI 49.10 kg/m²     "

## 2023-10-12 NOTE — TELEPHONE ENCOUNTER
----- Message from Mohini Majano MD sent at 10/12/2023  1:11 PM CDT -----  Please tell patient I said thanks for completing colonoscopy. One polyp found pening pathology report repeat in 5-10 years. Resume regular diet etc

## 2023-10-12 NOTE — PLAN OF CARE
Vss, ramon po fluids, denies pain, ambulates easily. IV removed, catheter intact. Discharge instructions provided and states understanding. States ready to go home.  Discharged from facility with family per wheelchair.

## 2023-10-12 NOTE — [employer] PATIENT INSTRUCTIONS
Discharge Summary/Instructions after an Endoscopic Procedure  Patient Name: Marian Dumont  Patient MRN: [id number]  Patient YOB: 1960 Thursday, October 12, 2023  Keren Villalobos MD  Dear patient,  As a result of recent federal legislation (The Federal Cures Act), you may   receive lab or pathology results from your procedure in your [facility]   account before your physician is able to contact you. Your physician or   their representative will relay the results to you with their   recommendations at their soonest availability.  Thank you,  RESTRICTIONS:  During your procedure today, you received medications for sedation.  These   medications may affect your judgment, balance and coordination.  Therefore,   for 24 hours, you have the following restrictions:   - DO NOT drive a car, operate machinery, make legal/financial decisions,   sign important papers or drink alcohol.    ACTIVITY:  Today: no heavy lifting, straining or running due to procedural   sedation/anesthesia.  The following day: return to full activity including work.  DIET:  Eat and drink normally unless instructed otherwise.     TREATMENT FOR COMMON SIDE EFFECTS:  - Mild abdominal pain, nausea, belching, bloating or excessive gas:  rest,   eat lightly and use a heating pad.  - Sore Throat: treat with throat lozenges and/or gargle with warm salt   water.  - Because air was used during the procedure, expelling large amounts of air   from your rectum or belching is normal.  - If a bowel prep was taken, you may not have a bowel movement for 1-3 days.    This is normal.  SYMPTOMS TO WATCH FOR AND REPORT TO YOUR PHYSICIAN:  1. Abdominal pain or bloating, other than gas cramps.  2. Chest pain.  3. Back pain.  4. Signs of infection such as: chills or fever occurring within 24 hours   after the procedure.  5. Rectal bleeding, which would show as bright red, maroon, or black stools.   (A tablespoon of blood from the rectum is not  serious, especially if   hemorrhoids are present.)  6. Vomiting.  7. Weakness or dizziness.  GO DIRECTLY TO THE NEAREST EMERGENCY ROOM IF YOU HAVE ANY OF THE FOLLOWING:      Difficulty breathing              Chills and/or fever over 101 F   Persistent vomiting and/or vomiting blood   Severe abdominal pain   Severe chest pain   Black, tarry stools   Bleeding- more than one tablespoon   Any other symptom or condition that you feel may need urgent attention  Your doctor recommends these additional instructions:  If any biopsies were taken, your doctors clinic will contact you in 1 to 2   weeks with any results.  - Discharge patient to home (with escort).   - Patient has a contact number available for emergencies.  The signs and   symptoms of potential delayed complications were discussed with the   patient.  Return to normal activities tomorrow.  Written discharge   instructions were provided to the patient.   - Resume previous diet.   - Continue present medications.   - Await pathology results.   - Repeat colonoscopy in 5-10 years for surveillance based on pathology   results.   - Return to my office PRN.  For questions, problems or results please call your physician - Keren Villalobos MD at Work:  [phone].  [facility], EMERGENCY ROOM PHONE NUMBER: [phone]  IF A COMPLICATION OR EMERGENCY SITUATION ARISES AND YOU ARE UNABLE TO REACH   YOUR PHYSICIAN - GO DIRECTLY TO THE EMERGENCY ROOM.  Keren Villalobos MD  10/12/2023 11:24:29 AM  This report has been verified and signed electronically.  Dear patient,  As a result of recent federal legislation (The Federal Cures Act), you may   receive lab or pathology results from your procedure in your [facility]   account before your physician is able to contact you. Your physician or   their representative will relay the results to you with their   recommendations at their soonest availability.  Thank you,  [employer]ATION

## 2023-10-12 NOTE — PROGRESS NOTES
Please tell patient I said thanks for completing colonoscopy. One polyp found pening pathology report repeat in 5-10 years. Resume regular diet etc

## 2023-10-12 NOTE — ANESTHESIA PREPROCEDURE EVALUATION
10/12/2023  Marian Dumont is a 63 y.o., female.      Pre-op Assessment    I have reviewed the Patient Summary Reports.     I have reviewed the Nursing Notes. I have reviewed the NPO Status.   I have reviewed the Medications.     Review of Systems  Anesthesia Hx:  No problems with previous Anesthesia    Social:  Non-Smoker    Hematology/Oncology:         -- Anemia:   Cardiovascular:   Hypertension CAD   hyperlipidemia    Pulmonary:   Shortness of breath Sleep Apnea    Renal/:   Chronic Renal Disease, ESRD    Hepatic/GI:   GERD Liver Disease,    Musculoskeletal:  Musculoskeletal Normal    Neurological:  Neurology Normal    Endocrine:   Diabetes    Dermatological:  Skin Normal    Psych:  Psychiatric Normal           Physical Exam  General: Well nourished, Cooperative, Alert and Oriented    Airway:  Mallampati: II   Mouth Opening: Normal  TM Distance: Normal      Dental:  Dentures    Chest/Lungs:  Normal Respiratory Rate    Heart:  Rate: Normal        Anesthesia Plan  Type of Anesthesia, risks & benefits discussed:    Anesthesia Type: Gen Natural Airway  Intra-op Monitoring Plan: Standard ASA Monitors  Induction:  IV  Informed Consent: Informed consent signed with the Patient and all parties understand the risks and agree with anesthesia plan.  All questions answered.   ASA Score: 3    Ready For Surgery From Anesthesia Perspective.     .

## 2023-10-13 VITALS
DIASTOLIC BLOOD PRESSURE: 64 MMHG | TEMPERATURE: 97 F | BODY MASS INDEX: 47.09 KG/M2 | HEIGHT: 66 IN | OXYGEN SATURATION: 94 % | WEIGHT: 293 LBS | SYSTOLIC BLOOD PRESSURE: 134 MMHG | HEART RATE: 83 BPM | RESPIRATION RATE: 16 BRPM

## 2023-10-16 ENCOUNTER — PATIENT MESSAGE (OUTPATIENT)
Dept: PRIMARY CARE CLINIC | Facility: CLINIC | Age: 63
End: 2023-10-16
Payer: MEDICARE

## 2023-10-26 ENCOUNTER — TELEPHONE (OUTPATIENT)
Dept: PRIMARY CARE CLINIC | Facility: CLINIC | Age: 63
End: 2023-10-26

## 2023-10-26 NOTE — TELEPHONE ENCOUNTER
----- Message from Reji Hughes sent at 10/26/2023  2:19 PM CDT -----  Contact: daughter [phone]  Patient states [facility] has not delivered walker for patient. Patient also needs new orders for a C-PAP mask. Patient states mask is broken. Please call More Lopes daughter [phone] and advise.    Thank you and have a great day.

## 2023-10-26 NOTE — TELEPHONE ENCOUNTER
I spoke with patient's daughter, informing her that [facility] went out twice on 08/26 and 09/07 and patient refused it both times saying it is the wrong product. They brought out a Standard rolling walker and because of the pt's weight they tried to give her a heavy duty but she refused it. So I called her and explained to her that she has to get the heavy duty because the standard rolling walker will not work for her.  So she said she would take it yet we got an updated order for a heavy duty walker and went back out there on 09/07. Pt refused it.   Patient to reach out to her pulmonologist about her CPAP mask. Patient's daughter verbalized understanding.

## 2023-11-24 ENCOUNTER — PATIENT MESSAGE (OUTPATIENT)
Dept: PRIMARY CARE CLINIC | Facility: CLINIC | Age: 63
End: 2023-11-24
Payer: MEDICARE

## 2023-12-05 ENCOUNTER — TELEPHONE (OUTPATIENT)
Dept: PRIMARY CARE CLINIC | Facility: CLINIC | Age: 63
End: 2023-12-05
Payer: MEDICARE

## 2023-12-05 ENCOUNTER — OFFICE VISIT (OUTPATIENT)
Dept: PRIMARY CARE CLINIC | Facility: CLINIC | Age: 63
End: 2023-12-05
Payer: MEDICARE

## 2023-12-05 VITALS
HEART RATE: 72 BPM | SYSTOLIC BLOOD PRESSURE: 130 MMHG | OXYGEN SATURATION: 98 % | WEIGHT: 280.19 LBS | RESPIRATION RATE: 18 BRPM | BODY MASS INDEX: 45.03 KG/M2 | DIASTOLIC BLOOD PRESSURE: 60 MMHG | HEIGHT: 66 IN

## 2023-12-05 DIAGNOSIS — E66.01 MORBID OBESITY WITH BODY MASS INDEX (BMI) OF 40.0 TO 49.9: ICD-10-CM

## 2023-12-05 DIAGNOSIS — N18.6 ESRD (END STAGE RENAL DISEASE): Primary | ICD-10-CM

## 2023-12-05 DIAGNOSIS — I10 PRIMARY HYPERTENSION: ICD-10-CM

## 2023-12-05 DIAGNOSIS — E11.9 TYPE 2 DIABETES MELLITUS WITHOUT COMPLICATION, WITHOUT LONG-TERM CURRENT USE OF INSULIN: ICD-10-CM

## 2023-12-05 PROCEDURE — 99213 OFFICE O/P EST LOW 20 MIN: CPT | Mod: PBBFAC,PN | Performed by: INTERNAL MEDICINE

## 2023-12-05 PROCEDURE — 99214 PR OFFICE/OUTPT VISIT, EST, LEVL IV, 30-39 MIN: ICD-10-PCS | Mod: S$PBB,,, | Performed by: INTERNAL MEDICINE

## 2023-12-05 PROCEDURE — 99999 PR PBB SHADOW E&M-EST. PATIENT-LVL III: ICD-10-PCS | Mod: PBBFAC,,, | Performed by: INTERNAL MEDICINE

## 2023-12-05 PROCEDURE — 99214 OFFICE O/P EST MOD 30 MIN: CPT | Mod: S$PBB,,, | Performed by: INTERNAL MEDICINE

## 2023-12-05 PROCEDURE — 99999 PR PBB SHADOW E&M-EST. PATIENT-LVL III: CPT | Mod: PBBFAC,,, | Performed by: INTERNAL MEDICINE

## 2023-12-05 RX ORDER — TIRZEPATIDE 5 MG/.5ML
5 INJECTION, SOLUTION SUBCUTANEOUS
Qty: 4 PEN | Refills: 3 | Status: SHIPPED | OUTPATIENT
Start: 2023-12-05

## 2023-12-05 NOTE — PROGRESS NOTES
INTERNAL MEDICINE PROGRESS/URGENT CARE NOTE    CHIEF COMPLAINT     Chief Complaint   Patient presents with    Follow-up     Discuss overall health        HPI   Marian Dumont is a 63 y.o.  female who presents with a PMHx of  EDRD on HD, DM2, HLD, HTN, anemia, GERD, Morbid obesity, who presents today for  an ollow up visit today.    Here for weight check   Trying to work on weight  loss so she can be referred for kidney transplant.   Went to one session of the therapy and canceled her next appointment  No notable weight loss on her chart here but says she has lost some weight on her scale at home  Having trouble with her diet because everything weve told her to eat she says is affecting her lytes at dialysis. Told her to eat yogurt and blueberries, she does and the nutritionist told her it was raising her calcium too high  Previous weight was 308, today 288. Shes lostalmost 20 pounds in 3 months !!!     [facility] and to keep the weight off . Today she reports these have not made the follow up appointment as she has been ill.   Also has not followed up with nutritionist due to being ill.   Today we discussed that I'm very proud of her efforst ie has completed her colonscnopy, has lost almost 25 pounds etc but we need to be more fastidious about counselling and nutritionist to help with there wieght loss. She's lost 20 pounds but has plateaued.      Hgb a1c has improved frmo 7.4 down to 5.8!!! Wonderful.   She is now completed her colonoscopy and she has her eye eye exam completed.     Home Medications:  Prior to Admission medications    Medication Sig Start Date End Date Taking? Authorizing Provider   amLODIPine (NORVASC) 5 MG tablet Take 5 mg by mouth once daily.    Provider, Historical   aspirin (ECOTRIN) 81 MG EC tablet Take 81 mg by mouth once daily.    Provider, Historical   calcitRIOL (ROCALTROL) 0.25 MCG Cap Take 0.25 mcg by mouth once daily.    Provider, Historical    carvediloL (COREG) 12.5 MG tablet Take 12.5 mg by mouth 2 (two) times daily with meals.    Provider, Historical   cinacalcet (SENSIPAR) 30 MG Tab Take 30 mg by mouth Daily. 1/18/23   Provider, Historical   LIDOcaine-prilocaine (EMLA) cream Apply 1 each topically as needed. 1/26/23   Provider, Historical   omeprazole (PRILOSEC) 40 MG capsule Take 1 capsule (40 mg total) by mouth once daily. 8/10/23   Eugenie Paz, NP   ondansetron (ZOFRAN) 4 MG tablet Take 1 tablet (4 mg total) by mouth every 6 (six) hours as needed for Nausea. 3/12/23   Sean العلي MD   rosuvastatin (CRESTOR) 10 MG tablet Take 1 tablet (10 mg total) by mouth once daily. 7/18/23   Mohini Majano MD   SAXagliptin (ONGLYZA) 2.5 mg Tab tablet Take 1 tablet (2.5 mg total) by mouth once daily. 9/5/23 9/4/24  Mohini Majano MD   tirzepatide (MOUNJARO) 5 mg/0.5 mL PnIj Inject 5 mg into the skin every 7 days.  Patient not taking: Reported on 10/12/2023 9/5/23   Mohini Majano MD   walker Misc 1 each by Misc.(Non-Drug; Combo Route) route once daily.  Patient not taking: Reported on 8/31/2023 8/28/23   Mohini Majano MD       Review of Systems:      Date: 12/05/2023    Power of   I initiated the process of voluntary advance care planning today and explained the importance of this process to the patient.  I introduced the concept of advance directives to the patient, as well. Then the patient received detailed information about the importance of designating a Health Care Power of  (HCPOA). She was also instructed to communicate with this person about their wishes for future healthcare, should she become sick and lose decision-making capacity. The patient has not previously appointed a HCPOA. After our discussion, the patient has not decided to complete a HCPOA and has appointed her daughter, health care agent:  on file  & health care agent number:  on file  I spent a total time of 10 minutes discussing this  "issue with the patient.                PHYSICAL EXAM     /60 (BP Location: Right forearm, Patient Position: Sitting, BP Method: Medium (Manual))   Pulse 72   Resp 18   Ht 5' 6" (1.676 m)   Wt 127.1 kg (280 lb 3.3 oz)   SpO2 98%   BMI 45.23 kg/m²     GEN - A+OX4, NAD   HEENT - PERRL, EOMI, OP clear  Neck - No thyromegaly or cervical LAD. No thyroid masses felt.  CV - RRR, no m/r   Chest - CTAB, no wheezing or rhonchi  Abd - S/NT/ND/+BS.   Ext - 2+BDP and radial pulses. No C/C/E.  Skin - No rash.    LABS         ASSESSMENT/PLAN     Marian Dumont is a 63 y.o. female with  1. ESRD (end stage renal disease)  Overview:  ON HD.   Referral to cardiology. High risk for CVD.   Follows with nephrology  Now having itching       2. Morbid obesity with body mass index (BMI) of 40.0 to 49.9  -     CBC Auto Differential; Future; Expected date: 12/05/2023  -     TSH; Future; Expected date: 12/05/2023    3. Type 2 diabetes mellitus without complication, without long-term current use of insulin  Much improved control  -     Lipid Panel; Future; Expected date: 12/05/2023  -     Microalbumin/Creatinine Ratio, Urine    4. Primary hypertension  Overview:  BP well controlled today  Continue with current regimen     Orders:  -     Comprehensive Metabolic Panel; Future; Expected date: 12/05/2023  -     TSH; Future; Expected date: 12/05/2023    Other orders  -     tirzepatide (MOUNJARO) 5 mg/0.5 mL PnIj; Inject 5 mg into the skin every 7 days.  Dispense: 4 pen ; Refill: 3           WORRY SCORE 3  RTC in 1 months, sooner if needed and depending on labs.    Mohini Majano MD  Board Certified Internist/Geriatrician  [facility]      "

## 2023-12-07 ENCOUNTER — LAB VISIT (OUTPATIENT)
Dept: LAB | Facility: HOSPITAL | Age: 63
End: 2023-12-07
Attending: INTERNAL MEDICINE
Payer: MEDICARE

## 2023-12-07 DIAGNOSIS — I10 PRIMARY HYPERTENSION: ICD-10-CM

## 2023-12-07 DIAGNOSIS — E11.9 TYPE 2 DIABETES MELLITUS WITHOUT COMPLICATION, WITHOUT LONG-TERM CURRENT USE OF INSULIN: ICD-10-CM

## 2023-12-07 DIAGNOSIS — E66.01 MORBID OBESITY WITH BODY MASS INDEX (BMI) OF 40.0 TO 49.9: ICD-10-CM

## 2023-12-07 LAB
ALBUMIN SERPL BCP-MCNC: 3 G/DL (ref 3.5–5.2)
ALP SERPL-CCNC: 85 U/L (ref 55–135)
ALT SERPL W/O P-5'-P-CCNC: 8 U/L (ref 10–44)
ANION GAP SERPL CALC-SCNC: 17 MMOL/L (ref 8–16)
AST SERPL-CCNC: 12 U/L (ref 10–40)
BASOPHILS # BLD AUTO: 0.03 K/UL (ref 0–0.2)
BASOPHILS NFR BLD: 0.4 % (ref 0–1.9)
BILIRUB SERPL-MCNC: 0.5 MG/DL (ref 0.1–1)
BUN SERPL-MCNC: 39 MG/DL (ref 8–23)
CALCIUM SERPL-MCNC: 9.5 MG/DL (ref 8.7–10.5)
CHLORIDE SERPL-SCNC: 101 MMOL/L (ref 95–110)
CHOLEST SERPL-MCNC: 121 MG/DL (ref 120–199)
CHOLEST/HDLC SERPL: 3.5 {RATIO} (ref 2–5)
CO2 SERPL-SCNC: 26 MMOL/L (ref 23–29)
CREAT SERPL-MCNC: 9.2 MG/DL (ref 0.5–1.4)
DIFFERENTIAL METHOD: ABNORMAL
EOSINOPHIL # BLD AUTO: 0.1 K/UL (ref 0–0.5)
EOSINOPHIL NFR BLD: 2 % (ref 0–8)
ERYTHROCYTE [DISTWIDTH] IN BLOOD BY AUTOMATED COUNT: 17 % (ref 11.5–14.5)
EST. GFR  (NO RACE VARIABLE): 4.4 ML/MIN/1.73 M^2
GLUCOSE SERPL-MCNC: 106 MG/DL (ref 70–110)
HCT VFR BLD AUTO: 34.6 % (ref 37–48.5)
HDLC SERPL-MCNC: 35 MG/DL (ref 40–75)
HDLC SERPL: 28.9 % (ref 20–50)
HGB BLD-MCNC: 11.5 G/DL (ref 12–16)
IMM GRANULOCYTES # BLD AUTO: 0.01 K/UL (ref 0–0.04)
IMM GRANULOCYTES NFR BLD AUTO: 0.1 % (ref 0–0.5)
LDLC SERPL CALC-MCNC: 67.8 MG/DL (ref 63–159)
LYMPHOCYTES # BLD AUTO: 1.4 K/UL (ref 1–4.8)
LYMPHOCYTES NFR BLD: 19.6 % (ref 18–48)
MCH RBC QN AUTO: 27.8 PG (ref 27–31)
MCHC RBC AUTO-ENTMCNC: 33.2 G/DL (ref 32–36)
MCV RBC AUTO: 84 FL (ref 82–98)
MONOCYTES # BLD AUTO: 0.6 K/UL (ref 0.3–1)
MONOCYTES NFR BLD: 8.1 % (ref 4–15)
NEUTROPHILS # BLD AUTO: 4.9 K/UL (ref 1.8–7.7)
NEUTROPHILS NFR BLD: 69.8 % (ref 38–73)
NONHDLC SERPL-MCNC: 86 MG/DL
NRBC BLD-RTO: 0 /100 WBC
PLATELET # BLD AUTO: 258 K/UL (ref 150–450)
PMV BLD AUTO: 10.8 FL (ref 9.2–12.9)
POTASSIUM SERPL-SCNC: 4.1 MMOL/L (ref 3.5–5.1)
PROT SERPL-MCNC: 7.3 G/DL (ref 6–8.4)
RBC # BLD AUTO: 4.13 M/UL (ref 4–5.4)
SODIUM SERPL-SCNC: 144 MMOL/L (ref 136–145)
TRIGL SERPL-MCNC: 91 MG/DL (ref 30–150)
TSH SERPL DL<=0.005 MIU/L-ACNC: 2.53 UIU/ML (ref 0.4–4)
WBC # BLD AUTO: 6.95 K/UL (ref 3.9–12.7)

## 2023-12-07 PROCEDURE — 80061 LIPID PANEL: CPT | Performed by: INTERNAL MEDICINE

## 2023-12-07 PROCEDURE — 85025 COMPLETE CBC W/AUTO DIFF WBC: CPT | Performed by: INTERNAL MEDICINE

## 2023-12-07 PROCEDURE — 80053 COMPREHEN METABOLIC PANEL: CPT | Performed by: INTERNAL MEDICINE

## 2023-12-07 PROCEDURE — 36415 COLL VENOUS BLD VENIPUNCTURE: CPT | Mod: PO | Performed by: INTERNAL MEDICINE

## 2023-12-07 PROCEDURE — 84443 ASSAY THYROID STIM HORMONE: CPT | Performed by: INTERNAL MEDICINE

## 2023-12-08 ENCOUNTER — PATIENT OUTREACH (OUTPATIENT)
Dept: PRIMARY CARE CLINIC | Facility: CLINIC | Age: 63
End: 2023-12-08
Payer: MEDICARE

## 2023-12-08 ENCOUNTER — TELEPHONE (OUTPATIENT)
Dept: PRIMARY CARE CLINIC | Facility: CLINIC | Age: 63
End: 2023-12-08
Payer: MEDICARE

## 2023-12-08 RX ORDER — MONTELUKAST SODIUM 10 MG/1
10 TABLET ORAL NIGHTLY
Status: ON HOLD | COMMUNITY
Start: 2023-09-26 | End: 2024-02-20

## 2023-12-08 NOTE — TELEPHONE ENCOUNTER
----- Message from Mohini Majano MD sent at 12/8/2023  8:14 AM CST -----  Labs show :  Chronic anemia which has improved. Otherwise, normal blood counts.   Cholesterol is wnl.   Thyroid is normal.

## 2023-12-08 NOTE — PROGRESS NOTES
Labs show :  Chronic anemia which has improved. Otherwise, normal blood counts.   Cholesterol is wnl.   Thyroid is normal.

## 2023-12-21 ENCOUNTER — PATIENT OUTREACH (OUTPATIENT)
Dept: PRIMARY CARE CLINIC | Facility: CLINIC | Age: 63
End: 2023-12-21
Payer: MEDICARE

## 2024-02-20 ENCOUNTER — HOSPITAL ENCOUNTER (OUTPATIENT)
Facility: HOSPITAL | Age: 64
Discharge: HOME | End: 2024-02-20
Attending: STUDENT IN AN ORGANIZED HEALTH CARE EDUCATION/TRAINING PROGRAM | Admitting: STUDENT IN AN ORGANIZED HEALTH CARE EDUCATION/TRAINING PROGRAM
Payer: MEDICARE

## 2024-02-20 VITALS
HEIGHT: 66 IN | TEMPERATURE: 99 F | OXYGEN SATURATION: 98 % | DIASTOLIC BLOOD PRESSURE: 56 MMHG | SYSTOLIC BLOOD PRESSURE: 150 MMHG | WEIGHT: 280 LBS | RESPIRATION RATE: 16 BRPM | HEART RATE: 90 BPM | BODY MASS INDEX: 45 KG/M2

## 2024-02-20 DIAGNOSIS — N18.6 END STAGE RENAL DISEASE: ICD-10-CM

## 2024-02-20 DIAGNOSIS — Z01.810 PREOP CARDIOVASCULAR EXAM: ICD-10-CM

## 2024-02-20 LAB
ANION GAP SERPL CALC-SCNC: 11 MMOL/L (ref 8–16)
BUN SERPL-MCNC: 65 MG/DL (ref 8–23)
CALCIUM SERPL-MCNC: 10.2 MG/DL (ref 8.7–10.5)
CHLORIDE SERPL-SCNC: 100 MMOL/L (ref 95–110)
CO2 SERPL-SCNC: 30 MMOL/L (ref 23–29)
CREAT SERPL-MCNC: 9.7 MG/DL (ref 0.5–1.4)
EST. GFR  (NO RACE VARIABLE): 4.1 ML/MIN/1.73 M^2
GLUCOSE SERPL-MCNC: 95 MG/DL (ref 70–110)
POTASSIUM SERPL-SCNC: 4.6 MMOL/L (ref 3.5–5.1)
SODIUM SERPL-SCNC: 141 MMOL/L (ref 136–145)

## 2024-02-20 PROCEDURE — C1725 CATH, TRANSLUMIN NON-LASER: HCPCS | Performed by: STUDENT IN AN ORGANIZED HEALTH CARE EDUCATION/TRAINING PROGRAM

## 2024-02-20 PROCEDURE — 80048 BASIC METABOLIC PNL TOTAL CA: CPT | Performed by: STUDENT IN AN ORGANIZED HEALTH CARE EDUCATION/TRAINING PROGRAM

## 2024-02-20 PROCEDURE — 93005 ELECTROCARDIOGRAM TRACING: CPT | Performed by: INTERNAL MEDICINE

## 2024-02-20 PROCEDURE — C1874 STENT, COATED/COV W/DEL SYS: HCPCS | Performed by: STUDENT IN AN ORGANIZED HEALTH CARE EDUCATION/TRAINING PROGRAM

## 2024-02-20 PROCEDURE — C1887 CATHETER, GUIDING: HCPCS | Performed by: STUDENT IN AN ORGANIZED HEALTH CARE EDUCATION/TRAINING PROGRAM

## 2024-02-20 PROCEDURE — C1894 INTRO/SHEATH, NON-LASER: HCPCS | Performed by: STUDENT IN AN ORGANIZED HEALTH CARE EDUCATION/TRAINING PROGRAM

## 2024-02-20 PROCEDURE — C1769 GUIDE WIRE: HCPCS | Performed by: STUDENT IN AN ORGANIZED HEALTH CARE EDUCATION/TRAINING PROGRAM

## 2024-02-20 PROCEDURE — 99153 MOD SED SAME PHYS/QHP EA: CPT | Performed by: STUDENT IN AN ORGANIZED HEALTH CARE EDUCATION/TRAINING PROGRAM

## 2024-02-20 PROCEDURE — 27201423 OPTIME MED/SURG SUP & DEVICES STERILE SUPPLY: Performed by: STUDENT IN AN ORGANIZED HEALTH CARE EDUCATION/TRAINING PROGRAM

## 2024-02-20 PROCEDURE — 63600175 PHARM REV CODE 636 W HCPCS: Performed by: STUDENT IN AN ORGANIZED HEALTH CARE EDUCATION/TRAINING PROGRAM

## 2024-02-20 PROCEDURE — 25000003 PHARM REV CODE 250: Performed by: STUDENT IN AN ORGANIZED HEALTH CARE EDUCATION/TRAINING PROGRAM

## 2024-02-20 PROCEDURE — 99152 MOD SED SAME PHYS/QHP 5/>YRS: CPT | Performed by: STUDENT IN AN ORGANIZED HEALTH CARE EDUCATION/TRAINING PROGRAM

## 2024-02-20 PROCEDURE — 25500020 PHARM REV CODE 255: Performed by: STUDENT IN AN ORGANIZED HEALTH CARE EDUCATION/TRAINING PROGRAM

## 2024-02-20 PROCEDURE — 36903 INTRO CATH DIALYSIS CIRCUIT: CPT | Performed by: STUDENT IN AN ORGANIZED HEALTH CARE EDUCATION/TRAINING PROGRAM

## 2024-02-20 PROCEDURE — 93010 ELECTROCARDIOGRAM REPORT: CPT | Mod: ,,, | Performed by: INTERNAL MEDICINE

## 2024-02-20 DEVICE — VIABAHN SX ENDO HEPARIN 18 RO 8MMX5CM 7FR 120CM CATH
Type: IMPLANTABLE DEVICE | Site: ARM | Status: FUNCTIONAL
Brand: GORE VIABAHN ENDOPROSTHESIS WITH HEPARIN

## 2024-02-20 RX ORDER — HYDRALAZINE HYDROCHLORIDE 20 MG/ML
INJECTION INTRAMUSCULAR; INTRAVENOUS
Status: DISCONTINUED
Start: 2024-02-20 | End: 2024-02-20 | Stop reason: HOSPADM

## 2024-02-20 RX ORDER — HYDRALAZINE HYDROCHLORIDE 20 MG/ML
10 INJECTION INTRAMUSCULAR; INTRAVENOUS ONCE
Status: DISCONTINUED | OUTPATIENT
Start: 2024-02-20 | End: 2024-02-20 | Stop reason: HOSPADM

## 2024-02-20 RX ORDER — MIDAZOLAM HYDROCHLORIDE 1 MG/ML
INJECTION INTRAMUSCULAR; INTRAVENOUS
Status: DISCONTINUED | OUTPATIENT
Start: 2024-02-20 | End: 2024-02-20 | Stop reason: HOSPADM

## 2024-02-20 RX ORDER — LIDOCAINE HYDROCHLORIDE 10 MG/ML
INJECTION, SOLUTION EPIDURAL; INFILTRATION; INTRACAUDAL; PERINEURAL
Status: DISCONTINUED | OUTPATIENT
Start: 2024-02-20 | End: 2024-02-20 | Stop reason: HOSPADM

## 2024-02-20 RX ORDER — IODIXANOL 320 MG/ML
INJECTION, SOLUTION INTRAVASCULAR
Status: DISCONTINUED | OUTPATIENT
Start: 2024-02-20 | End: 2024-02-20 | Stop reason: HOSPADM

## 2024-02-20 RX ORDER — FENTANYL CITRATE 50 UG/ML
INJECTION, SOLUTION INTRAMUSCULAR; INTRAVENOUS
Status: DISCONTINUED | OUTPATIENT
Start: 2024-02-20 | End: 2024-02-20 | Stop reason: HOSPADM

## 2024-02-20 NOTE — DISCHARGE INSTRUCTIONS
Post Cath Discharge Instructions  Keep puncture site covered with current bandage.  Do not take a tub bath or submerge the puncture site in water for 72 hours.     No lifting over 5 pounds with the arm you puncture site is on for 72 hours.  No strenuous activity such as bowling, tennis, etc for 72 hours  Do not operate lawnmower, motorcycle, chainsaw, or all terrain vehicle for 72 hours.  No blood pressure or tourniquets on affected arm  The puncture site may be slightly bruised and sore following your procedure.   Drink 6-8 glasses of clear liquids during the next 24 hours to flush the dye out.     If Bleeding Occurs, DO NOT PANIC  If bleeding a large amount of bleeding or spurting, call 911! Do not drive yourself to the hospital.     Should any of the following occur, Contact your Physician Immediately:  Redness, inflamation, swelling, chills or fever, or discolred drainage at procedure site   Coldness, discoloration, ongoing numbness, severe pain, or swelling. Expect mild tenderness at the puncture site for up to 3 days. If this persists or other symptoms develop, notify your physician

## 2024-02-20 NOTE — OP NOTE
OPERATIVE NOTE    DATE: 2/20/24     SURGEON: Irish Castanon MD    PRE-OPERATIVE DIAGNOSIS:   Malfunctioning left upper extremity AV graft    POST-OPERATIVE DIAGNOSIS:   Same    PROCEDURE:   Fistulogram with stenting and angioplasty of left axillary vein    INDICATION: Patient is an 63F with left upper extremity AVG placed approximately 4 years ago in [place]. She has had multiple interventions in the past. Plan for fistulogram with possible angioplasty, possible stent placement.      TECHNIQUE:  Patient was taken to the cath lab and placed in the supine position on the procedure table. The left arm was prepped and draped in the usual sterile fashion. A time out was performed and sedation was given. Under my direct supervision, moderate sedation was administered with an initial dose of 25mcg Fentanyl and 1mg Versed.  These were readministered during the course of the case.  An independent observer was present throughout the procedure, there was continuous monitoring of cardiac telemetry, hemodynamics and pulse oximetry.  I was personally present and spent 60 minutes face to face time during sedation administration. Antegrade access to the LUE AVG was obtained with micropuncture technique. A 6Fr sheath was placed. Images of the fistula were performed showing patent anastomosis and inflow, a patent AVG and stenosis of the axillary vein. A stiff glide wire and catheter were used to cannulate the SVC. The catheter was withdrawn, and the sheath was upsized to a 7Fr sheath. A 9 x 40mm Conquest balloon was used in the stenotic area. There was residual stenosis. Decision was made to place a stent. Based on lab availability, an 8 x 50mm Viabahn stent was advanced to the stenotic area and deployed. Post deployment imaging was performed showing good stent placement and no residual stenosis. All wires and catheters were withdrawn. A 4-0 vicryl suture was placed around the sheath, and the sheath was removed. The suture was tied and  hemostatic. Pressure was held. The patient tolerated the procedure well, and she was transferred to the recovery room in stable condition.    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

## 2024-02-20 NOTE — PLAN OF CARE
Ambulated onto unit without difficulty. No complaints of pain or distress noted. Undressed of personal clothing and gown on. Pre-op. Resting in bed with call light in reach. Daughter at bedside.

## 2024-02-20 NOTE — PLAN OF CARE
Ambulated around room without difficulty. No complaints of pain or distress noted. VSS. Dressing to left fistula site remains CDI. Proceed with discharge as ordered.

## 2024-02-20 NOTE — PLAN OF CARE
Verbalizes understanding of discharge instructions, fistula site care, and follow up care. Belongings gathered and dressed in personal clothing. Left via wheelchair to private auto accompanied by family.

## 2024-02-20 NOTE — PLAN OF CARE
Patient was hypertensive, 180/75 and Dr Castanon ordered IV hydralazine. Attempted to admin and patient refused and requested to retake BP. BP now 166/69. Refuses hydralazine and states she will take PO BP medications upon arriving at home.

## 2024-02-20 NOTE — PLAN OF CARE
Bedside hand off report given to CRISTINO Valenzuela .  Chart given. Left to cath lab via stretcher for scheduled procedure.

## 2024-02-20 NOTE — DISCHARGE SUMMARY
[facility]  Discharge Note  Short Stay    Procedure(s) (LRB):  Fistulogram (Bilateral)      OUTCOME: Condition has improved and patient is now ready for discharge.    DISPOSITION: Home or Self Care    FINAL DIAGNOSIS:  Left axillary vein stenosis     FOLLOWUP: None    DISCHARGE INSTRUCTIONS:  No discharge procedures on file.     TIME SPENT ON DISCHARGE: 10 minutes

## 2024-02-20 NOTE — PLAN OF CARE
Report received from CRISTINO Valenzuela. Arrived back from cath lab via stretcher. No complaints of pain or distress noted. Dressing to left fistula site CDI. Post procedure instructions given. Resting in bed with call light in reach.

## 2024-02-29 LAB
OHS QRS DURATION: 70 MS
OHS QTC CALCULATION: 468 MS

## 2024-03-04 ENCOUNTER — PATIENT MESSAGE (OUTPATIENT)
Dept: PRIMARY CARE CLINIC | Facility: CLINIC | Age: 64
End: 2024-03-04
Payer: MEDICARE

## 2024-03-05 ENCOUNTER — TELEPHONE (OUTPATIENT)
Dept: PRIMARY CARE CLINIC | Facility: CLINIC | Age: 64
End: 2024-03-05
Payer: MEDICARE

## 2024-03-05 RX ORDER — AMLODIPINE BESYLATE 5 MG/1
5 TABLET ORAL DAILY
Qty: 90 TABLET | Refills: 1 | Status: SHIPPED | OUTPATIENT
Start: 2024-03-05

## 2024-03-05 RX ORDER — CARVEDILOL 12.5 MG/1
12.5 TABLET ORAL 2 TIMES DAILY WITH MEALS
Qty: 90 TABLET | Refills: 1 | Status: SHIPPED | OUTPATIENT
Start: 2024-03-05 | End: 2024-06-07

## 2024-03-19 ENCOUNTER — OFFICE VISIT (OUTPATIENT)
Dept: CARDIOLOGY | Facility: CLINIC | Age: 64
End: 2024-03-19
Payer: MEDICARE

## 2024-03-19 VITALS
DIASTOLIC BLOOD PRESSURE: 76 MMHG | BODY MASS INDEX: 45.16 KG/M2 | WEIGHT: 281 LBS | HEART RATE: 76 BPM | SYSTOLIC BLOOD PRESSURE: 132 MMHG | HEIGHT: 66 IN | OXYGEN SATURATION: 98 % | RESPIRATION RATE: 16 BRPM

## 2024-03-19 DIAGNOSIS — Z86.79 HISTORY OF SUBDURAL HEMATOMA: ICD-10-CM

## 2024-03-19 DIAGNOSIS — Z99.2 ESRD ON HEMODIALYSIS: ICD-10-CM

## 2024-03-19 DIAGNOSIS — N18.6 ESRD ON HEMODIALYSIS: ICD-10-CM

## 2024-03-19 DIAGNOSIS — R60.0 BILATERAL LOWER EXTREMITY EDEMA: ICD-10-CM

## 2024-03-19 DIAGNOSIS — I42.2 HYPERTROPHIC CARDIOMYOPATHY: Primary | ICD-10-CM

## 2024-03-19 DIAGNOSIS — E66.01 MORBID OBESITY WITH BODY MASS INDEX (BMI) OF 40.0 TO 49.9: ICD-10-CM

## 2024-03-19 DIAGNOSIS — I31.39 PERICARDIAL EFFUSION: ICD-10-CM

## 2024-03-19 DIAGNOSIS — H92.12 EAR DRAINAGE, LEFT: ICD-10-CM

## 2024-03-19 PROCEDURE — 99213 OFFICE O/P EST LOW 20 MIN: CPT | Mod: PBBFAC,PN | Performed by: INTERNAL MEDICINE

## 2024-03-19 PROCEDURE — 99215 OFFICE O/P EST HI 40 MIN: CPT | Mod: S$PBB,,, | Performed by: INTERNAL MEDICINE

## 2024-03-19 PROCEDURE — 99999 PR PBB SHADOW E&M-EST. PATIENT-LVL III: CPT | Mod: PBBFAC,,, | Performed by: INTERNAL MEDICINE

## 2024-03-19 NOTE — PROGRESS NOTES
"Subjective:    Patient ID:  Marian Dumont is a 64 y.o. female     HPI:  Ms Marian Dumont is a 64 y.o. female is here for follow-up.    Patient has ear drainage and she is being scheduled for ENT surgery.    Otherwise patient is doing well her breathing has been stable.  And denies any chest pain.  Denies any dizziness or lightheadedness.    After having the dialysis she has difficulty lying down flat she has a sit up for a few hours and then feels better.  She is taking all her medications regularly.    Review of patient's allergies indicates:   Allergen Reactions    Ace inhibitors Anaphylaxis and Swelling     Other reaction(s): Angioedema         Past Medical History:   Diagnosis Date    Anemia     Coronary artery disease     COVID-19     Diabetes mellitus     Diabetes mellitus, type 2     Dialysis patient     Disorder of kidney and ureter     GERD (gastroesophageal reflux disease)     Hypertension     Mixed hyperlipidemia     Obese body habitus     Sleep apnea      Past Surgical History:   Procedure Laterality Date    BRAIN SURGERY       SECTION  1998    CHOLECYSTECTOMY      COLONOSCOPY N/A 10/12/2023    Procedure: COLONOSCOPY;  Surgeon: Keren Villalobos MD;  Location: [facility] ENDO;  Service: Endoscopy;  Laterality: N/A;    FISTULOGRAM Bilateral 2024    Procedure: Fistulogram;  Surgeon: Irish Castanon MD;  Location: [facility] CATH/EP LAB;  Service: General;  Laterality: Bilateral;    PERICARDIAL WINDOW      TOTAL ABDOMINAL HYSTERECTOMY  1998    Done after C/S. Ovaries conserved. Due to "cysts on the uterus"     Social History     Tobacco Use    Smoking status: Never    Smokeless tobacco: Never   Substance Use Topics    Alcohol use: Never    Drug use: Never     Family History   Problem Relation Age of Onset    Heart disease Mother     Diabetes Mother     COPD Mother     Lung cancer Mother 85        lung CA    Heart disease Father     Stroke Father         cerebral aneurysm    Early " death Father     Diabetes Sister     COPD Sister     Lung cancer Sister         lung CA    Depression Daughter     Colon cancer Maternal Aunt         colon CA        Review of Systems:   Constitution: Negative for diaphoresis and fever.   HEENT: Negative for nosebleeds.    Cardiovascular: Negative for chest pain       No dyspnea on exertion       No leg swelling        No palpitations  Post dialysis has orthopnea.  Respiratory: Negative for shortness of breath and wheezing.    Hematologic/Lymphatic: Negative for bleeding problem. Does not bruise/bleed easily.   Skin: Negative for color change and rash.   Musculoskeletal: Negative for falls and myalgias.   Gastrointestinal: Negative for hematemesis and hematochezia.   Genitourinary: Negative for hematuria.   Neurological: Negative for dizziness and light-headedness.   Psychiatric/Behavioral: Negative for altered mental status and memory loss.          Objective:        Vitals:    03/19/24 1451   BP: 132/76   Pulse: 76   Resp: 16       Lab Results   Component Value Date    WBC 6.95 12/07/2023    HGB 11.5 (L) 12/07/2023    HCT 34.6 (L) 12/07/2023     12/07/2023    CHOL 121 12/07/2023    TRIG 91 12/07/2023    HDL 35 (L) 12/07/2023    ALT 8 (L) 12/07/2023    AST 12 12/07/2023     02/20/2024    K 4.6 02/20/2024     02/20/2024    CREATININE 9.7 (H) 02/20/2024    BUN 65 (H) 02/20/2024    CO2 30 (H) 02/20/2024    TSH 2.526 12/07/2023    INR 1.0 03/10/2023    HGBA1C 5.8 08/29/2023        ECHOCARDIOGRAM RESULTS  Results for orders placed during the hospital encounter of 08/29/23    Echo    Interpretation Summary    Left Ventricle: The left ventricle is normal in size. Increased ventricular mass. Severely increased wall thickness. There is severe concentric hypertrophy. Normal wall motion. There is normal systolic function. Ejection fraction by visual approximation is 65%. There is normal diastolic function.    Left Atrium: Left atrium is mildly dilated.     Right Ventricle: Normal right ventricular cavity size. Wall thickness is normal. Right ventricle wall motion  is normal. Systolic function is normal.    IVC/SVC: Normal venous pressure at 3 mmHg.        CURRENT/PREVIOUS VISIT EKG  Results for orders placed or performed during the hospital encounter of 02/20/24   EKG 12-lead    Collection Time: 02/20/24 10:52 AM   Result Value Ref Range    QRS Duration 70 ms    OHS QTC Calculation 468 ms    Narrative    Test Reason : Z01.810,    Vent. Rate : 074 BPM     Atrial Rate : 074 BPM     P-R Int : 142 ms          QRS Dur : 070 ms      QT Int : 422 ms       P-R-T Axes : 034 -13 010 degrees     QTc Int : 468 ms    Normal sinus rhythm with sinus arrhythmia  Normal ECG  When compared with ECG of 10-MAR-2023 14:11,  No significant change was found  Confirmed by Filiberto Howell MD ([id number]) on 2/29/2024 12:18:02 PM    Referred By: CHRIS RICHARDS           Confirmed By:Filiberto Howell MD     No valid procedures specified.   Results for orders placed during the hospital encounter of 07/25/23    Nuclear Stress - Cardiology Interpreted    Interpretation Summary    Normal myocardial perfusion scan. There is no evidence of myocardial ischemia or infarction.    There is a trivial to mild intensity fixed perfusion abnormality in the inferolateral wall of the left ventricle, secondary to soft tissue attenuation.    The gated perfusion images showed an ejection fraction of 66% at rest. The gated perfusion images showed an ejection fraction of 62% post stress. Normal ejection fraction is greater than 53%.    There is normal wall motion at rest and post stress.    LV cavity size is normal at rest and normal at stress.    The ECG portion of the study is negative for ischemia.    The patient reported no chest pain during the stress test.    There were no arrhythmias during stress.      Physical Exam:  CONSTITUTIONAL: No fever, no chills  HEENT: Normocephalic, atraumatic,pupils reactive to light                  NECK:  No JVD no carotid bruit  CVS: S1S2+, RRR, systolic murmurs,   LUNGS: Clear  ABDOMEN: Soft, NT, BS+  EXTREMITIES: No cyanosis, edema  : No last catheter  NEURO: AAO X 3  PSY: Normal affect      Medication List with Changes/Refills   Current Medications    AMLODIPINE (NORVASC) 5 MG TABLET    Take 1 tablet (5 mg total) by mouth once daily.    ASPIRIN (ECOTRIN) 81 MG EC TABLET    Take 81 mg by mouth once daily.    CALCITRIOL (ROCALTROL) 0.25 MCG CAP    Take 0.25 mcg by mouth once daily.    CARVEDILOL (COREG) 12.5 MG TABLET    Take 1 tablet (12.5 mg total) by mouth 2 (two) times daily with meals.    CINACALCET (SENSIPAR) 30 MG TAB    Take 30 mg by mouth Daily.    LIDOCAINE-PRILOCAINE (EMLA) CREAM    Apply 1 each topically as needed.    OMEPRAZOLE (PRILOSEC) 40 MG CAPSULE    Take 1 capsule (40 mg total) by mouth once daily.    ONDANSETRON (ZOFRAN) 4 MG TABLET    Take 1 tablet (4 mg total) by mouth every 6 (six) hours as needed for Nausea.    ROSUVASTATIN (CRESTOR) 10 MG TABLET    Take 1 tablet (10 mg total) by mouth once daily.    SAXAGLIPTIN (ONGLYZA) 2.5 MG TAB TABLET    Take 1 tablet (2.5 mg total) by mouth once daily.    TIRZEPATIDE (MOUNJARO) 5 MG/0.5 ML PNIJ    Inject 5 mg into the skin every 7 days.             Assessment:       1. Hypertrophic cardiomyopathy    2. Pericardial effusion    3. ESRD on hemodialysis    4. History of subdural hematoma    5. Morbid obesity with body mass index (BMI) of 40.0 to 49.9    6. Bilateral lower extremity edema    7. Ear drainage, left         Plan:   1. Hypertrophic cardiomyopathy   She is currently on carvedilol 12.5 mg p.o. b.i.d. continue the same.  On her echocardiogram she has concentric left ventricle hypertrophy which is severe.    2. Pericardial effusion new    There is no significant pericardial effusion on her previous echo.  Will continue to monitor.    3. End-stage renal disease on hemodialysis   Patient after dialysis has significant orthopnea  and she has to sit up for a few hours before she can lie down.  And her BUN is 65 creatinine is 9.7.  Potassium is 4.6.  4. Subdural hematoma   Patient is stable at the present time she is on aspirin 81 mg p.o. daily.  5. Bilateral lower extremity edema   She has dependent edema.  Patient needs to be more active and she feels better and there is no edema post dialysis.  6. Obesity   Patient is stable at the present time.  Continue current management.  Continue low-fat low-cholesterol diet.    7. Ear drainage due for ENT surgery.    Patient is at moderate risk for cardiac events.  8. EKG   Reviewed EKG independently patient is in normal sinus rhythm with sinus arrhythmia with a heart rate of 74 beats per minute no acute ST T-wave changes.  9. I will see her back in the office in 6 months time.            Problem List Items Addressed This Visit          Neuro    History of subdural hematoma       ENT    Ear drainage, left       Cardiac/Vascular    Pericardial effusion    Hypertrophic cardiomyopathy - Primary       Renal/    ESRD on hemodialysis       Endocrine    Morbid obesity with body mass index (BMI) of 40.0 to 49.9     Other Visit Diagnoses       Bilateral lower extremity edema                No follow-ups on file.

## 2024-03-19 NOTE — LETTER
2024    Marian Dumont  [address]  [place] [address]           [facility] [place]  [address]    [place] [address]  Phone: [phone]  Fax: [phone] Patient: Marian Dumont  : 1960  Referring Doctor:Dr. Cheo Ashley  Ear Surgery   Current Outpatient Medications   Medication Sig    amLODIPine (NORVASC) 5 MG tablet Take 1 tablet (5 mg total) by mouth once daily.    aspirin (ECOTRIN) 81 MG EC tablet Take 81 mg by mouth once daily.    calcitRIOL (ROCALTROL) 0.25 MCG Cap Take 0.25 mcg by mouth once daily.    carvediloL (COREG) 12.5 MG tablet Take 1 tablet (12.5 mg total) by mouth 2 (two) times daily with meals.    cinacalcet (SENSIPAR) 30 MG Tab Take 30 mg by mouth Daily.    LIDOcaine-prilocaine (EMLA) cream Apply 1 each topically as needed.    omeprazole (PRILOSEC) 40 MG capsule Take 1 capsule (40 mg total) by mouth once daily.    ondansetron (ZOFRAN) 4 MG tablet Take 1 tablet (4 mg total) by mouth every 6 (six) hours as needed for Nausea.    rosuvastatin (CRESTOR) 10 MG tablet Take 1 tablet (10 mg total) by mouth once daily.    SAXagliptin (ONGLYZA) 2.5 mg Tab tablet Take 1 tablet (2.5 mg total) by mouth once daily.    tirzepatide (MOUNJARO) 5 mg/0.5 mL PnIj Inject 5 mg into the skin every 7 days.     No current facility-administered medications for this visit.     This patient has been assessed for risk factors for clearance of surgery with the following stipulations:  ___ No contraindications  ___ Recommendations for antiplatelet/anticoagulant medications:  __x_ Cleared for surgery with the following contraindications/precautions:  Hold Aspirin for 7 days if needed. Restart asap.   Moderate risk  ___ Not cleared for surgery due to the following reasons:      If you have any questions regarding the above, please contact my office at [phone].    Sincerely,    .   Dr Brett Howell

## 2024-03-26 PROBLEM — Z76.89 ENCOUNTER FOR WEIGHT MANAGEMENT: Status: ACTIVE | Noted: 2024-03-26

## 2024-04-02 ENCOUNTER — OFFICE VISIT (OUTPATIENT)
Dept: PRIMARY CARE CLINIC | Facility: CLINIC | Age: 64
End: 2024-04-02
Payer: MEDICARE

## 2024-04-02 ENCOUNTER — TELEPHONE (OUTPATIENT)
Dept: PHARMACY | Facility: CLINIC | Age: 64
End: 2024-04-02
Payer: MEDICARE

## 2024-04-02 VITALS
WEIGHT: 282.31 LBS | DIASTOLIC BLOOD PRESSURE: 76 MMHG | HEART RATE: 62 BPM | SYSTOLIC BLOOD PRESSURE: 130 MMHG | OXYGEN SATURATION: 98 % | RESPIRATION RATE: 18 BRPM | BODY MASS INDEX: 45.56 KG/M2

## 2024-04-02 DIAGNOSIS — Z99.2 HEMODIALYSIS PATIENT: ICD-10-CM

## 2024-04-02 DIAGNOSIS — I42.2 HYPERTROPHIC CARDIOMYOPATHY: ICD-10-CM

## 2024-04-02 DIAGNOSIS — E11.22 TYPE 2 DIABETES MELLITUS WITH CHRONIC KIDNEY DISEASE ON CHRONIC DIALYSIS, WITHOUT LONG-TERM CURRENT USE OF INSULIN: ICD-10-CM

## 2024-04-02 DIAGNOSIS — Z99.2 TYPE 2 DIABETES MELLITUS WITH CHRONIC KIDNEY DISEASE ON CHRONIC DIALYSIS, WITHOUT LONG-TERM CURRENT USE OF INSULIN: ICD-10-CM

## 2024-04-02 DIAGNOSIS — I10 PRIMARY HYPERTENSION: ICD-10-CM

## 2024-04-02 DIAGNOSIS — Z12.31 ENCOUNTER FOR SCREENING MAMMOGRAM FOR MALIGNANT NEOPLASM OF BREAST: ICD-10-CM

## 2024-04-02 DIAGNOSIS — E66.01 MORBID OBESITY WITH BODY MASS INDEX (BMI) OF 40.0 TO 49.9: Primary | ICD-10-CM

## 2024-04-02 DIAGNOSIS — Z12.39 ENCOUNTER FOR SCREENING FOR MALIGNANT NEOPLASM OF BREAST, UNSPECIFIED SCREENING MODALITY: ICD-10-CM

## 2024-04-02 DIAGNOSIS — N18.6 TYPE 2 DIABETES MELLITUS WITH CHRONIC KIDNEY DISEASE ON CHRONIC DIALYSIS, WITHOUT LONG-TERM CURRENT USE OF INSULIN: ICD-10-CM

## 2024-04-02 PROCEDURE — 99999 PR PBB SHADOW E&M-EST. PATIENT-LVL IV: CPT | Mod: PBBFAC,,, | Performed by: INTERNAL MEDICINE

## 2024-04-02 PROCEDURE — 99214 OFFICE O/P EST MOD 30 MIN: CPT | Mod: S$PBB,,, | Performed by: INTERNAL MEDICINE

## 2024-04-02 PROCEDURE — 99214 OFFICE O/P EST MOD 30 MIN: CPT | Mod: PBBFAC,PN | Performed by: INTERNAL MEDICINE

## 2024-04-02 NOTE — LETTER
May 21, 2024    Marian Dumont  [address] [address]             [facility] - Pharmacy Assistance  [address]  [address]  [facility] [address]  Fax: [phone] Dear Ms. Dumont    My Name is Erik Flowers. I am a Pharmacy Technician reaching out on behalf of [employer] [facility] Patient Assistance Team. We last spoke on 04/26/24 about assistance with your medication. A letter was sent to your [facility] Portal requesting documentation required to begin the Pharmacy Assistance Process. Unfortunately, we have not heard back from you. Please reach out to my phone number below if you are still in need of assistance with your medications. We look forward to hearing from you soon!     Thank you for choosing [facility] for your healthcare needs.      Erik Flowers  Pharmacy Patient Assistance           -- Message is from the [facility]--    Patient is requesting a medication refill - medication is on active medication list    Patient is currently OUT of the requested medication.    Was Medication Pended?  Yes.    Rx Name and Dose:  fluticasone (FLONASE) 50 MCG/ACT nasal spray  tiZANidine (ZANAFLEX) 2 MG tablet    Duration: 30 days    Pharmacy  [id number] [address]    Patient confirmed the above pharmacy as correct?  Yes    Caller Information       Type Contact Phone    08/26/2019 11:01 PM Phone (Incoming) Santa Rodriguez (Self) [phone] (M)          Alternative phone number:     Turnaround time given to caller:   \"This message will be sent to [state Provider's name]. The clinical team will fulfill your request as soon as they review your message.\"

## 2024-04-02 NOTE — PATIENT INSTRUCTIONS
Try to track you food intake everyday. This really helps  Try to follow up with the nutritist and bring your food log.   Try to go to the gym and do some light weights.

## 2024-04-02 NOTE — LETTER
April 3, 2024    Marian Paganer  [address] [address]              Dear Ms. Marian Dumont     It was a pleasure speaking with you. To follow up on our conversation on 4/3/2024, the Pharmacy Patient Assistance Program needs more information from you before we can submit your Ozempic 0.25 mg and Ozempic 0.5mg application to the [employer] Nordisk Program. Please return the following documents to the Pharmacy Patient Assistance office ASAP.  Fax requested documentation to [phone] or email [email or web address]. Documentation can also be mailed to address listed below       Proof of household Income( such as social security statement, 1099 form, pension statement or 3 consecutive pay stubs, Copy of all Insurance cards( front and back), and Signed and dated HIPAA /Patient Information Forms              Whats Next:     Once I receive your documentation and authorization from your Provider, your application will be submitted to the [employer] Assistance Program for review. Please be advised it will take 2 to 4 weeks for your application to be processed so you may have to purchase a month's supply of medication from your pharmacy to hold you over during the waiting period. You will be notified of approval or denial by The Program(mail) or myself.      If you have any questions or concerns, please give me a call         Sincerely   Erik Flowers   Pharmacy Patient Assistance  [address]  [place] [address]  Phone: [phone]  Fax: [phone]  Email: [email or web address]

## 2024-04-02 NOTE — TELEPHONE ENCOUNTER
We have reviewed Ms. Dumont current medication list and/or insurance status. Unfortunately, The Pharmacy Patient Assistance Team is unable to assist at this time due to the following reasons      There are no Manufacture or Co-pay Savings Programs available for requested medication.               Pharmacy Patient Assistance Team

## 2024-04-02 NOTE — PROGRESS NOTES
"INTERNAL MEDICINE PROGRESS/URGENT CARE NOTE    CHIEF COMPLAINT     Chief Complaint   Patient presents with    Follow-up     8 week f/u     Weight Check       HPI     Marian uDmont is a 64 y.o.  female who presents with a PMHx of  EDRD on HD, DM2, HLD, HTN, anemia, GERD, Morbid obesity, who presents today for  an ollow up visit today.  Here for weight check   Trying to work on weight  loss so she can be referred for kidney transplant.   Went to one session of the therapy and canceled her next appointment  No notable weight loss on her chart here but says she has lost some weight on her scale at home  Having trouble with her diet because everything weve told her to eat she says is affecting her lytes at dialysis. Told her to eat yogurt and blueberries, she does and the nutritionist told her it was raising her calcium too high  Previous weight was 308, today 271. Her wieght has been stable for the past few months. Admit that she's "fallen off" her diet. Has not gone to the gym as we discussed.   Health she is doing pretty well. With the help of her ghassan daughter, she's maintained all her appointments, has followed up with all instructions and is doing very well.      [facility] and to keep the weight off      Hgb a1c has improved frmo 7.4 down to 5.8!!! Wonderful.   She is now scheduled to have her colonoscopy and she has her eye eye exam scheduled to be completed.        Home Medications:  Prior to Admission medications    Medication Sig Start Date End Date Taking? Authorizing Provider   amLODIPine (NORVASC) 5 MG tablet Take 1 tablet (5 mg total) by mouth once daily. 3/5/24   Mohini Majano MD   aspirin (ECOTRIN) 81 MG EC tablet Take 81 mg by mouth once daily.    Provider, Historical   calcitRIOL (ROCALTROL) 0.25 MCG Cap Take 0.25 mcg by mouth once daily.    Provider, Historical   carvediloL (COREG) 12.5 MG tablet Take 1 tablet (12.5 mg total) by mouth 2 (two) times daily " with meals. 3/5/24   Mohini Majano MD   cinacalcet (SENSIPAR) 30 MG Tab Take 30 mg by mouth Daily. 1/18/23   Provider, Historical   LIDOcaine-prilocaine (EMLA) cream Apply 1 each topically as needed. 1/26/23   Provider, Historical   omeprazole (PRILOSEC) 40 MG capsule Take 1 capsule (40 mg total) by mouth once daily. 8/10/23   Eugenie Paz, NP   ondansetron (ZOFRAN) 4 MG tablet Take 1 tablet (4 mg total) by mouth every 6 (six) hours as needed for Nausea. 3/12/23   Sean العلي MD   rosuvastatin (CRESTOR) 10 MG tablet Take 1 tablet (10 mg total) by mouth once daily. 7/18/23   Mohini Majano MD   SAXagliptin (ONGLYZA) 2.5 mg Tab tablet Take 1 tablet (2.5 mg total) by mouth once daily. 9/5/23 9/4/24  Mohini Majano MD   tirzepatide (MOUNJARO) 5 mg/0.5 mL PnIj Inject 5 mg into the skin every 7 days. 12/5/23   Mohini Majano MD       Review of Systems:  Review of Systems          PHYSICAL EXAM     /76 (BP Location: Left arm, Patient Position: Sitting, BP Method: Medium (Manual))   Pulse 62   Resp 18   Wt 128 kg (282 lb 4.8 oz)   SpO2 98%   BMI 45.56 kg/m²     GEN - A+OX4, NAD   HEENT - PERRL, EOMI, OP clear  Neck - No thyromegaly or cervical LAD. No thyroid masses felt.  CV - RRR, no m/r   Chest - CTAB, no wheezing or rhonchi  Abd - S/NT/ND/+BS.   Ext - 2+BDP and radial pulses. No C/C/E.  Skin - No rash.    LABS       ASSESSMENT/PLAN     Marian Dumont is a 64 y.o. female with  1. Morbid obesity with body mass index (BMI) of 40.0 to 49.9  Losing weight. Has stabilized but will again go back to her learned eating habits.   Discussed going back to the nutritionist, going to the gym    2. Type 2 diabetes mellitus with chronic kidney disease on chronic dialysis, without long-term current use of insulin  Very well controlled. Contiue current medications.   Overview:  Not at goal. Discussed lowering carb intake or will add medication in 3 months     Orders:  -     Hemoglobin  A1C; Future; Expected date: 04/02/2024  -     Microalbumin/Creatinine Ratio, Urine; Future; Expected date: 04/02/2024  -     Ambulatory referral/consult to Pharmacy Assistance; Future; Expected date: 04/09/2024    3. Primary hypertension  Overview:  BP well controlled today  Continue with current regimen       4. Encounter for screening for malignant neoplasm of breast, unspecified screening modality  -     Mammo Digital Screening Bilat; Future; Expected date: 07/02/2024    5. Encounter for screening mammogram for malignant neoplasm of breast  -     Mammo Digital Screening Bilat; Future; Expected date: 07/02/2024    6. Hypertrophic cardiomyopathy  Asymptomatic   Follows with cardiology  Continue current medications.   On BB   Control BP.     7. Hemodialysis patient  Going well. Just had her port flushed and changed.   No complications           WORRY SCORE     RTC in 1 months, sooner if needed and depending on labs.    Mohini Majano MD  Board Certified Internist/Geriatrician  [facility]

## 2024-04-03 RX ORDER — SEMAGLUTIDE 0.68 MG/ML
0.5 INJECTION, SOLUTION SUBCUTANEOUS
Qty: 4 EACH | Refills: 3 | Status: SHIPPED | OUTPATIENT
Start: 2024-04-03

## 2024-04-03 NOTE — TELEPHONE ENCOUNTER
I have reached out to Marian Dumont to inform her of the [employer] Nordisk application process for Ozempic 0.25 mg and Ozempic 0.5mg and whats required to apply.  Marian Dumont did not answer. I left a voicemail and mailed a letter introducing her to the pharmacy patient assistance program. I will follow up in 5 business days.

## 2024-04-03 NOTE — TELEPHONE ENCOUNTER
Thank you. Do we have anything for ozempic or the other diabetic wieght loss drugs I could order for her. We are trying to have her lose a lot of weight to get on a kidney transplant referral and she is a diabetic.

## 2024-04-15 NOTE — TELEPHONE ENCOUNTER
A 2nd attempt has been made to retrieve requested documents from Marian Dumont  via MY CHART. The final contact attempt will be made in 5 business days

## 2024-04-26 NOTE — TELEPHONE ENCOUNTER
I have spoken with Marian Dumont and informed her of the Gwendol[employer] Nordisk application process for Ozempic 0.25 mg and Ozempic 0.5mg and what's required to apply.  Marian Dumont will provide the following documents: Proof of household Income( such as social security statement, 1099 form, pension statement or 3 consecutive pay stubs, Copy of all Insurance cards( front and back), and Signed and dated HIPAA /Patient Information Forms        I will follow up with the patient in 5 business days.

## 2024-05-09 NOTE — TELEPHONE ENCOUNTER
A 2nd attempt has been made to establish contact with Marian Dumont  via Letter. The final contact attempt will be made in 5 business days

## 2024-05-21 NOTE — TELEPHONE ENCOUNTER
Marian Dumont was scheduled on 05/03/24 to provide the following documentation to initiate the application process.        Proof of household Income( such as social security statement, 1099 form, pension statement or 3 consecutive pay stubs, Copy of all Insurance cards( front and back), and Signed and dated HIPAA /Patient Information Forms         As of today, the documents have not been received.  Reminder letter sent. We are unable to move forward with application process until requested documentation is received. Requested documentation can be submitted by email or fax to the Pharmacy Patient Assistance Team.  Call [phone] with any follow-up questions.       Pharmacy Patient Assistance  [address]  [address] [place] [id number]  Fax: [phone]  Email: [email or web address]

## 2024-05-22 ENCOUNTER — PATIENT MESSAGE (OUTPATIENT)
Dept: PRIMARY CARE CLINIC | Facility: CLINIC | Age: 64
End: 2024-05-22
Payer: MEDICARE

## 2024-05-23 NOTE — TELEPHONE ENCOUNTER
Patient's daughter states you guness talked about possibly prescribing Rybelsus for Ms. Dumont because another family member is on it and it works well for them. They want to know what dose you recommend of it.     They are working with the pharmacy patient assistance program on getting it.     Awake

## 2024-06-07 RX ORDER — CARVEDILOL 12.5 MG/1
12.5 TABLET ORAL
Qty: 90 TABLET | Refills: 1 | Status: SHIPPED | OUTPATIENT
Start: 2024-06-07

## 2024-06-14 NOTE — TELEPHONE ENCOUNTER
Marian Dumont provided the following documentation  .   Proof of household Income( such as social security statement, 1099 form, pension statement or 3 consecutive pay stubs  Signed and dated HIPAA /Patient Information Forms        The following documentation still required to begin the enrollment process.      Copy of all Insurance cards( front and back)     The Pharmacy Patient Assistance Team is unable to proceed with application submission until documentation is received.

## 2024-06-24 NOTE — TELEPHONE ENCOUNTER
Hello,       A Patient Assistance Application for Marian Dumont - MRN  [id number] was faxed to your office @[phone]. Please have Mohini Majano MD, review the application to ensure the prescription is correct. If correct, sign and fax the application back to the Pharmacy Patient Assistance Team @[phone].     Please do not write any corrections on this application.  If changes are needed on this application, please inform the PAP team, and the corrected application will be faxed back to your office for approval and signature.            Pharmacy Patient Assistance  [address] [place] [address]  [id number]  [place] [address]  Fax: [phone]  Email: [email or web address]

## 2024-06-25 ENCOUNTER — HOSPITAL ENCOUNTER (OUTPATIENT)
Dept: RADIOLOGY | Facility: CLINIC | Age: 64
Discharge: HOME | End: 2024-06-25
Attending: INTERNAL MEDICINE
Payer: MEDICARE

## 2024-06-25 DIAGNOSIS — Z12.31 ENCOUNTER FOR SCREENING MAMMOGRAM FOR MALIGNANT NEOPLASM OF BREAST: ICD-10-CM

## 2024-06-25 DIAGNOSIS — Z12.39 ENCOUNTER FOR SCREENING FOR MALIGNANT NEOPLASM OF BREAST, UNSPECIFIED SCREENING MODALITY: ICD-10-CM

## 2024-06-25 PROCEDURE — 77067 SCR MAMMO BI INCL CAD: CPT | Mod: 26,,, | Performed by: RADIOLOGY

## 2024-06-25 PROCEDURE — 77063 BREAST TOMOSYNTHESIS BI: CPT | Mod: 26,,, | Performed by: RADIOLOGY

## 2024-06-25 PROCEDURE — 77067 SCR MAMMO BI INCL CAD: CPT | Mod: TC,PO

## 2024-07-26 ENCOUNTER — HOSPITAL ENCOUNTER (EMERGENCY)
Facility: HOSPITAL | Age: 64
Discharge: HOME | End: 2024-07-26
Attending: EMERGENCY MEDICINE
Payer: MEDICARE

## 2024-07-26 VITALS
BODY MASS INDEX: 45.32 KG/M2 | HEART RATE: 107 BPM | OXYGEN SATURATION: 95 % | HEIGHT: 66 IN | DIASTOLIC BLOOD PRESSURE: 53 MMHG | WEIGHT: 282 LBS | RESPIRATION RATE: 16 BRPM | TEMPERATURE: 99 F | SYSTOLIC BLOOD PRESSURE: 108 MMHG

## 2024-07-26 DIAGNOSIS — I95.9 HYPOTENSION, UNSPECIFIED HYPOTENSION TYPE: ICD-10-CM

## 2024-07-26 DIAGNOSIS — R07.9 CHEST PAIN, UNSPECIFIED TYPE: Primary | ICD-10-CM

## 2024-07-26 DIAGNOSIS — R07.9 CHEST PAIN: ICD-10-CM

## 2024-07-26 LAB
ALBUMIN SERPL BCP-MCNC: 3.9 G/DL (ref 3.5–5.2)
ALP SERPL-CCNC: 67 U/L (ref 55–135)
ALT SERPL W/O P-5'-P-CCNC: 9 U/L (ref 10–44)
ANION GAP SERPL CALC-SCNC: 18 MMOL/L (ref 8–16)
AST SERPL-CCNC: 11 U/L (ref 10–40)
BASOPHILS # BLD AUTO: 0.05 K/UL (ref 0–0.2)
BASOPHILS NFR BLD: 0.4 % (ref 0–1.9)
BILIRUB SERPL-MCNC: 0.3 MG/DL (ref 0.1–1)
BNP SERPL-MCNC: 44 PG/ML (ref 0–99)
BUN SERPL-MCNC: 34 MG/DL (ref 8–23)
CALCIUM SERPL-MCNC: 9.1 MG/DL (ref 8.7–10.5)
CHLORIDE SERPL-SCNC: 93 MMOL/L (ref 95–110)
CO2 SERPL-SCNC: 28 MMOL/L (ref 23–29)
CREAT SERPL-MCNC: 7 MG/DL (ref 0.5–1.4)
DIFFERENTIAL METHOD BLD: ABNORMAL
EOSINOPHIL # BLD AUTO: 0.2 K/UL (ref 0–0.5)
EOSINOPHIL NFR BLD: 2.1 % (ref 0–8)
ERYTHROCYTE [DISTWIDTH] IN BLOOD BY AUTOMATED COUNT: 14.2 % (ref 11.5–14.5)
EST. GFR  (NO RACE VARIABLE): 6 ML/MIN/1.73 M^2
GLUCOSE SERPL-MCNC: 113 MG/DL (ref 70–110)
HCT VFR BLD AUTO: 35.6 % (ref 37–48.5)
HGB BLD-MCNC: 11.4 G/DL (ref 12–16)
IMM GRANULOCYTES # BLD AUTO: 0.07 K/UL (ref 0–0.04)
IMM GRANULOCYTES NFR BLD AUTO: 0.6 % (ref 0–0.5)
LYMPHOCYTES # BLD AUTO: 2.6 K/UL (ref 1–4.8)
LYMPHOCYTES NFR BLD: 22.1 % (ref 18–48)
MAGNESIUM SERPL-MCNC: 1.5 MG/DL (ref 1.6–2.6)
MCH RBC QN AUTO: 29.1 PG (ref 27–31)
MCHC RBC AUTO-ENTMCNC: 32 G/DL (ref 32–36)
MCV RBC AUTO: 91 FL (ref 82–98)
MONOCYTES # BLD AUTO: 0.9 K/UL (ref 0.3–1)
MONOCYTES NFR BLD: 7.7 % (ref 4–15)
NEUTROPHILS # BLD AUTO: 7.9 K/UL (ref 1.8–7.7)
NEUTROPHILS NFR BLD: 67.1 % (ref 38–73)
NRBC BLD-RTO: 0 /100 WBC
PLATELET # BLD AUTO: 264 K/UL (ref 150–450)
PMV BLD AUTO: 11.5 FL (ref 9.2–12.9)
POTASSIUM SERPL-SCNC: 4.1 MMOL/L (ref 3.5–5.1)
PROT SERPL-MCNC: 7.5 G/DL (ref 6–8.4)
RBC # BLD AUTO: 3.92 M/UL (ref 4–5.4)
SODIUM SERPL-SCNC: 139 MMOL/L (ref 136–145)
TROPONIN I SERPL HS-MCNC: 21.5 PG/ML (ref 0–14.9)
TROPONIN I SERPL HS-MCNC: 23.5 PG/ML (ref 0–14.9)
TROPONIN I SERPL HS-MCNC: 24.2 PG/ML (ref 0–14.9)
WBC # BLD AUTO: 11.7 K/UL (ref 3.9–12.7)

## 2024-07-26 PROCEDURE — 83735 ASSAY OF MAGNESIUM: CPT | Performed by: PHYSICIAN ASSISTANT

## 2024-07-26 PROCEDURE — 80053 COMPREHEN METABOLIC PANEL: CPT | Performed by: PHYSICIAN ASSISTANT

## 2024-07-26 PROCEDURE — 93005 ELECTROCARDIOGRAM TRACING: CPT | Performed by: GENERAL PRACTICE

## 2024-07-26 PROCEDURE — 99285 EMERGENCY DEPT VISIT HI MDM: CPT | Mod: 25

## 2024-07-26 PROCEDURE — 84484 ASSAY OF TROPONIN QUANT: CPT | Mod: 91 | Performed by: EMERGENCY MEDICINE

## 2024-07-26 PROCEDURE — 93010 ELECTROCARDIOGRAM REPORT: CPT | Mod: 76,,, | Performed by: GENERAL PRACTICE

## 2024-07-26 PROCEDURE — 85025 COMPLETE CBC W/AUTO DIFF WBC: CPT | Performed by: PHYSICIAN ASSISTANT

## 2024-07-26 PROCEDURE — 83880 ASSAY OF NATRIURETIC PEPTIDE: CPT | Performed by: PHYSICIAN ASSISTANT

## 2024-07-26 PROCEDURE — 93010 ELECTROCARDIOGRAM REPORT: CPT | Mod: ,,, | Performed by: GENERAL PRACTICE

## 2024-07-26 PROCEDURE — 84484 ASSAY OF TROPONIN QUANT: CPT | Performed by: PHYSICIAN ASSISTANT

## 2024-07-26 NOTE — FIRST PROVIDER EVALUATION
Emergency Department TeleTriage Encounter Note      CHIEF COMPLAINT    Chief Complaint   Patient presents with    Shortness of Breath    Chest Pain    Vomiting     After dialysis this morning, started with cp, sob, vomiting and weakness       VITAL SIGNS   Initial Vitals [07/26/24 1044]   BP Pulse Resp Temp SpO2   (!) 154/70 91 20 99.5 °F (37.5 °C) (!) 94 %      MAP       --            ALLERGIES    Review of patient's allergies indicates:   Allergen Reactions    Ace inhibitors Anaphylaxis, Swelling and Other (See Comments)     Other reaction(s): Angioedema       PROVIDER TRIAGE NOTE  This is a teletriage evaluation of a 64 y.o. female presenting to the ED complaining of chest pain. Patient reports chest pain, shortness of breath, and vomiting after dialysis this morning. She states pain radiated into her back.     Patient is alert and oriented. She speaks in complete sentences. She is sitting upright in the chair in no distress.     Initial orders will be placed and care will be transferred to an alternate provider when patient is roomed for a full evaluation. Any additional orders and the final disposition will be determined by that provider.         ORDERS  Labs Reviewed   MAGNESIUM   CBC W/ AUTO DIFFERENTIAL   COMPREHENSIVE METABOLIC PANEL   TROPONIN I HIGH SENSITIVITY   TROPONIN I HIGH SENSITIVITY   B-TYPE NATRIURETIC PEPTIDE       ED Orders (720h ago, onward)      Start Ordered     Status Ordering Provider    07/26/24 1315 07/26/24 1115  Troponin I High Sensitivity #2  Once Timed         Ordered MONICA HOLGUIN.    07/26/24 1115 07/26/24 1115  Magnesium  STAT         Ordered MONICA HOLGUIN.    07/26/24 1115 07/26/24 1115  Vital signs  Every 15 min         Ordered MONICA HOLGUIN G.    07/26/24 1115 07/26/24 1115  Cardiac Monitoring - Adult  Continuous        Comments: Notify Physician If:    Ordered CODY HOLGUINY G.    07/26/24 1115 07/26/24 1115  Pulse Oximetry Continuous  Continuous         Ordered MONICA HOLGUIN  JUDD.    07/26/24 1115 07/26/24 1115  Diet NPO  Diet effective now         Ordered MONICA HOLGUIN.    07/26/24 1115 07/26/24 1115  Saline lock IV  Once         Ordered MONICA HOGLUIN.    07/26/24 1115 07/26/24 1115  EKG 12-lead  Once        Comments: Do not perform if previously done during this visit/ triage    Completed by SHAYAN MOORE on 7/26/2024 at 11:16 AM MONICA HOLGUIN.    07/26/24 1115 07/26/24 1115  CBC auto differential  STAT         Ordered MONICA HOLGUIN.    07/26/24 1115 07/26/24 1115  Comprehensive metabolic panel  STAT         Ordered MONICA HOLGUIN.    07/26/24 1115 07/26/24 1115  Troponin I High Sensitivity #1  STAT         Ordered MONICA HOLGUIN.    07/26/24 1115 07/26/24 1115  B-Type natriuretic peptide (BNP)  STAT         Ordered MONICA HOLGUIN.    07/26/24 1115 07/26/24 1115  X-Ray Chest AP Portable  1 time imaging         Ordered MONICA HOLGUIN              Virtual Visit Note: The provider triage portion of this emergency department evaluation and documentation was performed via [employer], a HIPAA-compliant telemedicine application, in concert with a tele-presenter in the room. A face to face patient evaluation with one of my colleagues will occur once the patient is placed in an emergency department room.      DISCLAIMER: This note was prepared with [employer] voice recognition transcription software. Garbled syntax, mangled pronouns, and other bizarre constructions may be attributed to that software system.

## 2024-07-26 NOTE — ED PROVIDER NOTES
Encounter Date: 2024       History     Chief Complaint   Patient presents with    Shortness of Breath    Chest Pain    Vomiting     After dialysis this morning, started with cp, sob, vomiting and weakness     64-year-old female with a past medical history of CAD, diabetes mellitus, reflux disease, hypertension, and chronic kidney disease on dialysis presents for evaluation of an episode of chest pain.  The patient reports that she had her normal dialysis this morning but that she developed an episode low blood pressure with a systolic blood pressure into the 90s while getting her dialysis.  She reports that after that she went home and had an episode chest pain that was associated with diaphoresis, nausea, and trouble breathing.  She reports the pain radiated to her back.  She denies any further pain at present.  She also denies any associated cough, congestion, fever/chills, abdominal pain, vomiting, or diarrhea.  There are no alleviating or aggravating factors.      Review of patient's allergies indicates:   Allergen Reactions    Ace inhibitors Anaphylaxis, Swelling and Other (See Comments)     Other reaction(s): Angioedema     Past Medical History:   Diagnosis Date    Anemia     Coronary artery disease     COVID-19     Diabetes mellitus     Diabetes mellitus, type 2     Dialysis patient     Disorder of kidney and ureter     GERD (gastroesophageal reflux disease)     Hypertension     Mixed hyperlipidemia     Obese body habitus     Sleep apnea      Past Surgical History:   Procedure Laterality Date    BRAIN SURGERY       SECTION  1998    CHOLECYSTECTOMY      COLONOSCOPY N/A 10/12/2023    Procedure: COLONOSCOPY;  Surgeon: Keren Villalobos MD;  Location: [facility];  Service: Endoscopy;  Laterality: N/A;    FISTULOGRAM Bilateral 2024    Procedure: Fistulogram;  Surgeon: Irish Castanon MD;  Location: [facility] CATH/EP LAB;  Service: General;  Laterality: Bilateral;    PERICARDIAL WINDOW       "TOTAL ABDOMINAL HYSTERECTOMY  01/16/1998    Done after C/S. Ovaries conserved. Due to "cysts on the uterus"     Family History   Problem Relation Name Age of Onset    Heart disease Mother      Diabetes Mother      COPD Mother      Lung cancer Mother  85        lung CA    Heart disease Father      Stroke Father          cerebral aneurysm    Early death Father      Diabetes Sister      COPD Sister      Lung cancer Sister          lung CA    Depression Daughter      Colon cancer Maternal Aunt          colon CA     Social History     Tobacco Use    Smoking status: Never    Smokeless tobacco: Never   Substance Use Topics    Alcohol use: Never    Drug use: Never     Review of Systems   Constitutional:  Positive for diaphoresis. Negative for chills and fever.   HENT:  Negative for congestion.    Respiratory:  Positive for shortness of breath. Negative for cough.    Cardiovascular:  Positive for chest pain.   Gastrointestinal:  Positive for nausea. Negative for abdominal pain and vomiting.   Genitourinary:  Negative for dysuria.   Musculoskeletal:  Negative for gait problem.   Skin:  Negative for color change.   Neurological:  Negative for dizziness and numbness.   Psychiatric/Behavioral:  Negative for agitation.        Physical Exam     Initial Vitals [07/26/24 1044]   BP Pulse Resp Temp SpO2   (!) 154/70 91 20 99.5 °F (37.5 °C) (!) 94 %      MAP       --         Physical Exam    Nursing note and vitals reviewed.  Constitutional: She appears well-developed and well-nourished.   HENT:   Head: Atraumatic.   Eyes: EOM are normal. Pupils are equal, round, and reactive to light.   Neck:   Normal range of motion.  Cardiovascular:  Normal rate and regular rhythm.           Pulmonary/Chest: Breath sounds normal.   Abdominal: Abdomen is soft. Bowel sounds are normal. She exhibits no distension. There is no abdominal tenderness. There is no rebound and no guarding.   Musculoskeletal:         General: Normal range of motion.      " Right shoulder: Normal.      Left shoulder: Normal.      Cervical back: Normal range of motion.      Comments: Dialysis graft noted in the left upper arm.  Good thrill and bruit noted.     Neurological: She is alert and oriented to person, place, and time.   Skin: Skin is warm and dry.   Psychiatric: She has a normal mood and affect.         ED Course   Procedures  Labs Reviewed   MAGNESIUM - Abnormal       Result Value    Magnesium 1.5 (*)    CBC W/ AUTO DIFFERENTIAL - Abnormal    WBC 11.70      RBC 3.92 (*)     Hemoglobin 11.4 (*)     Hematocrit 35.6 (*)     MCV 91      MCH 29.1      MCHC 32.0      RDW 14.2      Platelets 264      MPV 11.5      Immature Granulocytes 0.6 (*)     Gran # (ANC) 7.9 (*)     Immature Grans (Abs) 0.07 (*)     Lymph # 2.6      Mono # 0.9      Eos # 0.2      Baso # 0.05      nRBC 0      Gran % 67.1      Lymph % 22.1      Mono % 7.7      Eosinophil % 2.1      Basophil % 0.4      Differential Method Automated     COMPREHENSIVE METABOLIC PANEL - Abnormal    Sodium 139      Potassium 4.1      Chloride 93 (*)     CO2 28      Glucose 113 (*)     BUN 34 (*)     Creatinine 7.0 (*)     Calcium 9.1      Total Protein 7.5      Albumin 3.9      Total Bilirubin 0.3      Alkaline Phosphatase 67      AST 11      ALT 9 (*)     eGFR 6.0 (*)     Anion Gap 18 (*)    TROPONIN I HIGH SENSITIVITY - Abnormal    Troponin I High Sensitivity 24.2 (*)    TROPONIN I HIGH SENSITIVITY - Abnormal    Troponin I High Sensitivity 21.5 (*)    TROPONIN I HIGH SENSITIVITY - Abnormal    Troponin I High Sensitivity 23.5 (*)    B-TYPE NATRIURETIC PEPTIDE    BNP 44       EKG Readings: (Independently Interpreted)   Initial Reading: No STEMI. Rhythm: Normal Sinus Rhythm. Heart Rate: 90. Ectopy: No Ectopy. Conduction: Normal. ST Segments: Normal ST Segments. T Waves: Normal. Other Findings: Prolonged QT Interval. Clinical Impression: Normal Sinus Rhythm     ECG Results              Repeat EKG 12-lead (In process)        Collection  Time Result Time QRS Duration OHS QTC Calculation    07/26/24 13:46:16 07/26/24 16:13:48 76 521                     In process by Interface, Lab In [facility] (07/26/24 16:13:55)                   Narrative:    Test Reason : R07.9,    Vent. Rate : 086 BPM     Atrial Rate : 086 BPM     P-R Int : 140 ms          QRS Dur : 076 ms      QT Int : 436 ms       P-R-T Axes : 052 031 000 degrees     QTc Int : 521 ms    Normal sinus rhythm with sinus arrhythmia  Possible Anterior infarct ,age undetermined  Prolonged QT  Abnormal ECG  When compared with ECG of 26-JUL-2024 10:41,  Premature atrial complexes are no longer Present  Nonspecific T wave abnormality now evident in Anterior leads    Referred By: [email or web address]   SELF           Confirmed By:                       In process by Interface, Lab In [facility] (07/26/24 13:57:21)                   Narrative:    Test Reason : R07.9,    Vent. Rate : 086 BPM     Atrial Rate : 086 BPM     P-R Int : 140 ms          QRS Dur : 076 ms      QT Int : 436 ms       P-R-T Axes : 052 031 000 degrees     QTc Int : 521 ms    Normal sinus rhythm with sinus arrhythmia  Possible Anterior infarct ,age undetermined  Prolonged QT  Abnormal ECG  When compared with ECG of 26-JUL-2024 10:41,  Premature atrial complexes are no longer Present  Nonspecific T wave abnormality now evident in Anterior leads    Referred By: [email or web address]   SELF           Confirmed By:                                      EKG 12-lead (In process)        Collection Time Result Time QRS Duration OHS QTC Calculation    07/26/24 10:41:24 07/26/24 12:19:38 74 506                     In process by Interface, Lab In [facility] (07/26/24 12:19:47)                   Narrative:    Test Reason : R07.9,    Vent. Rate : 090 BPM     Atrial Rate : 090 BPM     P-R Int : 134 ms          QRS Dur : 074 ms      QT Int : 414 ms       P-R-T Axes : 060 047 015 degrees     QTc Int : 506 ms    Sinus rhythm with Premature atrial complexes in a pattern of  bigeminy  Low voltage QRS  Prolonged QT  Abnormal ECG  When compared with ECG of 20-FEB-2024 10:52,  Premature atrial complexes are now Present  Questionable change in The axis    Referred By:             Confirmed By:                                   Imaging Results              X-Ray Chest AP Portable (Final result)  Result time 07/26/24 13:50:15      Final result by Lico Troy MD (07/26/24 13:50:15)                   Impression:      No acute cardiac or pulmonary process.      Electronically signed by: Lico Troy  Date:    07/26/2024  Time:    13:50               Narrative:    CLINICAL HISTORY:  ([id number])65 y/o  (1960) F    Chest Pain;    TECHNIQUE:  (A#[id number], exam time 7/26/2024 13:19)    XR CHEST AP PORTABLE [id number]    COMPARISON:  Radiograph from 03/10/2023    FINDINGS:  The lungs are clear. Minimal likely atelectasis is seen at the costophrenic sulci, noting trace pleural fluid is a consideration.  No pneumothorax is identified. The heart is mildly enlarged. Atheromatous calcifications are seen at the aortic arch. Osseous structures appear unchanged. The visualized upper abdomen is unremarkable.                                       Medications - No data to display  Medical Decision Making  64-year-old female presented for an episode of chest pain and shortness of breath.    Initial differential diagnosis included but not limited to acute MI, hypotensive episode, and cardiac arrhythmia.    Risk  Risk Details: The patient was emergently evaluated in the emergency department, her evaluation was significant for an elderly female with a normal lung exam.  The patient's chest x-ray showed no acute abnormalities per Radiology.  The patient's EKG showed no acute abnormalities per my independent interpretation.  The patient's labs were significant for her chronic end-stage renal disease as well as multiple serial mildly elevated troponins that are not rising.  They are likely elevated  secondary to her chronic kidney disease.  The etiology of her symptoms I believe is likely a hypotensive episode.  The patient is not orthostatic here in the emergency department and does not require fluids at this time.  The patient reports that her blood pressure has been running low lately.  Additionally she did not take her home midodrine today as well.  The patient will keep an eye on her blood pressure at home and she is stable for discharge home at this time.  She does not require further care or workup at this time.  She will follow up with her PCP for further care of an outpatient.                                      Clinical Impression:  Final diagnoses:  [R07.9] Chest pain  [R07.9] Chest pain, unspecified type (Primary)  [I95.9] Hypotension, unspecified hypotension type          ED Disposition Condition    Discharge Stable          ED Prescriptions    None       Follow-up Information       Follow up With Specialties Details Why Contact Info    Mohini Majano MD Internal Medicine Schedule an appointment as soon as possible for a visit   [address]  [facility] [address]  [phone]               Robbie Camilo MD  07/26/24 0014

## 2024-07-29 ENCOUNTER — TELEPHONE (OUTPATIENT)
Dept: PRIMARY CARE CLINIC | Facility: CLINIC | Age: 64
End: 2024-07-29
Payer: MEDICARE

## 2024-07-29 NOTE — TELEPHONE ENCOUNTER
I called patient to schedule a hospital follow up. She declined the appt I offered with Dr. Majano on Friday 8/2/24. I offered 8/6/24 at 3:00 with Sarah. Patient will call back to confirm whether she can come to that one.

## 2024-07-30 NOTE — TELEPHONE ENCOUNTER
Patient's daughter called back, spoke with an , and scheduled her mother on 8/20/24. I spoke with the pt who will have her daughter call and speak with me to see if we can schedule her sooner for an ER follow up.

## 2024-07-31 LAB
OHS QRS DURATION: 74 MS
OHS QRS DURATION: 76 MS
OHS QTC CALCULATION: 506 MS
OHS QTC CALCULATION: 521 MS

## 2024-07-31 NOTE — TELEPHONE ENCOUNTER
I spoke with patient's daughter and scheduled the patient for hospital follow up on 8/6/24 at 3:00 with KARIN Beasley

## 2024-08-13 DIAGNOSIS — E11.22 TYPE 2 DIABETES MELLITUS WITH CHRONIC KIDNEY DISEASE ON CHRONIC DIALYSIS, WITHOUT LONG-TERM CURRENT USE OF INSULIN: Primary | ICD-10-CM

## 2024-08-13 DIAGNOSIS — Z99.2 TYPE 2 DIABETES MELLITUS WITH CHRONIC KIDNEY DISEASE ON CHRONIC DIALYSIS, WITHOUT LONG-TERM CURRENT USE OF INSULIN: Primary | ICD-10-CM

## 2024-08-13 DIAGNOSIS — N18.6 TYPE 2 DIABETES MELLITUS WITH CHRONIC KIDNEY DISEASE ON CHRONIC DIALYSIS, WITHOUT LONG-TERM CURRENT USE OF INSULIN: Primary | ICD-10-CM

## 2024-08-13 RX ORDER — SEMAGLUTIDE 0.68 MG/ML
0.25 INJECTION, SOLUTION SUBCUTANEOUS
Qty: 5 EACH | Refills: 3 | Status: SHIPPED | OUTPATIENT
Start: 2024-08-13

## 2024-08-16 ENCOUNTER — TELEPHONE (OUTPATIENT)
Dept: PULMONOLOGY | Facility: CLINIC | Age: 64
End: 2024-08-16
Payer: MEDICARE

## 2024-08-26 ENCOUNTER — TELEPHONE (OUTPATIENT)
Dept: PRIMARY CARE CLINIC | Facility: CLINIC | Age: 64
End: 2024-08-26
Payer: MEDICARE

## 2024-08-26 NOTE — TELEPHONE ENCOUNTER
----- Message from Cady Restrepo sent at 8/26/2024  8:33 AM CDT -----  Regarding: pt advice - pharmacy program  [phone] - call back ; daughter More Lopes - having questions about her moms medication . Her mom was accepted at the pharmacy program.      Cady

## 2024-08-26 NOTE — TELEPHONE ENCOUNTER
Patient's daughter states they got a letter telling them to  the patient's medication from her physician. We have not received any medication at [facility] for this patient. I emailed the patient assistance program.

## 2024-08-29 NOTE — TELEPHONE ENCOUNTER
Patient's daughter, Susanne, states the patient assistance program contacted them yesterday and that they are mailing the medication to the patient.

## 2024-09-03 ENCOUNTER — OFFICE VISIT (OUTPATIENT)
Dept: PRIMARY CARE CLINIC | Facility: CLINIC | Age: 64
End: 2024-09-03
Payer: MEDICARE

## 2024-09-03 VITALS
DIASTOLIC BLOOD PRESSURE: 74 MMHG | HEIGHT: 66 IN | HEART RATE: 82 BPM | OXYGEN SATURATION: 97 % | BODY MASS INDEX: 46.91 KG/M2 | WEIGHT: 291.88 LBS | SYSTOLIC BLOOD PRESSURE: 122 MMHG

## 2024-09-03 DIAGNOSIS — I10 PRIMARY HYPERTENSION: ICD-10-CM

## 2024-09-03 DIAGNOSIS — K21.9 GASTROESOPHAGEAL REFLUX DISEASE, UNSPECIFIED WHETHER ESOPHAGITIS PRESENT: ICD-10-CM

## 2024-09-03 DIAGNOSIS — N18.6 TYPE 2 DIABETES MELLITUS WITH CHRONIC KIDNEY DISEASE ON CHRONIC DIALYSIS, WITHOUT LONG-TERM CURRENT USE OF INSULIN: Primary | ICD-10-CM

## 2024-09-03 DIAGNOSIS — Z99.2 HEMODIALYSIS PATIENT: ICD-10-CM

## 2024-09-03 DIAGNOSIS — E66.01 MORBID OBESITY: ICD-10-CM

## 2024-09-03 DIAGNOSIS — Z99.2 TYPE 2 DIABETES MELLITUS WITH CHRONIC KIDNEY DISEASE ON CHRONIC DIALYSIS, WITHOUT LONG-TERM CURRENT USE OF INSULIN: Primary | ICD-10-CM

## 2024-09-03 DIAGNOSIS — E66.01 MORBID OBESITY WITH BODY MASS INDEX (BMI) OF 40.0 TO 49.9: ICD-10-CM

## 2024-09-03 DIAGNOSIS — E11.22 TYPE 2 DIABETES MELLITUS WITH CHRONIC KIDNEY DISEASE ON CHRONIC DIALYSIS, WITHOUT LONG-TERM CURRENT USE OF INSULIN: Primary | ICD-10-CM

## 2024-09-03 PROCEDURE — 99214 OFFICE O/P EST MOD 30 MIN: CPT | Mod: S$PBB,,, | Performed by: INTERNAL MEDICINE

## 2024-09-03 PROCEDURE — 99999 PR PBB SHADOW E&M-EST. PATIENT-LVL IV: CPT | Mod: PBBFAC,,, | Performed by: INTERNAL MEDICINE

## 2024-09-03 PROCEDURE — 99214 OFFICE O/P EST MOD 30 MIN: CPT | Mod: PBBFAC,PN | Performed by: INTERNAL MEDICINE

## 2024-09-03 RX ORDER — ONDANSETRON 4 MG/1
4 TABLET, FILM COATED ORAL EVERY 6 HOURS PRN
Qty: 90 TABLET | Refills: 1
Start: 2024-09-03

## 2024-09-03 RX ORDER — MIDODRINE HYDROCHLORIDE 5 MG/1
5 TABLET ORAL EVERY OTHER DAY
COMMUNITY
Start: 2024-07-26

## 2024-09-03 NOTE — PROGRESS NOTES
INTERNAL MEDICINE PROGRESS/URGENT CARE NOTE    CHIEF COMPLAINT     Chief Complaint   Patient presents with    Follow-up     Patient presents for her 5 month follow up appointment. Patient has no complaints at this time.       HPI     Marian Dumont is a 63 y.o.  female who presents with a PMHx of  EDRD on HD, DM2, HLD, HTN, anemia, GERD, Morbid obesity, who presents today for routine follow up visit.     Her main complaints and concerns today are:   10-pound wieght gain. Says due to lack of activity sits all day reading per patient.we discussed scheduling her exercise  Worsening diabetes control. Unable to get her medications as the pharmacy assistance program has just heplped with her medication.her hgb A1c (done a t HD is now 7.3 up from 6.9. she will now start the ozempic. Concerns as she's on HD.     At her previous visits, we have discussed:   We wanted her to lose a great deal fo wieght to qualify to be on the transplant list, but then came the issues of CHF. Previous weight was 308 and on last visit was 271.      Discussed her HD and why shes not on a transplant list. Says they told her she has on too much weight. Will refer her for second opinion and we had a veryvery long discussion re weight loss: needs to go for walks daily, join weight watchers etc.      ESRD on HD: has dialysis on days M/W/F.      DM2: on ongyza. Last hgb a1c was 7.4 and not at goal.   Foot exm was done today  Last eyee exam was scheduled for today after this appointment      January 2019 had monty holes with drain of brain bleed      Perdicardial effusion: pericardial window with abelardo.       Home Medications:  Prior to Admission medications    Medication Sig Start Date End Date Taking? Authorizing Provider   amLODIPine (NORVASC) 5 MG tablet Take 1 tablet (5 mg total) by mouth once daily. 3/5/24   Mohini Majano MD   aspirin (ECOTRIN) 81 MG EC tablet Take 81 mg by mouth once daily.    Provider, Historical   calcitRIOL  (ROCALTROL) 0.25 MCG Cap Take 0.25 mcg by mouth once daily.    Provider, Historical   carvediloL (COREG) 12.5 MG tablet TAKE 1 TABLET(12.5 MG) BY MOUTH TWICE DAILY WITH MEALS 6/7/24   Mohini Majano MD   cinacalcet (SENSIPAR) 30 MG Tab Take 30 mg by mouth Daily. 1/18/23   Provider, Historical   LIDOcaine-prilocaine (EMLA) cream Apply 1 each topically as needed. 1/26/23   Provider, Historical   omeprazole (PRILOSEC) 40 MG capsule Take 1 capsule (40 mg total) by mouth once daily. 8/10/23   Eugenie Paz NP   ondansetron (ZOFRAN) 4 MG tablet Take 1 tablet (4 mg total) by mouth every 6 (six) hours as needed for Nausea. 3/12/23   Sean العلي MD   rosuvastatin (CRESTOR) 10 MG tablet Take 1 tablet (10 mg total) by mouth once daily. 7/18/23   Mohini Majano MD   SAXagliptin (ONGLYZA) 2.5 mg Tab tablet Take 1 tablet (2.5 mg total) by mouth once daily. 9/5/23 9/4/24  Mohini Majano MD   semaglutide (OZEMPIC) 0.25 mg or 0.5 mg (2 mg/3 mL) pen injector Inject 0.5 mg into the skin every 7 days. 4/3/24   Mohini Majano MD   semaglutide (OZEMPIC) 0.25 mg or 0.5 mg (2 mg/3 mL) pen injector Inject 0.25 mg into the skin every 7 days. 8/13/24   Mohini Majano MD       Review of Systems:  Review of Systems        Advance Care Planning     Date: 09/03/2024    Power of   I initiated the process of voluntary advance care planning today and explained the importance of this process to the patient.  I introduced the concept of advance directives to the patient, as well. Then the patient received detailed information about the importance of designating a Health Care Power of  (HCPOA). She was also instructed to communicate with this person about their wishes for future healthcare, should she become sick and lose decision-making capacity. The patient has previously appointed a HCPOA. After our discussion, the patient has decided to complete a HCPOA and has appointed her daughter,  "health care agent:  on file   & health care agent number:  on file . I encouraged her to communicate with this person about their wishes for future healthcare, should she become sick and lose decision-making capacity.      A total of 10 min was spent on advance care planning, goals of care discussion, emotional support, formulating and communicating prognosis and exploring burden/benefit of various approaches of treatment. This discussion occurred on a fully voluntary basis with the verbal consent of the patient and/or family.           PHYSICAL EXAM     /74 (BP Location: Right forearm, Patient Position: Sitting, BP Method: Medium (Manual))   Pulse 82   Ht 5' 6" (1.676 m)   Wt 132.4 kg (291 lb 14.2 oz)   SpO2 97%   BMI 47.11 kg/m²     GEN - A+OX4, NAD   HEENT - PERRL, EOMI, OP clear  Neck - No thyromegaly or cervical LAD. No thyroid masses felt.  CV - RRR, no m/r   Chest - CTAB, no wheezing or rhonchi  Abd - S/NT/ND/+BS.   Ext - 2+BDP and radial pulses. No C/C/E.  Skin - No rash.  Protective Sensation (w/ 10 gram monofilament):  Right: Intact  Left: Intact    Visual Inspection:  Normal -  Bilateral    Pedal Pulses:   Right: Present  Left: Present    Posterior Tibialis Pulses:   Right:Present  Left: Present    LABS         ASSESSMENT/PLAN     Marian Dumont is a 64 y.o. female with  1. Type 2 diabetes mellitus with chronic kidney disease on chronic dialysis, without long-term current use of insulin  Worsening control due to issues getting medications. Now up to 7.3 (not on our system as its checked at HD)  Overview:  Not at goal. Discussed lowering carb intake or will add medication in 3 months       2. Morbid obesity with body mass index (BMI) of 40.0 to 49.9  Encouraged wieght loss through diet and excecrise    3. Primary hypertension  Overview:  BP well controlled today  Continue with current regimen       4. Hemodialysis patient  Stable   Continue per nephrology    5. Morbid obesity  Overview:  Long " discussion re weight loss      6. Gastroesophageal reflux disease, unspecified whether esophagitis present  Overview:  Well controlled.   Discussed dietary change      Other orders  -     ondansetron (ZOFRAN) 4 MG tablet; Take 1 tablet (4 mg total) by mouth every 6 (six) hours as needed for Nausea (PRN [employer]).  Dispense: 90 tablet; Refill: 1           WORRY SCORE 2    RTC in 3 months, sooner if needed and depending on labs.    Mohini Majano MD  Board Certified Internist/Geriatrician  [facility]

## 2024-09-05 DIAGNOSIS — I10 PRIMARY HYPERTENSION: Primary | ICD-10-CM

## 2024-09-05 RX ORDER — CARVEDILOL 12.5 MG/1
12.5 TABLET ORAL
Qty: 90 TABLET | Refills: 1 | Status: SHIPPED | OUTPATIENT
Start: 2024-09-05

## 2024-10-09 RX ORDER — ROSUVASTATIN CALCIUM 10 MG/1
10 TABLET, COATED ORAL
Qty: 90 TABLET | Refills: 3 | Status: SHIPPED | OUTPATIENT
Start: 2024-10-09

## 2024-10-15 ENCOUNTER — HOSPITAL ENCOUNTER (EMERGENCY)
Facility: HOSPITAL | Age: 64
Discharge: HOME | End: 2024-10-15
Attending: EMERGENCY MEDICINE
Payer: MEDICARE

## 2024-10-15 VITALS
BODY MASS INDEX: 46.47 KG/M2 | WEIGHT: 287.88 LBS | TEMPERATURE: 98 F | HEART RATE: 100 BPM | OXYGEN SATURATION: 98 % | RESPIRATION RATE: 20 BRPM | SYSTOLIC BLOOD PRESSURE: 168 MMHG | DIASTOLIC BLOOD PRESSURE: 86 MMHG

## 2024-10-15 DIAGNOSIS — L02.91 ABSCESS: Primary | ICD-10-CM

## 2024-10-15 PROCEDURE — 69000 DRG XTRNL EAR ABSC/HEM SMPL: CPT | Mod: RT

## 2024-10-15 PROCEDURE — 99283 EMERGENCY DEPT VISIT LOW MDM: CPT | Mod: 25

## 2024-10-15 PROCEDURE — 63600175 PHARM REV CODE 636 W HCPCS: Performed by: NURSE PRACTITIONER

## 2024-10-15 PROCEDURE — 25000003 PHARM REV CODE 250: Performed by: NURSE PRACTITIONER

## 2024-10-15 RX ORDER — OXYCODONE AND ACETAMINOPHEN 5; 325 MG/1; MG/1
1 TABLET ORAL
Status: COMPLETED | OUTPATIENT
Start: 2024-10-15 | End: 2024-10-15

## 2024-10-15 RX ORDER — LIDOCAINE HYDROCHLORIDE 10 MG/ML
10 INJECTION, SOLUTION EPIDURAL; INFILTRATION; INTRACAUDAL; PERINEURAL
Status: COMPLETED | OUTPATIENT
Start: 2024-10-15 | End: 2024-10-15

## 2024-10-15 RX ORDER — CLINDAMYCIN HYDROCHLORIDE 300 MG/1
300 CAPSULE ORAL EVERY 6 HOURS
Qty: 40 CAPSULE | Refills: 0 | Status: SHIPPED | OUTPATIENT
Start: 2024-10-15 | End: 2024-10-25

## 2024-10-15 RX ADMIN — OXYCODONE HYDROCHLORIDE AND ACETAMINOPHEN 1 TABLET: 5; 325 TABLET ORAL at 06:10

## 2024-10-15 RX ADMIN — LIDOCAINE HYDROCHLORIDE 100 MG: 10 SOLUTION INTRAVENOUS at 06:10

## 2024-10-15 NOTE — ED PROVIDER NOTES
"Encounter Date: 10/15/2024       History     Chief Complaint   Patient presents with    Facial Swelling     Reports ear pain starting 2 weeks ago and saw ENT. States then started swelling and pain is worsening. Swelling noted above left ear.      Presents with a plain of pain to the left outer ear.  Patient has swelling to the posterior region of the Pinna.  States she has seen her ENT 2-3 weeks ago for internal ear pain which she reports as "deep down" she states that he told her the ear was fine.  At that time she did not have swelling.  She denies fever nausea vomiting or diarrhea.  He she reports the swelling started about a week ago.      Review of patient's allergies indicates:   Allergen Reactions    Ace inhibitors Anaphylaxis, Swelling and Other (See Comments)     Other reaction(s): Angioedema     Past Medical History:   Diagnosis Date    Anemia     Coronary artery disease     COVID-19     Diabetes mellitus     Diabetes mellitus, type 2     Dialysis patient     Disorder of kidney and ureter     GERD (gastroesophageal reflux disease)     Hypertension     Mixed hyperlipidemia     Obese body habitus     Sleep apnea      Past Surgical History:   Procedure Laterality Date    BRAIN SURGERY       SECTION  1998    CHOLECYSTECTOMY      COLONOSCOPY N/A 10/12/2023    Procedure: COLONOSCOPY;  Surgeon: Keren Villalobos MD;  Location: [facility] ENDO;  Service: Endoscopy;  Laterality: N/A;    FISTULOGRAM Bilateral 2024    Procedure: Fistulogram;  Surgeon: Irish Castanon MD;  Location: [facility] CATH/EP LAB;  Service: General;  Laterality: Bilateral;    PERICARDIAL WINDOW      TOTAL ABDOMINAL HYSTERECTOMY  1998    Done after C/S. Ovaries conserved. Due to "cysts on the uterus"     Family History   Problem Relation Name Age of Onset    Heart disease Mother      Diabetes Mother      COPD Mother      Lung cancer Mother  85        lung CA    Heart disease Father      Stroke Father          cerebral " aneurysm    Early death Father      Diabetes Sister      COPD Sister      Lung cancer Sister          lung CA    Depression Daughter      Colon cancer Maternal Aunt          colon CA     Social History     Tobacco Use    Smoking status: Never    Smokeless tobacco: Never   Substance Use Topics    Alcohol use: Never    Drug use: Never     Review of Systems   Constitutional:  Negative for fever.   HENT:  Negative for congestion, ear discharge, ear pain, sinus pressure, sore throat and trouble swallowing.         Left posterior ear swelling.   Respiratory:  Negative for cough, shortness of breath and wheezing.    Cardiovascular:  Negative for chest pain, palpitations and leg swelling.   Gastrointestinal:  Negative for abdominal pain, diarrhea, nausea and vomiting.   Musculoskeletal:  Negative for back pain and gait problem.   Skin:  Negative for rash.   Neurological:  Negative for weakness.       Physical Exam     Initial Vitals [10/15/24 1739]   BP Pulse Resp Temp SpO2   (!) 187/101 107 18 98 °F (36.7 °C) 96 %      MAP       --         Physical Exam    Constitutional: She appears well-developed and well-nourished.   HENT:   Head: Normocephalic. Mouth/Throat: Oropharynx is clear and moist.   There is abscess posterior to the pinna left ear.  It is fluctuant.  There are no signs of cellulitis.   Eyes: Conjunctivae are normal.   Neck: Neck supple.   Normal range of motion.  Cardiovascular:  Normal rate and regular rhythm.           Pulmonary/Chest: Breath sounds normal. No respiratory distress.   Musculoskeletal:         General: Normal range of motion.      Cervical back: Normal range of motion and neck supple.     Neurological: She is alert and oriented to person, place, and time. No sensory deficit. GCS score is 15. GCS eye subscore is 4. GCS verbal subscore is 5. GCS motor subscore is 6.   Skin: Skin is warm and dry. Capillary refill takes less than 2 seconds.   Psychiatric: She has a normal mood and affect. Thought  "content normal.         ED Course   I & D - Incision and Drainage    Date/Time: 10/15/2024 6:38 PM  Location procedure was performed: [facility] EMERGENCY DEPARTMENT    Performed by: Sarah Smith NP  Authorized by: Mercedes Zuniga MD  Consent Done: Not Needed  Type: abscess  Body area: head/neck  Anesthesia: local infiltration    Anesthesia:  Local Anesthetic: lidocaine 1% without epinephrine  Anesthetic total: 3 mL  Complexity: simple  Drainage: pus  Drainage amount: moderate  Comments: 2 cc appeared at drainage pulled from the abscess via 18 gauge needle.  Patient tolerated well.        Labs Reviewed - No data to display       Imaging Results    None          Medications   LIDOcaine (PF) 10 mg/ml (1%) injection 100 mg (100 mg Infiltration Given 10/15/24 1810)   oxyCODONE-acetaminophen 5-325 mg per tablet 1 tablet (1 tablet Oral Given 10/15/24 1809)     Medical Decision Making  Presents with a abscess to the posterior paronychia.  Patient reports she was seen by her ENT for what she "it as deep ear pain.  She was seen about 2-3 weeks ago.  She did not have swelling at that time.  She reports the swelling has been there for about a week.  Denies fever nausea vomiting or diarrhea.    Risk  Prescription drug management.  Risk Details: Patient tolerated the procedure aspiration well.  She was instructed to leave the Band-Aid on for at least 24 hours.  She was instructed to follow up with the primary care doctor.  I have given her clindamycin 300 mg q.i.d. for the next 10 days.  At no time while in the ED did she ever appear to be in any acute distress.  She was given return precautions.                                      Clinical Impression:  Final diagnoses:  [L02.91] Abscess (Primary)          ED Disposition Condition    Discharge Stable          ED Prescriptions       Medication Sig Dispense Start Date End Date Auth. Provider    clindamycin (CLEOCIN) 300 MG capsule Take 1 capsule (300 mg total) by mouth every 6 " (six) hours. for 10 days 40 capsule 10/15/2024 10/25/2024 Sarah Smith NP          Follow-up Information       Follow up With Specialties Details Why Contact Info    Mohini Majano MD Internal Medicine In 3 days  [address]  [facility] [address]  [phone]               Sarah Smith NP  10/15/24 7529

## 2024-10-15 NOTE — DISCHARGE INSTRUCTIONS
Keep your wound clean and dry.  Follow up with the primary care doctor as directed.  Take your medication as directed return to the ED for any worsening symptoms or any other concerns.

## 2024-10-18 ENCOUNTER — OFFICE VISIT (OUTPATIENT)
Dept: PRIMARY CARE CLINIC | Facility: CLINIC | Age: 64
End: 2024-10-18
Payer: MEDICARE

## 2024-10-18 VITALS
TEMPERATURE: 98 F | SYSTOLIC BLOOD PRESSURE: 144 MMHG | HEART RATE: 67 BPM | BODY MASS INDEX: 45.88 KG/M2 | HEIGHT: 66 IN | DIASTOLIC BLOOD PRESSURE: 80 MMHG | OXYGEN SATURATION: 94 % | WEIGHT: 285.5 LBS

## 2024-10-18 DIAGNOSIS — L02.91 ABSCESS: Primary | ICD-10-CM

## 2024-10-18 PROCEDURE — 99213 OFFICE O/P EST LOW 20 MIN: CPT | Mod: PBBFAC,PN | Performed by: PHYSICIAN ASSISTANT

## 2024-10-18 PROCEDURE — 99999 PR PBB SHADOW E&M-EST. PATIENT-LVL III: CPT | Mod: PBBFAC,,, | Performed by: PHYSICIAN ASSISTANT

## 2024-10-18 NOTE — PROGRESS NOTES
"Subjective:      Patient ID: Marian Dumont is a 64 y.o. female.    Vitals:  height is 5' 6" (1.676 m) and weight is 129.5 kg (285 lb 7.9 oz). Her temperature is 98 °F (36.7 °C). Her blood pressure is 144/80 (abnormal) and her pulse is 67. Her oxygen saturation is 94% (abnormal).     Chief Complaint: Cyst (Patient presents for follow up of cyst behind left ear. Patient stated cyst was drained Tuesday evening, but returned Wednesday. Patient stated cyst burst on its own today before this appointment. )    History of Present Illness    CHIEF COMPLAINT:  Marian presents with recurrent ear pain and a recently drained abscess near a previous ear surgery site.    HPI:  Marian developed ear pain (on scalp above left ear) 1-1.5 months ago. She underwent ear surgery 6 months prior to address a persistent middle ear infection that had not responded to antibiotics and ear drops for nearly 2 years. The surgical procedure involved accessing the area under the jawbone and removing a portion of the skull to eradicate the infection.    When the pain initially manifested, an emergency ENT consultation suggested a possible jaw-related etiology rather than an ear issue. Marian did not pursue dental evaluation at that time. Earlier this week, the ear pain intensified, and a large cyst formed at the site of the previous surgical incision.    Marian sought medical attention, and a healthcare provider observed an abscess, attempting partial drainage. The abscess reportedly recurred with increased size on Tuesday. A few hours before the current visit, the abscess spontaneously ruptured, producing drainage described as chunky with blood-tinged, yellow, purulent material.    Marian was prescribed clindamycin for the current infection.  The affected area is tender.    SURGICAL HISTORY:  She underwent ear surgery approximately 6 months ago in the spring. The indication for the surgery was a persistent middle ear infection that had been " unresponsive to antibiotics or drops for almost 2 years. The procedure involved accessing the area under the jawbone and removing a portion of the skull to eradicate the infection.    TEST RESULTS:  A culture was taken during the patient's ear surgery.  We do not have these results as this was done at an outside facility.  Culture was not done at ER visit.      ROS:  ENT: +ear pain          Objective:     Physical Exam   Constitutional:  Non-toxic appearance. No distress.   HENT:   Ears:   Right Ear: External ear normal.      Comments: Surgical changes noted to left ear.  There is a small opening in scar line above the left ear.  There is dried purulent drainage and some of the hair surrounding this.  They areas extremely tender.  I do not appreciate any fluctuance at this time.  Eyes: Conjunctivae are normal. Right eye exhibits no discharge. Left eye exhibits no discharge. Extraocular movement intact   Pulmonary/Chest: Effort normal. No respiratory distress.   Neurological: She is alert.   Skin: Skin is warm, dry and not diaphoretic.   Nursing note and vitals reviewed.      Assessment:     1. Abscess        Plan:     Assessment & Plan    Assessed ear area abscess, which had recently burst  Determined most of the abscess contents had likely drained when it burst  Considered possibility of recurring infection related to previous ear surgery  Continued current antibiotic treatment (clindamycin) without changes  Opted for conservative management with warm compresses and continued drainage rather than immediate intervention    SKIN INFECTION:  - Explained that keeping the area moist and open will allow continued drainage until the antibiotic takes effect.  - Marian to apply warm compresses to the affected area as often as tolerable over the weekend.  - Continued clindamycin at current dosage.    FOLLOW UP:  - Contact the office on Monday morning if no improvement is seen.  - Provider gave contact information (card) to  patient for follow-up purposes.   - If no improvement may need to contact ENT office to see if they have any information on culture and sensitivity of possible bacteria.       Abscess      This note was generated with the assistance of ambient listening technology. Verbal consent was obtained by the patient and accompanying visitor(s) for the recording of patient appointment to facilitate this note. I attest to having reviewed and edited the generated note for accuracy, though some syntax or spelling errors may persist. Please contact the author of this note for any clarification.     My total time spent on this encounter was 23 minutes which included  the following activities: preparing to see the patient, performing a medically appropriate and/or evaluation, counseling and educating the patient and family/caregiver, ordering medications, tests, or procedures, referring and communicating with other healthcare providers, documenting clinical information in the electronic or other health record, and independently interpreting results. This time is independent and non-overlapping.

## 2024-10-19 ENCOUNTER — PATIENT MESSAGE (OUTPATIENT)
Dept: PRIMARY CARE CLINIC | Facility: CLINIC | Age: 64
End: 2024-10-19
Payer: MEDICARE

## 2024-10-19 DIAGNOSIS — R11.0 NAUSEA: Primary | ICD-10-CM

## 2024-10-21 RX ORDER — ONDANSETRON 4 MG/1
4 TABLET, FILM COATED ORAL EVERY 6 HOURS PRN
Qty: 90 TABLET | Refills: 3 | Status: SHIPPED | OUTPATIENT
Start: 2024-10-21

## 2024-10-21 NOTE — TELEPHONE ENCOUNTER
"LOV: 9/3/20  NOV: 12/3/24  LAST ORDERED: 9/3/24 (*IT DEFAULTED TO "NO PRINT" AND DID NOT GO TO THE PHARMACY)  "

## 2025-01-02 ENCOUNTER — OFFICE VISIT (OUTPATIENT)
Dept: PRIMARY CARE CLINIC | Facility: CLINIC | Age: 65
End: 2025-01-02
Payer: MEDICARE

## 2025-01-02 VITALS
SYSTOLIC BLOOD PRESSURE: 140 MMHG | OXYGEN SATURATION: 98 % | WEIGHT: 288.94 LBS | DIASTOLIC BLOOD PRESSURE: 80 MMHG | HEIGHT: 66 IN | BODY MASS INDEX: 46.44 KG/M2 | TEMPERATURE: 98 F | HEART RATE: 86 BPM

## 2025-01-02 DIAGNOSIS — E66.01 MORBID OBESITY WITH BODY MASS INDEX (BMI) OF 40.0 TO 49.9: ICD-10-CM

## 2025-01-02 DIAGNOSIS — Z23 FLU VACCINE NEED: ICD-10-CM

## 2025-01-02 DIAGNOSIS — E66.01 MORBID OBESITY: ICD-10-CM

## 2025-01-02 DIAGNOSIS — N18.6 ESRD ON HEMODIALYSIS: ICD-10-CM

## 2025-01-02 DIAGNOSIS — Z99.2 TYPE 2 DIABETES MELLITUS WITH CHRONIC KIDNEY DISEASE ON CHRONIC DIALYSIS, WITHOUT LONG-TERM CURRENT USE OF INSULIN: ICD-10-CM

## 2025-01-02 DIAGNOSIS — I10 PRIMARY HYPERTENSION: Primary | ICD-10-CM

## 2025-01-02 DIAGNOSIS — E11.22 TYPE 2 DIABETES MELLITUS WITH CHRONIC KIDNEY DISEASE ON CHRONIC DIALYSIS, WITHOUT LONG-TERM CURRENT USE OF INSULIN: ICD-10-CM

## 2025-01-02 DIAGNOSIS — E78.5 DYSLIPIDEMIA: ICD-10-CM

## 2025-01-02 DIAGNOSIS — Z99.2 ESRD ON HEMODIALYSIS: ICD-10-CM

## 2025-01-02 DIAGNOSIS — I42.2 HYPERTROPHIC CARDIOMYOPATHY: ICD-10-CM

## 2025-01-02 DIAGNOSIS — Q61.2 ADPKD (AUTOSOMAL DOMINANT POLYCYSTIC KIDNEY): ICD-10-CM

## 2025-01-02 DIAGNOSIS — N18.6 TYPE 2 DIABETES MELLITUS WITH CHRONIC KIDNEY DISEASE ON CHRONIC DIALYSIS, WITHOUT LONG-TERM CURRENT USE OF INSULIN: ICD-10-CM

## 2025-01-02 PROCEDURE — 90653 IIV ADJUVANT VACCINE IM: CPT | Mod: PBBFAC,PN

## 2025-01-02 PROCEDURE — 99999PBSHW PR PBB SHADOW TECHNICAL ONLY FILED TO HB: Mod: PBBFAC,,,

## 2025-01-02 PROCEDURE — 99999 PR PBB SHADOW E&M-EST. PATIENT-LVL III: CPT | Mod: PBBFAC,,, | Performed by: INTERNAL MEDICINE

## 2025-01-02 PROCEDURE — 99213 OFFICE O/P EST LOW 20 MIN: CPT | Mod: PBBFAC,PN | Performed by: INTERNAL MEDICINE

## 2025-01-02 PROCEDURE — G0008 ADMIN INFLUENZA VIRUS VAC: HCPCS | Mod: PBBFAC,PN

## 2025-01-02 RX ORDER — SEMAGLUTIDE 0.68 MG/ML
0.25 INJECTION, SOLUTION SUBCUTANEOUS
Qty: 5 EACH | Refills: 3 | Status: SHIPPED | OUTPATIENT
Start: 2025-01-02 | End: 2025-01-02

## 2025-01-02 RX ORDER — SEMAGLUTIDE 1.34 MG/ML
1 INJECTION, SOLUTION SUBCUTANEOUS
Qty: 3 ML | Refills: 2 | Status: SHIPPED | OUTPATIENT
Start: 2025-01-02 | End: 2025-01-02

## 2025-01-02 RX ADMIN — INFLUENZA A VIRUS A/VICTORIA/4897/2022 IVR-238 (H1N1) ANTIGEN (FORMALDEHYDE INACTIVATED), INFLUENZA A VIRUS A/THAILAND/8/2022 IVR-237 (H3N2) ANTIGEN (FORMALDEHYDE INACTIVATED), INFLUENZA B VIRUS B/AUSTRIA/1359417/2021 BVR-26 ANTIGEN (FORMALDEHYDE INACTIVATED) 0.5 ML: 15; 15; 15 INJECTION, SUSPENSION INTRAMUSCULAR at 10:01

## 2025-01-02 NOTE — PROGRESS NOTES
INTERNAL MEDICINE PROGRESS/URGENT CARE NOTE    CHIEF COMPLAINT     No chief complaint on file.      HPI     Marian Dumont is a 63 y.o.  female who presents with a PMHx of  EDRD on HD, DM2, HLD, HTN, anemia, GERD, Morbid obesity, who presents today for routine follow up visit.      Her main complaints and concerns today are: doing very well   Wishes to be off the ozempic. Says she has injection site tenderness. Also not losing any weight was previously on onglyza which did work but had trouble getting it approved. If sugars increase off the ozempic will retry this one.      At her previous visits, we have discussed:   We wanted her to lose a great deal fo wieght to qualify to be on the transplant list, but then came the issues of CHF. Previous weight was 308 and on last visit was 271.   10-pound wieght gain. Says due to lack of activity sits all day reading per patient.we discussed scheduling her exercise  Worsening diabetes control. Unable to get her medications as the pharmacy assistance program has just heplped with her medication.her hgb A1c (done a t HD is now 7.3 up from 6.9. she will now start the ozempic. Concerns as she's on HD.      ESRD on HD: has dialysis on days M/W/F.      DM2: on ongyza. Last hgb a1c was 7.4 and not at goal.   Foot exm was done today  Last eyee exam was scheduled for today after this appointment      January 2019 had monty holes with drain of brain bleed      Perdicardial effusion: pericardial window with abelardo.    Home Medications:  Prior to Admission medications    Medication Sig Start Date End Date Taking? Authorizing Provider   amLODIPine (NORVASC) 5 MG tablet Take 1 tablet (5 mg total) by mouth once daily. 3/5/24   Mohini Majano MD   aspirin (ECOTRIN) 81 MG EC tablet Take 81 mg by mouth once daily.    Provider, Historical   calcitRIOL (ROCALTROL) 0.25 MCG Cap Take 0.25 mcg by mouth once daily.    Provider, Historical   carvediloL (COREG) 12.5 MG tablet TAKE 1 TABLET(12.5  MG) BY MOUTH TWICE DAILY WITH MEALS 9/5/24   Mohini Majano MD   cinacalcet (SENSIPAR) 30 MG Tab Take 30 mg by mouth Daily. 1/18/23   Provider, Historical   LIDOcaine-prilocaine (EMLA) cream Apply 1 each topically as needed. 1/26/23   Provider, Historical   midodrine (PROAMATINE) 5 MG Tab Take 5 mg by mouth every other day. Monday, Wednesday and Friday 7/26/24   Provider, Historical   omeprazole (PRILOSEC) 40 MG capsule Take 1 capsule (40 mg total) by mouth once daily. 8/10/23   Eugenie Paz, NP   ondansetron (ZOFRAN) 4 MG tablet Take 1 tablet (4 mg total) by mouth every 6 (six) hours as needed for Nausea (PRN Dialysys). 10/21/24   Mohini Majano MD   rosuvastatin (CRESTOR) 10 MG tablet TAKE 1 TABLET(10 MG) BY MOUTH EVERY DAY 10/9/24   Zachary Donald MD   semaglutide (OZEMPIC) 0.25 mg or 0.5 mg (2 mg/3 mL) pen injector Inject 0.25 mg into the skin every 7 days. 8/13/24   Mohini Majano MD       Review of Systems:  Review of Systems    Advance Care Planning     Date: 01/02/2025    Power of   I initiated the process of voluntary advance care planning today and explained the importance of this process to the patient.  I introduced the concept of advance directives to the patient, as well. Then the patient received detailed information about the importance of designating a Health Care Power of  (HCPOA). She was also instructed to communicate with this person about their wishes for future healthcare, should she become sick and lose decision-making capacity. The patient has previously appointed a HCPOA. After our discussion, the patient has decided to complete a HCPOA and has appointed her daughter, health care agent:  on file  & health care agent number:  on file . I encouraged her to communicate with this person about their wishes for future healthcare, should she become sick and lose decision-making capacity.      A total of 10 min was spent on advance care planning,  goals of care discussion, emotional support, formulating and communicating prognosis and exploring burden/benefit of various approaches of treatment. This discussion occurred on a fully voluntary basis with the verbal consent of the patient and/or family.             PHYSICAL EXAM     There were no vitals taken for this visit.    GEN - A+OX4, NAD   HEENT - PERRL, EOMI, OP clear  Neck - No thyromegaly or cervical LAD. No thyroid masses felt.  CV - RRR, no m/r   Chest - CTAB, no wheezing or rhonchi  Abd - S/NT/ND/+BS.   Ext - 2+BDP and radial pulses. No C/C/E.  Skin - No rash.    LABS       ASSESSMENT/PLAN     Marian Dumont is a 64 y.o. female with  1. Primary hypertension  Overview:  BP well controlled today  Continue with current regimen       2. Type 2 diabetes mellitus with chronic kidney disease on chronic dialysis, without long-term current use of insulin  Wantst   Overview:  Not at goal. Discussed lowering carb intake or will add medication in 3 months       3. Morbid obesity  Overview:  Long discussion re weight loss      4. Hypertrophic cardiomyopathy  Asymptomatic   Continue current medications    5. Morbid obesity with body mass index (BMI) of 40.0 to 49.9  Again discussed wieght loss through diet and exercise     6. ESRD on hemodialysis  continued    7. ADPKD (autosomal dominant polycystic kidney)  Overview:  On HD              WORRY SCORE 2    RTC in 3 months, sooner if needed and depending on labs.    Mohini Majano MD  Board Certified Internist/Geriatrician  [facility] ([place]

## 2025-03-14 NOTE — TELEPHONE ENCOUNTER
Refill Routing Note   Medication(s) are not appropriate for processing by [facility] for the following reason(s):        Non-participating provider    [facility] action(s):  Route             Appointments  past 12m or future 3m with PCP    Date Provider   Last Visit   8/10/2023 Eugenie Paz NP   Next Visit   Visit date not found Eugenie Paz NP   ED visits in past 90 days: 0        Note composed:4:31 PM 03/14/2025

## 2025-03-17 RX ORDER — OMEPRAZOLE 40 MG/1
40 CAPSULE, DELAYED RELEASE ORAL
Qty: 90 CAPSULE | Refills: 3 | Status: SHIPPED | OUTPATIENT
Start: 2025-03-17

## 2025-03-26 ENCOUNTER — TELEPHONE (OUTPATIENT)
Dept: PRIMARY CARE CLINIC | Facility: CLINIC | Age: 65
End: 2025-03-26
Payer: MEDICARE

## 2025-03-26 NOTE — TELEPHONE ENCOUNTER
Left message for pt's daughter More [phone] requesting a call back at earliest convenience. Need to schedule patient's labs prior to her NOV on 4/3/25.

## 2025-04-01 ENCOUNTER — LAB VISIT (OUTPATIENT)
Dept: LAB | Facility: HOSPITAL | Age: 65
End: 2025-04-01
Attending: INTERNAL MEDICINE
Payer: MEDICARE

## 2025-04-01 DIAGNOSIS — I42.2 HYPERTROPHIC CARDIOMYOPATHY: ICD-10-CM

## 2025-04-01 DIAGNOSIS — E66.01 MORBID OBESITY: ICD-10-CM

## 2025-04-01 DIAGNOSIS — N18.6 TYPE 2 DIABETES MELLITUS WITH CHRONIC KIDNEY DISEASE ON CHRONIC DIALYSIS, WITHOUT LONG-TERM CURRENT USE OF INSULIN: ICD-10-CM

## 2025-04-01 DIAGNOSIS — I10 PRIMARY HYPERTENSION: ICD-10-CM

## 2025-04-01 DIAGNOSIS — E11.22 TYPE 2 DIABETES MELLITUS WITH CHRONIC KIDNEY DISEASE ON CHRONIC DIALYSIS, WITHOUT LONG-TERM CURRENT USE OF INSULIN: ICD-10-CM

## 2025-04-01 DIAGNOSIS — Z99.2 TYPE 2 DIABETES MELLITUS WITH CHRONIC KIDNEY DISEASE ON CHRONIC DIALYSIS, WITHOUT LONG-TERM CURRENT USE OF INSULIN: ICD-10-CM

## 2025-04-01 DIAGNOSIS — E78.5 DYSLIPIDEMIA: ICD-10-CM

## 2025-04-01 LAB
ABSOLUTE EOSINOPHIL (OHS): 0.25 K/UL
ABSOLUTE MONOCYTE (OHS): 0.8 K/UL (ref 0.3–1)
ABSOLUTE NEUTROPHIL COUNT (OHS): 8.78 K/UL (ref 1.8–7.7)
ALBUMIN SERPL BCP-MCNC: 3 G/DL (ref 3.5–5.2)
ALP SERPL-CCNC: 70 UNIT/L (ref 40–150)
ALT SERPL W/O P-5'-P-CCNC: 9 UNIT/L (ref 10–44)
ANION GAP (OHS): 16 MMOL/L (ref 8–16)
AST SERPL-CCNC: 11 UNIT/L (ref 11–45)
BASOPHILS # BLD AUTO: 0.06 K/UL
BASOPHILS NFR BLD AUTO: 0.5 %
BILIRUB SERPL-MCNC: 0.4 MG/DL (ref 0.1–1)
BUN SERPL-MCNC: 56 MG/DL (ref 8–23)
CALCIUM SERPL-MCNC: 9.7 MG/DL (ref 8.7–10.5)
CHLORIDE SERPL-SCNC: 100 MMOL/L (ref 95–110)
CHOLEST SERPL-MCNC: 148 MG/DL (ref 120–199)
CHOLEST/HDLC SERPL: 4.1 {RATIO} (ref 2–5)
CO2 SERPL-SCNC: 27 MMOL/L (ref 23–29)
CREAT SERPL-MCNC: 10.7 MG/DL (ref 0.5–1.4)
EAG (OHS): 154 MG/DL (ref 68–131)
ERYTHROCYTE [DISTWIDTH] IN BLOOD BY AUTOMATED COUNT: 14.8 % (ref 11.5–14.5)
GFR SERPLBLD CREATININE-BSD FMLA CKD-EPI: 4 ML/MIN/1.73/M2
GLUCOSE SERPL-MCNC: 116 MG/DL (ref 70–110)
HBA1C MFR BLD: 7 % (ref 4–5.6)
HCT VFR BLD AUTO: 31.4 % (ref 37–48.5)
HDLC SERPL-MCNC: 36 MG/DL (ref 40–75)
HDLC SERPL: 24.3 % (ref 20–50)
HGB BLD-MCNC: 10.2 GM/DL (ref 12–16)
IMM GRANULOCYTES # BLD AUTO: 0.11 K/UL (ref 0–0.04)
IMM GRANULOCYTES NFR BLD AUTO: 0.9 % (ref 0–0.5)
LDLC SERPL CALC-MCNC: 87.6 MG/DL (ref 63–159)
LYMPHOCYTES # BLD AUTO: 2.06 K/UL (ref 1–4.8)
MCH RBC QN AUTO: 30 PG (ref 27–31)
MCHC RBC AUTO-ENTMCNC: 32.5 G/DL (ref 32–36)
MCV RBC AUTO: 92 FL (ref 82–98)
NONHDLC SERPL-MCNC: 112 MG/DL
NUCLEATED RBC (/100WBC) (OHS): 0 /100 WBC
PLATELET # BLD AUTO: 274 K/UL (ref 150–450)
PMV BLD AUTO: 11.5 FL (ref 9.2–12.9)
POTASSIUM SERPL-SCNC: 4.2 MMOL/L (ref 3.5–5.1)
PROT SERPL-MCNC: 7.2 GM/DL (ref 6–8.4)
RBC # BLD AUTO: 3.4 M/UL (ref 4–5.4)
RELATIVE EOSINOPHIL (OHS): 2.1 %
RELATIVE LYMPHOCYTE (OHS): 17.1 % (ref 18–48)
RELATIVE MONOCYTE (OHS): 6.6 % (ref 4–15)
RELATIVE NEUTROPHIL (OHS): 72.8 % (ref 38–73)
SODIUM SERPL-SCNC: 143 MMOL/L (ref 136–145)
TRIGL SERPL-MCNC: 122 MG/DL (ref 30–150)
TSH SERPL-ACNC: 2.76 UIU/ML (ref 0.4–4)
WBC # BLD AUTO: 12.06 K/UL (ref 3.9–12.7)

## 2025-04-01 PROCEDURE — 36415 COLL VENOUS BLD VENIPUNCTURE: CPT | Mod: PO

## 2025-04-01 PROCEDURE — 83036 HEMOGLOBIN GLYCOSYLATED A1C: CPT

## 2025-04-01 PROCEDURE — 84443 ASSAY THYROID STIM HORMONE: CPT

## 2025-04-01 PROCEDURE — 80061 LIPID PANEL: CPT

## 2025-04-01 PROCEDURE — 84075 ASSAY ALKALINE PHOSPHATASE: CPT

## 2025-04-01 PROCEDURE — 85025 COMPLETE CBC W/AUTO DIFF WBC: CPT

## 2025-04-03 ENCOUNTER — OFFICE VISIT (OUTPATIENT)
Dept: PRIMARY CARE CLINIC | Facility: CLINIC | Age: 65
End: 2025-04-03
Payer: MEDICARE

## 2025-04-03 VITALS
DIASTOLIC BLOOD PRESSURE: 70 MMHG | OXYGEN SATURATION: 96 % | TEMPERATURE: 98 F | WEIGHT: 288.56 LBS | HEART RATE: 90 BPM | HEIGHT: 66 IN | SYSTOLIC BLOOD PRESSURE: 132 MMHG | BODY MASS INDEX: 46.38 KG/M2

## 2025-04-03 DIAGNOSIS — Z99.2 ESRD ON HEMODIALYSIS: ICD-10-CM

## 2025-04-03 DIAGNOSIS — I10 PRIMARY HYPERTENSION: ICD-10-CM

## 2025-04-03 DIAGNOSIS — I42.2 HYPERTROPHIC CARDIOMYOPATHY: Primary | ICD-10-CM

## 2025-04-03 DIAGNOSIS — N18.6 ESRD ON HEMODIALYSIS: ICD-10-CM

## 2025-04-03 DIAGNOSIS — Z12.39 ENCOUNTER FOR SCREENING FOR MALIGNANT NEOPLASM OF BREAST, UNSPECIFIED SCREENING MODALITY: ICD-10-CM

## 2025-04-03 DIAGNOSIS — N64.4 BREAST TENDERNESS: ICD-10-CM

## 2025-04-03 DIAGNOSIS — Z12.31 ENCOUNTER FOR SCREENING MAMMOGRAM FOR MALIGNANT NEOPLASM OF BREAST: ICD-10-CM

## 2025-04-03 DIAGNOSIS — K21.9 GASTROESOPHAGEAL REFLUX DISEASE, UNSPECIFIED WHETHER ESOPHAGITIS PRESENT: ICD-10-CM

## 2025-04-03 DIAGNOSIS — E78.5 DYSLIPIDEMIA: ICD-10-CM

## 2025-04-03 PROCEDURE — 99213 OFFICE O/P EST LOW 20 MIN: CPT | Mod: PBBFAC,PN | Performed by: INTERNAL MEDICINE

## 2025-04-03 PROCEDURE — 99999 PR PBB SHADOW E&M-EST. PATIENT-LVL III: CPT | Mod: PBBFAC,,, | Performed by: INTERNAL MEDICINE

## 2025-04-03 NOTE — PROGRESS NOTES
INTERNAL MEDICINE PROGRESS/URGENT CARE NOTE    CHIEF COMPLAINT     Chief Complaint   Patient presents with    Follow-up     Patient presents for her 3 month follow up appointment.       KIKA     Marian Dumont is a 65 y.o.  female who presents with a PMHx of  EDRD on HD, DM2, HLD, HTN, anemia, GERD, Morbid obesity, who presents today for routine follow up visit.      Her main complaints and concerns today are:   Gradual then acute hearing loss. Has been seeing an ENT at [facility]    At her last visit, she reported that she was doing very well   Wished to be off the ozempic. Says she has injection site tenderness. Also not losing any weight was previously on onglyza which did work but had trouble getting it approved. If sugars increase off the ozempic will retry this one. Today, she reports that re her glucose has been adequatey controlled. Hgb A1c has gone up to 7.0      At her previous visits, we have discussed:   We wanted her to lose a great deal fo wieght to qualify to be on the transplant list, but then came the issues of CHF. Previous weight was 308 and on last visit was 271.   10-pound wieght gain. Says due to lack of activity sits all day reading per patient.we discussed scheduling her exercise  Worsening diabetes control. Unable to get her medications as the pharmacy assistance program has just heplped with her medication.her hgb A1c (done a t HD is now 7.3 up from 6.9. she will now start the ozempic. Concerns as she's on HD.      ESRD on HD: has dialysis on days M/W/F.      DM2: on ongyza. Last hgb a1c was 7.4 and not at goal.   Foot exm was done today  Last eyee exam was scheduled for today after this appointment      January 2019 had monty holes with drain of brain bleed      Perdicardial effusion: pericardial window with abelardo.    Home Medications:  Prior to Admission medications    Medication Sig Start Date End Date Taking? Authorizing Provider   amLODIPine (NORVASC) 5 MG tablet Take 1 tablet (5 mg total)  by mouth once daily. 3/5/24   Mohini Majano MD   aspirin (ECOTRIN) 81 MG EC tablet Take 81 mg by mouth once daily.    Provider, Historical   calcitRIOL (ROCALTROL) 0.25 MCG Cap Take 0.25 mcg by mouth once daily.    Provider, Historical   carvediloL (COREG) 12.5 MG tablet TAKE 1 TABLET(12.5 MG) BY MOUTH TWICE DAILY WITH MEALS 9/5/24   Mohini Majano MD   cinacalcet (SENSIPAR) 30 MG Tab Take 30 mg by mouth Daily. 1/18/23   Provider, Historical   LIDOcaine-prilocaine (EMLA) cream Apply 1 each topically as needed. 1/26/23   Provider, Historical   midodrine (PROAMATINE) 5 MG Tab Take 5 mg by mouth every other day. Monday, Wednesday and Friday  Patient not taking: Reported on 1/2/2025 7/26/24   Provider, Historical   omeprazole (PRILOSEC) 40 MG capsule TAKE 1 CAPSULE(40 MG) BY MOUTH EVERY DAY 3/17/25   Mohini Majano MD   ondansetron (ZOFRAN) 4 MG tablet Take 1 tablet (4 mg total) by mouth every 6 (six) hours as needed for Nausea (PRN Dialysys).  Patient not taking: Reported on 1/2/2025 10/21/24   Mohini Majano MD   rosuvastatin (CRESTOR) 10 MG tablet TAKE 1 TABLET(10 MG) BY MOUTH EVERY DAY 10/9/24   Zachary Donald MD       Review of Systems:  Review of Systems        Advance Care Planning     Date: 04/03/2025    Power of   I initiated the process of voluntary advance care planning today and explained the importance of this process to the patient.  I introduced the concept of advance directives to the patient, as well. Then the patient received detailed information about the importance of designating a Health Care Power of  (HCPOA). She was also instructed to communicate with this person about their wishes for future healthcare, should she become sick and lose decision-making capacity. The patient has previously appointed a HCPOA. After our discussion, the patient has decided to complete a HCPOA and has appointed her daughter, health care agent:  on file  & health care  "agent number:  on file . I encouraged her to communicate with this person about their wishes for future healthcare, should she become sick and lose decision-making capacity.      A total of 10 min was spent on advance care planning, goals of care discussion, emotional support, formulating and communicating prognosis and exploring burden/benefit of various approaches of treatment. This discussion occurred on a fully voluntary basis with the verbal consent of the patient and/or family.             PHYSICAL EXAM     /70 (BP Location: Right forearm, Patient Position: Sitting)   Pulse 90   Temp 98.1 °F (36.7 °C)   Ht 5' 6" (1.676 m)   Wt 130.9 kg (288 lb 9.3 oz)   SpO2 96%   BMI 46.58 kg/m²     GEN - A+OX4, NAD   HEENT - PERRL, EOMI, OP clear  Neck - No thyromegaly or cervical LAD. No thyroid masses felt.  CV - RRR, no m/r   Chest - CTAB, no wheezing or rhonchi  Abd - S/NT/ND/+BS.   Ext - 2+BDP and radial pulses. No C/C/E.  Skin - No rash.    LABS         ASSESSMENT/PLAN     Marian Dumont is a 65 y.o. female with  1. Hypertrophic cardiomyopathy  Asymptomatic  Routine follow up with cardiology    2. Primary hypertension  Overview:  BP well controlled today  Continue with current regimen       3. ESRD on hemodialysis  Stable and doing well  Continue per nephrology    4. Dyslipidemia  Worsening. Wishes to use diet though I've offered to increase dose of crestor. If elevated at next vist, she's agreeable to this   Overview:    Continue current management  Advised wieght loss        5. Gastroesophageal reflux disease, unspecified whether esophagitis present  Overview:  Well controlled.   Discussed dietary change             WORRY SCORE 1    RTC in 6 months, sooner if needed and depending on labs.    Mohini Majano MD  Board Certified Internist/Geriatrician  [facility] ([place]      "

## 2025-07-15 ENCOUNTER — HOSPITAL ENCOUNTER (OUTPATIENT)
Dept: RADIOLOGY | Facility: HOSPITAL | Age: 65
Discharge: HOME | End: 2025-07-15
Attending: INTERNAL MEDICINE
Payer: MEDICARE

## 2025-07-15 DIAGNOSIS — N64.4 BREAST TENDERNESS: ICD-10-CM

## 2025-07-15 PROCEDURE — 77062 BREAST TOMOSYNTHESIS BI: CPT | Mod: 26,,, | Performed by: RADIOLOGY

## 2025-07-15 PROCEDURE — 77066 DX MAMMO INCL CAD BI: CPT | Mod: 26,,, | Performed by: RADIOLOGY

## 2025-07-15 PROCEDURE — 77062 BREAST TOMOSYNTHESIS BI: CPT | Mod: TC,PO

## 2025-07-25 DIAGNOSIS — I10 PRIMARY HYPERTENSION: Primary | ICD-10-CM

## 2025-07-25 RX ORDER — AMLODIPINE BESYLATE 5 MG/1
5 TABLET ORAL DAILY
Qty: 90 TABLET | Refills: 3 | Status: SHIPPED | OUTPATIENT
Start: 2025-07-25

## 2025-08-05 ENCOUNTER — OFFICE VISIT (OUTPATIENT)
Dept: PRIMARY CARE CLINIC | Facility: CLINIC | Age: 65
End: 2025-08-05
Payer: MEDICARE

## 2025-08-05 VITALS
OXYGEN SATURATION: 96 % | HEART RATE: 69 BPM | SYSTOLIC BLOOD PRESSURE: 136 MMHG | BODY MASS INDEX: 46.81 KG/M2 | DIASTOLIC BLOOD PRESSURE: 82 MMHG | WEIGHT: 291.25 LBS | HEIGHT: 66 IN | TEMPERATURE: 98 F

## 2025-08-05 DIAGNOSIS — K21.9 GASTROESOPHAGEAL REFLUX DISEASE, UNSPECIFIED WHETHER ESOPHAGITIS PRESENT: ICD-10-CM

## 2025-08-05 DIAGNOSIS — E11.22 TYPE 2 DIABETES MELLITUS WITH CHRONIC KIDNEY DISEASE ON CHRONIC DIALYSIS, WITHOUT LONG-TERM CURRENT USE OF INSULIN: Primary | ICD-10-CM

## 2025-08-05 DIAGNOSIS — N18.6 TYPE 2 DIABETES MELLITUS WITH CHRONIC KIDNEY DISEASE ON CHRONIC DIALYSIS, WITHOUT LONG-TERM CURRENT USE OF INSULIN: Primary | ICD-10-CM

## 2025-08-05 DIAGNOSIS — E78.5 DYSLIPIDEMIA: ICD-10-CM

## 2025-08-05 DIAGNOSIS — I10 PRIMARY HYPERTENSION: ICD-10-CM

## 2025-08-05 DIAGNOSIS — Q61.2 ADPKD (AUTOSOMAL DOMINANT POLYCYSTIC KIDNEY): ICD-10-CM

## 2025-08-05 DIAGNOSIS — I42.2 HYPERTROPHIC CARDIOMYOPATHY: ICD-10-CM

## 2025-08-05 DIAGNOSIS — Z99.2 TYPE 2 DIABETES MELLITUS WITH CHRONIC KIDNEY DISEASE ON CHRONIC DIALYSIS, WITHOUT LONG-TERM CURRENT USE OF INSULIN: Primary | ICD-10-CM

## 2025-08-05 DIAGNOSIS — Z99.2 ESRD ON HEMODIALYSIS: ICD-10-CM

## 2025-08-05 DIAGNOSIS — E66.01 MORBID OBESITY WITH BODY MASS INDEX (BMI) OF 40.0 TO 49.9: ICD-10-CM

## 2025-08-05 DIAGNOSIS — N18.6 ESRD ON HEMODIALYSIS: ICD-10-CM

## 2025-08-05 PROCEDURE — 99999 PR PBB SHADOW E&M-EST. PATIENT-LVL III: CPT | Mod: PBBFAC,,, | Performed by: INTERNAL MEDICINE

## 2025-08-05 PROCEDURE — 99215 OFFICE O/P EST HI 40 MIN: CPT | Mod: S$PBB,,, | Performed by: INTERNAL MEDICINE

## 2025-08-05 PROCEDURE — 99213 OFFICE O/P EST LOW 20 MIN: CPT | Mod: PBBFAC,PN | Performed by: INTERNAL MEDICINE

## 2025-08-05 RX ORDER — CLOPIDOGREL BISULFATE 75 MG/1
75 TABLET ORAL ONCE
COMMUNITY
Start: 2025-04-04

## 2025-08-05 NOTE — PROGRESS NOTES
INTERNAL MEDICINE PROGRESS/URGENT CARE NOTE    CHIEF COMPLAINT     Chief Complaint   Patient presents with    Follow-up     Patient presents for her 4 month follow up appointment.       KIKA     Marian Dumont is a 65 y.o.  female who presents with a PMHx of  EDRD on HD, DM2, HLD, HTN, anemia, GERD, Morbid obesity, who presents today for routine follow up visit.      Her main complaints and concerns today are:   Very tired during dialysis. Has had to have less fluid taken off yesterday .        At her previous visits, we have discussed:   We wanted her to lose a great deal fo wieght to qualify to be on the transplant list, but then came the issues of CHF. Previous weight was 308 and on last visit was 271.   10-pound wieght gain. Says due to lack of activity sits all day reading per patient.we discussed scheduling her exercise  Worsening diabetes control. Unable to get her medications as the pharmacy assistance program has just heplped with her medication.her hgb A1c (done a t HD is now 7.3 up from 6.9. she will now start the ozempic. Concerns as she's on HD.   Wished to be off the ozempic. Says she has injection site tenderness. Also not losing any weight was previously on onglyza which did work but had trouble getting it approved. If sugars increase off the ozempic will retry this one. Today, she reports that re her glucose has been adequatey controlled. Hgb A1c has gone up to 7.0   Gradual then acute hearing loss. Has been seeing an ENT at [facility]     ESRD on HD: has dialysis on days M/W/F.      DM2: on ongyza. Last hgb a1c was 7.4 and not at goal.   Foot exm was done today  Last eyee exam was scheduled for today after this appointment      January 2019 had monty holes with drain of brain bleed      Perdicardial effusion: pericardial window with abelardo.      Home Medications:  Prior to Admission medications    Medication Sig Start Date End Date Taking? Authorizing Provider   amLODIPine (NORVASC) 5 MG tablet Take 1  tablet (5 mg total) by mouth once daily. 7/25/25   Zachary Donald MD   aspirin (ECOTRIN) 81 MG EC tablet Take 81 mg by mouth once daily.    Provider, Historical   calcitRIOL (ROCALTROL) 0.25 MCG Cap Take 0.25 mcg by mouth once daily.    Provider, Historical   carvediloL (COREG) 12.5 MG tablet TAKE 1 TABLET(12.5 MG) BY MOUTH TWICE DAILY WITH MEALS 9/5/24   Mohini Majano MD   cinacalcet (SENSIPAR) 30 MG Tab Take 30 mg by mouth Daily. 1/18/23   Provider, Historical   LIDOcaine-prilocaine (EMLA) cream Apply 1 each topically as needed. 1/26/23   Provider, Historical   omeprazole (PRILOSEC) 40 MG capsule TAKE 1 CAPSULE(40 MG) BY MOUTH EVERY DAY 3/17/25   Mohini Majano MD   rosuvastatin (CRESTOR) 10 MG tablet TAKE 1 TABLET(10 MG) BY MOUTH EVERY DAY 10/9/24   Zachary Donald MD       Review of Systems:  Review of Systems      Advance Care Planning     Date: 08/05/2025    Power of   I initiated the process of voluntary advance care planning today and explained the importance of this process to the patient.  I introduced the concept of advance directives to the patient, as well. Then the patient received detailed information about the importance of designating a Health Care Power of  (HCPOA). She was also instructed to communicate with this person about their wishes for future healthcare, should she become sick and lose decision-making capacity. The patient has previously appointed a HCPOA. After our discussion, the patient has decided to complete a HCPOA and has appointed her daughter, health care agent: on file & health care agent number: on file. I encouraged her to communicate with this person about their wishes for future healthcare, should she become sick and lose decision-making capacity.      A total of 10 min was spent on advance care planning, goals of care discussion, emotional support, formulating and communicating prognosis and exploring burden/benefit of various  "approaches of treatment. This discussion occurred on a fully voluntary basis with the verbal consent of the patient and/or family.           PHYSICAL EXAM     /82 (BP Location: Right arm, Patient Position: Sitting)   Pulse 69   Temp 98.1 °F (36.7 °C)   Ht 5' 6" (1.676 m)   Wt 132.1 kg (291 lb 3.6 oz)   SpO2 96%   BMI 47.01 kg/m²     GEN - A+OX4, NAD   HEENT - PERRL, EOMI, OP clear  Neck - No thyromegaly or cervical LAD. No thyroid masses felt.  CV - RRR, no m/r   Chest - CTAB, no wheezing or rhonchi  Abd - S/NT/ND/+BS.   Ext - 2+BDP and radial pulses. No C/C/E.  Skin - No rash.    LABS       ASSESSMENT/PLAN     Marian Dumont is a 65 y.o. female with  1. Type 2 diabetes mellitus with chronic kidney disease on chronic dialysis, without long-term current use of insulin  Las hgb A1c is at 7.0. medically managed. Continue current efforts   Overview:  at goal. Discussed lowering carb intake or will add medication in 3 months       2. ESRD on hemodialysis  With some hypotension and weaknes.s per nephrolgist and HD nurses     3. ADPKD (autosomal dominant polycystic kidney)  Overview:  On HD       4. Morbid obesity with body mass index (BMI) of 40.0 to 49.9  Long discussion again re food choices. Discussed inc reasing protein through yogurt. Her daughter is very education on nutrition and gives her tips which I agree with     5. Hypertrophic cardiomyopathy  Reviewed most recent ECHO. Looks good. No change in cardiac symptoms. Will continue to monitor symptoms    6. Primary hypertension  Overview:  BP well controlled today  Continue with current regimen       7. Gastroesophageal reflux disease, unspecified whether esophagitis present  Overview:  Well controlled.   Discussed dietary change      8. Dyslipidemia  Overview:  Well controlled  Continue current management  Advised wieght loss        WORRY SCORE 2    RTC in 3 months, sooner if needed and depending on labs.    Mohini Majano MD  Board Certified " Internist/Geriatrician  [facility] ([place])

## 2025-08-22 ENCOUNTER — TELEPHONE (OUTPATIENT)
Dept: PRIMARY CARE CLINIC | Facility: CLINIC | Age: 65
End: 2025-08-22
Payer: MEDICARE

## (undated) DEVICE — GUIDEWIRE ANGLED .035 150CM

## (undated) DEVICE — INFLATOR ADVANTAGE ENCORE 26

## (undated) DEVICE — CATH MUSTANG 035IN 10.0X20X135

## (undated) DEVICE — KIT MICROINTRODUCE MINI 5X10CM

## (undated) DEVICE — CATH CONQUEST 40 7X9X4X75

## (undated) DEVICE — GUIDEWIRE STF .035X260CM ANG

## (undated) DEVICE — GUIDEWIRE V-18 CONTROL .18

## (undated) DEVICE — CATH GUIDE 5F 65CM STR TAPER

## (undated) DEVICE — SHEATH INTRODUCER 7FR 11CM

## (undated) DEVICE — INTRODUCER VASC 6FX6VM*